# Patient Record
Sex: FEMALE | Employment: PART TIME | URBAN - METROPOLITAN AREA
[De-identification: names, ages, dates, MRNs, and addresses within clinical notes are randomized per-mention and may not be internally consistent; named-entity substitution may affect disease eponyms.]

---

## 2017-03-01 ENCOUNTER — APPOINTMENT (OUTPATIENT)
Dept: PHYSICAL THERAPY | Age: 57
End: 2017-03-01

## 2017-03-07 ENCOUNTER — HOSPITAL ENCOUNTER (OUTPATIENT)
Dept: PHYSICAL THERAPY | Age: 57
Discharge: HOME | End: 2017-03-07
Payer: COMMERCIAL

## 2017-03-07 PROCEDURE — 97162 PT EVAL MOD COMPLEX 30 MIN: CPT

## 2017-03-07 NOTE — PROGRESS NOTES
Shad Stevephill  : 1960 [facility] at [facility] PRASHANT RAO  [address] [facility], [address], [place].  [phone]   [phone]       OUTPATIENT PHYSICAL THERAPY:Initial Assessment 3/8/2017    ICD-10: Treatment Diagnosis: Difficulty in walking, not elsewhere classified (R26.2); Low back pain (M54.5)  Precautions/Allergies:   Pcn [penicillins] and Aspirin   Fall Risk Score: 2 (? 5 = High Risk)  MD Orders: water therapy with [facility]; eval and treat for 8 weeks MEDICAL/REFERRING DIAGNOSIS:  Back Pain   DATE OF ONSET: chronic  REFERRING PHYSICIAN: Luis Carlos [address]: unknown     INITIAL ASSESSMENT:  Ms Frances Romano presents to PT with complaints of pain, decreased strength, and decreased posture. She has complaints of pain from the back of her head throughout her entire spine and all 4 extremities. She has difficulty with mobility and performing her ADL's. She has done aquatics in the past and it has been helpful. She will benefit from skilled physical therapy to help decrease pain and improve mobility. PROBLEM LIST (Impacting functional limitations):  1. Decreased Strength  2. Decreased ADL/Functional Activities   3. Decreased endurance  4. Increased pain INTERVENTIONS PLANNED:  1. aquatic therapy   2. Therapeutic exercise for ROM, strengthening, flexibility  3. Manual therapy  4. Modalities as needed for pain   TREATMENT PLAN:  Effective Dates: 3-7-17 TO 17. Frequency/Duration: 2 times a week for 12 weeks  GOALS: (Goals have been discussed and agreed upon with patient.)  Short-Term Functional Goals: Time Frame: 6 weeks  1. Pt will report compliance with HEP. 2. Pt will be able to demonstrate neutral spine with sitting and standing positions. 3. Pt will perform 5 min walking endurance test.   Discharge Goals: Time Frame: 12 weeks  1. Pt will improve 5 min walking endurance test by 100 ft for improved mobility.   2. Pt will negotiate 1 flight of stairs with step over step to get to her bedroom at home. 3. Pt will demonstrate neutral spine in standing for 5 minutes to perform an ADL at home. Rehabilitation Potential For Stated Goals: Fair  Regarding Patricia Limon's therapy, I certify that the treatment plan above will be carried out by a therapist or under their direction. Thank you for this referral,    Ailyn Ball, PT       Referring Physician Signature: Juan Kidd*              Date                      HISTORY:   History of Present Injury/Illness (Reason for Referral):  Pt was injured in 2007 and continues to have symptoms from accident. Pain 8/10. Pain is constant, pain does go down her legs and arms. Numbness in her right hand. Pt reports her pain \"draws her forward\" and she doesn't like that. She would like to have exercises for improved posture. Past Medical History/Comorbidities:   neck surgery 2000 , 2007  Social History/Living Environment:    Pt lives alone. 2 story house with the bedroom on the 2nd floor. 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 Current Medications:       Current Outpatient Prescriptions:     LORazepam (ATIVAN) 1 mg tablet, Take 1 Tab by mouth nightly as needed for Anxiety. Max Daily Amount: 1 mg., Disp: 15 Tab, Rfl: 0    ZOLPIDEM TARTRATE (AMBIEN PO), Take 10 mg by mouth., Disp: , Rfl:     OXYCODONE HCL/ACETAMINOPHEN (PERCOCET PO), Take 10 mg by mouth., Disp: , Rfl:     citalopram (CELEXA) 20 mg tablet, Take 20 mg by mouth daily. , Disp: , Rfl:     pantoprazole (PROTONIX) 40 mg tablet, Take 40 mg by mouth daily. , Disp: , Rfl:     celecoxib (CELEBREX) 200 mg capsule, Take 200 mg by mouth two (2) times a day.  , Disp: , Rfl:     gabapentin (NEURONTIN) 300 mg capsule, Take 300 mg by mouth three (3) times daily.   , Disp: , Rfl:    Date Last Reviewed:  3-8-17   Number of Personal Factors/Comorbidities that affect the Plan of Care: 1-2: MODERATE COMPLEXITY   EXAMINATION:   Observation/Orthostatic Postural Assessment:          Pt sits with forward trunk and forward head posture. Pt stands with hip in anterior rotation, forward trunk position, forward head, and rounded shoulders. Sitting she leans to the right. Stairs: pt able to go up stairs one step at a time, very slowly. Pt refused to perform 5 min walking endurance test today. Body Structures Involved:  1. Joints  2. Muscles Body Functions Affected:  1. Neuromusculoskeletal Activities and Participation Affected:  1. Mobility  2. Self Care  3. Community, Social and [place]   Number of elements (examined above) that affect the Plan of Care: 3: MODERATE COMPLEXITY   CLINICAL PRESENTATION:   Presentation: Evolving clinical presentation with changing clinical characteristics: MODERATE COMPLEXITY   CLINICAL DECISION MAKING:   Outcome Measure: Tool Used: Modified Oswestry Low Back Pain Questionnaire  Score:  Initial: 35/50  Most Recent: X/50 (Date: -- )   Interpretation of Score: Each section is scored on a 0-5 scale, 5 representing the greatest disability. The scores of each section are added together for a total score of 50. Medical Necessity:   · Patient is expected to demonstrate progress in strength and endurance to improve mobility. Reason for Services/Other Comments:  · Patient continues to require skilled intervention due to decreased mobility and increased pain. Use of outcome tool(s) and clinical judgement create a POC that gives a: Questionable prediction of patient's progress: MODERATE COMPLEXITY            TREATMENT:   (In addition to Assessment/Re-Assessment sessions the following treatments were rendered)  Pre-treatment Symptoms/Complaints:  Pt reports constant pain 8/10  Pain: Initial:     8/10 Post Session:  8/10     Assessment only today, no treatment provided.     Treatment/Session Assessment: · Response to Treatment:  No treatment today. · Compliance with Program/Exercises: Will assess as treatment progresses.   · Recommendations/Intent for next treatment session: aquatic therapy for posture, mobility, strengthening  Total Treatment Duration: 30 minutes  PT Patient Time In/Time Out  Time In: 1500  Time Out: [address]

## 2017-03-08 NOTE — PROGRESS NOTES
Ambulatory/Rehab Services H2 Model Falls Risk Assessment    Risk Factor Pts. ·   Confusion/Disorientation/Impulsivity  []    4 ·   Symptomatic Depression  []   2 ·   Altered Elimination  []   1 ·   Dizziness/Vertigo  []   1 ·   Gender (Male)  []   1 ·   Any administered antiepileptics (anticonvulsants):  []   2 ·   Any administered benzodiazepines:  [x]   1 ·   Visual Impairment (specify):  []   1 ·   Portable Oxygen Use  []   1 ·   Orthostatic ? BP  []   1 ·   History of Recent Falls (within 3 mos.)  []   5     Ability to Rise from Chair (choose one) Pts. ·   Ability to rise in a single movement  []   0 ·   Pushes up, successful in one attempt  [x]   1 ·   Multiple attempts, but successful  []   3 ·   Unable to rise without assistance  []   4   Total: (5 or greater = High Risk) 2     Falls Prevention Plan:   []                Physical Limitations to Exercise (specify):   []                Mobility Assistance Device (type):   []                Exercise/Equipment Adaptation (specify):    ©2010 [facility] [place] Patent #[id number].  Federal Law prohibits the replication, distribution or use without written permission from [facility] [employer]

## 2017-03-15 ENCOUNTER — HOSPITAL ENCOUNTER (OUTPATIENT)
Dept: PHYSICAL THERAPY | Age: 57
Discharge: HOME | End: 2017-03-15
Payer: COMMERCIAL

## 2017-03-15 PROCEDURE — 97113 AQUATIC THERAPY/EXERCISES: CPT

## 2017-03-15 NOTE — PROGRESS NOTES
Macey Sessions  : 1960 Therapy Center at [facility] PRASHANT RAO  [address] [facility], [address] [place] [address],[address], [facility] [address]  [phone]   [phone]       OUTPATIENT PHYSICAL THERAPY:Daily Note 3/15/2017    ICD-10: Treatment Diagnosis: Difficulty in walking, not elsewhere classified (R26.2); Low back pain (M54.5)  Precautions/Allergies:   Pcn [penicillins] and Aspirin   Fall Risk Score: 2 (? 5 = High Risk)  MD Orders: water therapy with [facility]; eval and treat for 8 weeks MEDICAL/REFERRING DIAGNOSIS:  Back Pain   DATE OF ONSET: chronic  REFERRING PHYSICIAN: Luis Carlos [facility]: unknown     INITIAL ASSESSMENT:  Ms Rosalinda Og presents to PT with complaints of pain, decreased strength, and decreased posture. She has complaints of pain from the back of her head throughout her entire spine and all 4 extremities. She has difficulty with mobility and performing her ADL's. She has done aquatics in the past and it has been helpful. She will benefit from skilled physical therapy to help decrease pain and improve mobility. PROBLEM LIST (Impacting functional limitations):  1. Decreased Strength  2. Decreased ADL/Functional Activities   3. Decreased endurance  4. Increased pain INTERVENTIONS PLANNED:  1. aquatic therapy   2. Therapeutic exercise for ROM, strengthening, flexibility  3. Manual therapy  4. Modalities as needed for pain   TREATMENT PLAN:  Effective Dates: 3-7-17 TO 17. Frequency/Duration: 2 times a week for 12 weeks  GOALS: (Goals have been discussed and agreed upon with patient.)  Short-Term Functional Goals: Time Frame: 6 weeks  1. Pt will report compliance with HEP. 2. Pt will be able to demonstrate neutral spine with sitting and standing positions. 3. Pt will perform 5 min walking endurance test.   Discharge Goals: Time Frame: 12 weeks  1. Pt will improve 5 min walking endurance test by 100 ft for improved mobility.   2. Pt will negotiate 1 flight of stairs with step over step to get to her bedroom at home. 3. Pt will demonstrate neutral spine in standing for 5 minutes to perform an ADL at home. Rehabilitation Potential For Stated Goals: Fair  Regarding Patricia Limon's therapy, I certify that the treatment plan above will be carried out by a therapist or under their direction. Thank you for this referral,    Luis Branch, PT       Referring Physician Signature: Renu Hartley*              Date                      HISTORY:   History of Present Injury/Illness (Reason for Referral):  Pt was injured in 2007 and continues to have symptoms from accident. Pain 8/10. Pain is constant, pain does go down her legs and arms. Numbness in her right hand. Pt reports her pain \"draws her forward\" and she doesn't like that. She would like to have exercises for improved posture. Past Medical History/Comorbidities:   neck surgery 2000 , 2007  Social History/Living Environment:    Pt lives alone. 2 story house with the bedroom on the 2nd floor. 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 Current Medications:       Current Outpatient Prescriptions:     LORazepam (ATIVAN) 1 mg tablet, Take 1 Tab by mouth nightly as needed for Anxiety. Max Daily Amount: 1 mg., Disp: 15 Tab, Rfl: 0    ZOLPIDEM TARTRATE (AMBIEN PO), Take 10 mg by mouth., Disp: , Rfl:     OXYCODONE HCL/ACETAMINOPHEN (PERCOCET PO), Take 10 mg by mouth., Disp: , Rfl:     citalopram (CELEXA) 20 mg tablet, Take 20 mg by mouth daily. , Disp: , Rfl:     pantoprazole (PROTONIX) 40 mg tablet, Take 40 mg by mouth daily. , Disp: , Rfl:     celecoxib (CELEBREX) 200 mg capsule, Take 200 mg by mouth two (2) times a day.  , Disp: , Rfl:     gabapentin (NEURONTIN) 300 mg capsule, Take 300 mg by mouth three (3) times daily.   , Disp: , Rfl:    Date Last Reviewed:  3-8-17   Number of Personal Factors/Comorbidities that affect the Plan of Care: 1-2: MODERATE COMPLEXITY   EXAMINATION:   Observation/Orthostatic Postural Assessment:          Pt sits with forward trunk and forward head posture. Pt stands with hip in anterior rotation, forward trunk position, forward head, and rounded shoulders. Sitting she leans to the right. Stairs: pt able to go up stairs one step at a time, very slowly. Pt refused to perform 5 min walking endurance test today. Body Structures Involved:  1. Joints  2. Muscles Body Functions Affected:  1. Neuromusculoskeletal Activities and Participation Affected:  1. Mobility  2. Self Care  3. Community, Social and [place]   Number of elements (examined above) that affect the Plan of Care: 3: MODERATE COMPLEXITY   CLINICAL PRESENTATION:   Presentation: Evolving clinical presentation with changing clinical characteristics: MODERATE COMPLEXITY   CLINICAL DECISION MAKING:   Outcome Measure: Tool Used: Modified Oswestry Low Back Pain Questionnaire  Score:  Initial: 35/50  Most Recent: X/50 (Date: -- )   Interpretation of Score: Each section is scored on a 0-5 scale, 5 representing the greatest disability. The scores of each section are added together for a total score of 50. Medical Necessity:   · Patient is expected to demonstrate progress in strength and endurance to improve mobility. Reason for Services/Other Comments:  · Patient continues to require skilled intervention due to decreased mobility and increased pain. Use of outcome tool(s) and clinical judgement create a POC that gives a: Questionable prediction of patient's progress: MODERATE COMPLEXITY            TREATMENT:   (In addition to Assessment/Re-Assessment sessions the following treatments were rendered)  Pre-treatment Symptoms/Complaints:  Pt reports constant pain 8/10  Pain: Initial:     8/10 Post Session:  8/10     Aquatic Therapy (45 minutes):  Aquatic treatment performed per flow grid for Decreased muscle strength, Decreased endurance, Decreased range of motion, Decreased activity endurance, Decompression, Ease of movement and Low impact and reduced weight bearing activity. Cues provided for posture, gait and ex. Aquatic Exercise Log       Date  3/15 Date   Date   Date   Date     Activity/ Exercise Parameters Parameters Parameters Parameters Parameters   Walking forward 6       Walking backward 6       Walking sideways 6         Marching 6         Goose Step 6         Tip toes 3         Heels 3         Lunges        Side step squats        LE Exercises          Hip Flex/Ext 10         Hip Abd/Add 10         Hip IR/ER          Calf raises 10         Knee Flex 10         Squats          Leg Circles 10/10         Step Ups 10       UE Exercises          butterflies 10         Shoulder rolls 10x         Pull Down          Bicep/Tricep        Rows/Press outs         Chi Positions          Trunk Rotation        Deep H2O/ Noodles 7' with noodle and assist         Stabilization          Arms only          Legs only        Cross   Country 1 min         Scissors 1 min         Jog   Jog 3 min       Lower abdominal   work           Cardio          Jogging        Lap   Swimming          Stretches          Hamstrings          Heelcords          Piriformis          Neck              Treatment/Session Assessment:    · Response to Treatment:  Pt did well with aquatic therapy today. After a few sessions would like to have pt perform the 5 minute walk test on land. Will begin working toward doing it in the water. · Compliance with Program/Exercises: Will assess as treatment progresses.   · Recommendations/Intent for next treatment session: aquatic therapy for posture, mobility, strengthening  Total Treatment Duration: 45 minutes  PT Patient Time In/Time Out  Time In: 0230  Time Out: 0315    Aj Subramanian PT

## 2017-03-17 ENCOUNTER — HOSPITAL ENCOUNTER (OUTPATIENT)
Dept: PHYSICAL THERAPY | Age: 57
Discharge: HOME | End: 2017-03-17
Payer: COMMERCIAL

## 2017-03-22 ENCOUNTER — HOSPITAL ENCOUNTER (OUTPATIENT)
Dept: PHYSICAL THERAPY | Age: 57
Discharge: HOME | End: 2017-03-22
Payer: COMMERCIAL

## 2017-03-22 PROCEDURE — 97113 AQUATIC THERAPY/EXERCISES: CPT

## 2017-03-22 NOTE — PROGRESS NOTES
Ebony Maffucci  : 1960 Therapy Center at [facility] PRASHANT RAO  [address] [facility], [address] [place] [address] 83[address], [address] [facility]  [phone]   [phone]       OUTPATIENT PHYSICAL THERAPY:Daily Note 3/22/2017    ICD-10: Treatment Diagnosis: Difficulty in walking, not elsewhere classified (R26.2); Low back pain (M54.5)  Precautions/Allergies:   Pcn [penicillins] and Aspirin   Fall Risk Score: 2 (? 5 = High Risk)  MD Orders: water therapy with [facility]; eval and treat for 8 weeks MEDICAL/REFERRING DIAGNOSIS:  Back Pain   DATE OF ONSET: chronic  REFERRING PHYSICIAN: Luis Carlos [facility]: unknown     INITIAL ASSESSMENT:  Ms Yoly Payton presents to PT with complaints of pain, decreased strength, and decreased posture. She has complaints of pain from the back of her head throughout her entire spine and all 4 extremities. She has difficulty with mobility and performing her ADL's. She has done aquatics in the past and it has been helpful. She will benefit from skilled physical therapy to help decrease pain and improve mobility. PROBLEM LIST (Impacting functional limitations):  1. Decreased Strength  2. Decreased ADL/Functional Activities   3. Decreased endurance  4. Increased pain INTERVENTIONS PLANNED:  1. aquatic therapy   2. Therapeutic exercise for ROM, strengthening, flexibility  3. Manual therapy  4. Modalities as needed for pain   TREATMENT PLAN:  Effective Dates: 3-7-17 TO 17. Frequency/Duration: 2 times a week for 12 weeks  GOALS: (Goals have been discussed and agreed upon with patient.)  Short-Term Functional Goals: Time Frame: 6 weeks  1. Pt will report compliance with HEP. 2. Pt will be able to demonstrate neutral spine with sitting and standing positions. 3. Pt will perform 5 min walking endurance test.   Discharge Goals: Time Frame: 12 weeks  1. Pt will improve 5 min walking endurance test by 100 ft for improved mobility.   2. Pt will negotiate 1 flight of stairs with step over step to get to her bedroom at home. 3. Pt will demonstrate neutral spine in standing for 5 minutes to perform an ADL at home. Rehabilitation Potential For Stated Goals: Fair  Regarding Patricia Limon's therapy, I certify that the treatment plan above will be carried out by a therapist or under their direction. Thank you for this referral,    Blair Sunshine, PT       Referring Physician Signature: Kailee Songlibia*              Date                      HISTORY:   History of Present Injury/Illness (Reason for Referral):  Pt was injured in 2007 and continues to have symptoms from accident. Pain 8/10. Pain is constant, pain does go down her legs and arms. Numbness in her right hand. Pt reports her pain \"draws her forward\" and she doesn't like that. She would like to have exercises for improved posture. Past Medical History/Comorbidities:   neck surgery 2000 , 2007  Social History/Living Environment:    Pt lives alone. 2 story house with the bedroom on the 2nd floor. 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 Current Medications:       Current Outpatient Prescriptions:     LORazepam (ATIVAN) 1 mg tablet, Take 1 Tab by mouth nightly as needed for Anxiety. Max Daily Amount: 1 mg., Disp: 15 Tab, Rfl: 0    ZOLPIDEM TARTRATE (AMBIEN PO), Take 10 mg by mouth., Disp: , Rfl:     OXYCODONE HCL/ACETAMINOPHEN (PERCOCET PO), Take 10 mg by mouth., Disp: , Rfl:     citalopram (CELEXA) 20 mg tablet, Take 20 mg by mouth daily. , Disp: , Rfl:     pantoprazole (PROTONIX) 40 mg tablet, Take 40 mg by mouth daily. , Disp: , Rfl:     celecoxib (CELEBREX) 200 mg capsule, Take 200 mg by mouth two (2) times a day.  , Disp: , Rfl:     gabapentin (NEURONTIN) 300 mg capsule, Take 300 mg by mouth three (3) times daily.   , Disp: , Rfl:    Date Last Reviewed:  3-22-17   Number of Personal Factors/Comorbidities that affect the Plan of Care: 1-2: MODERATE COMPLEXITY   EXAMINATION:   Observation/Orthostatic Postural Assessment:          Pt sits with forward trunk and forward head posture. Pt stands with hip in anterior rotation, forward trunk position, forward head, and rounded shoulders. Sitting she leans to the right. Stairs: pt able to go up stairs one step at a time, very slowly. Pt refused to perform 5 min walking endurance test today. Body Structures Involved:  1. Joints  2. Muscles Body Functions Affected:  1. Neuromusculoskeletal Activities and Participation Affected:  1. Mobility  2. Self Care  3. Community, Social and [place]   Number of elements (examined above) that affect the Plan of Care: 3: MODERATE COMPLEXITY   CLINICAL PRESENTATION:   Presentation: Evolving clinical presentation with changing clinical characteristics: MODERATE COMPLEXITY   CLINICAL DECISION MAKING:   Outcome Measure: Tool Used: Modified Oswestry Low Back Pain Questionnaire  Score:  Initial: 35/50  Most Recent: X/50 (Date: -- )   Interpretation of Score: Each section is scored on a 0-5 scale, 5 representing the greatest disability. The scores of each section are added together for a total score of 50. Medical Necessity:   · Patient is expected to demonstrate progress in strength and endurance to improve mobility. Reason for Services/Other Comments:  · Patient continues to require skilled intervention due to decreased mobility and increased pain. Use of outcome tool(s) and clinical judgement create a POC that gives a: Questionable prediction of patient's progress: MODERATE COMPLEXITY            TREATMENT:   (In addition to Assessment/Re-Assessment sessions the following treatments were rendered)  Pre-treatment Symptoms/Complaints:  Pt continues to report pain 8/10. She has also had some type of allergic reaction and is complaining of continued itching.   Pain: Initial:     8/10 Post Session:  8/10 Aquatic Therapy (45 minutes): Aquatic treatment performed per flow grid for Decreased muscle strength, Decreased endurance, Decreased range of motion, Decreased activity endurance, Decompression, Ease of movement and Low impact and reduced weight bearing activity. Cues provided for posture, gait and ex. Aquatic Exercise Log       Date  3/15 Date  3/22 Date   Date   Date     Activity/ Exercise Parameters Parameters Parameters Parameters Parameters   Walking forward 6 8      Walking backward 6 6      Walking sideways 6 6        Marching 6 6        Goose Step 6 6        Tip toes 3 3        Heels 3 3        Lunges        Side step squats        LE Exercises          Hip Flex/Ext 10 10        Hip Abd/Add 10 10        Hip IR/ER          Calf raises 10 10        Knee Flex 10 10        Squats          Leg Circles 10/10 10/10        Step Ups 10 10      UE Exercises          butterflies 10 10        Shoulder rolls 10x 10        Pull Down          Bicep/Tricep        Rows/Press outs         Chi Positions          Trunk Rotation        Deep H2O/ Noodles 7' with noodle and assist 7' with noodle and assist        Stabilization          Arms only          Legs only        Cross   Country 1 min 2 min        Scissors 1 min 2 min        Jog   Jog 3 min 2 min      Lower abdominal   work           Cardio          Jogging        Lap   Swimming          Stretches          Hamstrings          Heelcords          Piriformis          Neck              Treatment/Session Assessment:    · Response to Treatment: Pt did well with aquatic therapy today. After a few sessions would like to have pt perform the 5 minute walk test on land. · Compliance with Program/Exercises: Will assess as treatment progresses.   · Recommendations/Intent for next treatment session: aquatic therapy for posture, mobility, strengthening  Total Treatment Duration: 45 minutes  PT Patient Time In/Time Out  Time In: 0230  Time Out: 0315    Alicia Mensah, [place]

## 2017-03-24 ENCOUNTER — HOSPITAL ENCOUNTER (OUTPATIENT)
Dept: PHYSICAL THERAPY | Age: 57
Discharge: HOME | End: 2017-03-24
Payer: COMMERCIAL

## 2017-03-24 PROCEDURE — 97113 AQUATIC THERAPY/EXERCISES: CPT

## 2017-03-24 NOTE — PROGRESS NOTES
Anthony Foreman  : 1960 [facility] at [facility] PRASHANT RAO  1101 [facility], [address] [place] [address],[address], [facility] [address].  [phone]   [phone]       OUTPATIENT PHYSICAL THERAPY:Daily Note 3/24/2017    ICD-10: Treatment Diagnosis: Difficulty in walking, not elsewhere classified (R26.2); Low back pain (M54.5)  Precautions/Allergies:   Pcn [penicillins] and Aspirin   Fall Risk Score: 2 (? 5 = High Risk)  MD Orders: water therapy with [address]; eval and treat for 8 weeks MEDICAL/REFERRING DIAGNOSIS:  Back Pain   DATE OF ONSET: chronic  REFERRING PHYSICIAN: Luis Carlos [facility]: unknown     INITIAL ASSESSMENT:  Ms Ira Strange presents to PT with complaints of pain, decreased strength, and decreased posture. She has complaints of pain from the back of her head throughout her entire spine and all 4 extremities. She has difficulty with mobility and performing her ADL's. She has done aquatics in the past and it has been helpful. She will benefit from skilled physical therapy to help decrease pain and improve mobility. PROBLEM LIST (Impacting functional limitations):  1. Decreased Strength  2. Decreased ADL/Functional Activities   3. Decreased endurance  4. Increased pain INTERVENTIONS PLANNED:  1. aquatic therapy   2. Therapeutic exercise for ROM, strengthening, flexibility  3. Manual therapy  4. Modalities as needed for pain   TREATMENT PLAN:  Effective Dates: 3-7-17 TO 17. Frequency/Duration: 2 times a week for 12 weeks  GOALS: (Goals have been discussed and agreed upon with patient.)  Short-Term Functional Goals: Time Frame: 6 weeks  1. Pt will report compliance with HEP. 2. Pt will be able to demonstrate neutral spine with sitting and standing positions. 3. Pt will perform 5 min walking endurance test.   Discharge Goals: Time Frame: 12 weeks  1. Pt will improve 5 min walking endurance test by 100 ft for improved mobility.   2. Pt will negotiate 1 flight of stairs with step over step to get to her bedroom at home. 3. Pt will demonstrate neutral spine in standing for 5 minutes to perform an ADL at home. Rehabilitation Potential For Stated Goals: Fair  Regarding Patricia Limon's therapy, I certify that the treatment plan above will be carried out by a therapist or under their direction. Thank you for this referral,    Tanvir Mosqueda, PT       Referring Physician Signature: Chely Perez*              Date                      HISTORY:   History of Present Injury/Illness (Reason for Referral):  Pt was injured in 2007 and continues to have symptoms from accident. Pain 8/10. Pain is constant, pain does go down her legs and arms. Numbness in her right hand. Pt reports her pain \"draws her forward\" and she doesn't like that. She would like to have exercises for improved posture. Past Medical History/Comorbidities:   neck surgery 2000 , 2007  Social History/Living Environment:    Pt lives alone. 2 story house with the bedroom on the 2nd floor. 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 Current Medications:       Current Outpatient Prescriptions:     LORazepam (ATIVAN) 1 mg tablet, Take 1 Tab by mouth nightly as needed for Anxiety. Max Daily Amount: 1 mg., Disp: 15 Tab, Rfl: 0    ZOLPIDEM TARTRATE (AMBIEN PO), Take 10 mg by mouth., Disp: , Rfl:     OXYCODONE HCL/ACETAMINOPHEN (PERCOCET PO), Take 10 mg by mouth., Disp: , Rfl:     citalopram (CELEXA) 20 mg tablet, Take 20 mg by mouth daily. , Disp: , Rfl:     pantoprazole (PROTONIX) 40 mg tablet, Take 40 mg by mouth daily. , Disp: , Rfl:     celecoxib (CELEBREX) 200 mg capsule, Take 200 mg by mouth two (2) times a day.  , Disp: , Rfl:     gabapentin (NEURONTIN) 300 mg capsule, Take 300 mg by mouth three (3) times daily.   , Disp: , Rfl:    Date Last Reviewed:  3-8-17   Number of Personal Factors/Comorbidities that affect the Plan of Care: 1-2: MODERATE COMPLEXITY   EXAMINATION:   Observation/Orthostatic Postural Assessment:          Pt sits with forward trunk and forward head posture. Pt stands with hip in anterior rotation, forward trunk position, forward head, and rounded shoulders. Sitting she leans to the right. Stairs: pt able to go up stairs one step at a time, very slowly. Pt refused to perform 5 min walking endurance test today. Body Structures Involved:  1. Joints  2. Muscles Body Functions Affected:  1. Neuromusculoskeletal Activities and Participation Affected:  1. Mobility  2. Self Care  3. Community, Social and [place]   Number of elements (examined above) that affect the Plan of Care: 3: MODERATE COMPLEXITY   CLINICAL PRESENTATION:   Presentation: Evolving clinical presentation with changing clinical characteristics: MODERATE COMPLEXITY   CLINICAL DECISION MAKING:   Outcome Measure: Tool Used: Modified Oswestry Low Back Pain Questionnaire  Score:  Initial: 35/50  Most Recent: X/50 (Date: -- )   Interpretation of Score: Each section is scored on a 0-5 scale, 5 representing the greatest disability. The scores of each section are added together for a total score of 50. Medical Necessity:   · Patient is expected to demonstrate progress in strength and endurance to improve mobility. Reason for Services/Other Comments:  · Patient continues to require skilled intervention due to decreased mobility and increased pain. Use of outcome tool(s) and clinical judgement create a POC that gives a: Questionable prediction of patient's progress: MODERATE COMPLEXITY            TREATMENT:   (In addition to Assessment/Re-Assessment sessions the following treatments were rendered)  Pre-treatment Symptoms/Complaints:  Pt states her pain is constant at 8/10. Pain: Initial:     8/10 Post Session:  8/10     Aquatic Therapy (45 minutes):  Aquatic treatment performed per flow grid for Decreased muscle strength, Decreased endurance, Decreased range of motion, Decreased activity endurance, Decompression, Ease of movement and Low impact and reduced weight bearing activity. Cues provided for posture, gait and ex. Aquatic Exercise Log       Date  3/15 Date  3/24 Date   Date   Date     Activity/ Exercise Parameters Parameters Parameters Parameters Parameters   Walking forward 6 6      Walking backward 6 6      Walking sideways 6 6        Marching 6 6        Goose Step 6 6        Tip toes 3 3        Heels 3 3        Lunges        Side step squats        LE Exercises          Hip Flex/Ext 10 12        Hip Abd/Add 10 12        Hip IR/ER          Calf raises 10 12        Knee Flex 10 12        Squats          Leg Circles 10/10 12/12        Step Ups 10 12      UE Exercises          butterflies 10 12        Shoulder rolls 10x 12        Pull Down          Bicep/Tricep        Rows/Press outs         Chi Positions          Trunk Rotation        Deep H2O/ Noodles 7' with noodle and assist         Stabilization          Arms only          Legs only        Cross   Country 1 min 2 min        Scissors 1 min 1 min        Jog   Jog 3 min       Lower abdominal   work           Cardio          Jogging        Lap   Swimming          Stretches          Hamstrings          Heelcords          Piriformis          Neck              Treatment/Session Assessment:    · Response to Treatment:  Pt did well with exercises in the water. .  · Compliance with Program/Exercises: Will assess as treatment progresses.   · Recommendations/Intent for next treatment session: aquatic therapy for posture, mobility, strengthening  Total Treatment Duration: 45 minutes  PT Patient Time In/Time Out  Time In: 1230  Time Out: 0115    Enedelia Real PT

## 2017-03-29 ENCOUNTER — HOSPITAL ENCOUNTER (OUTPATIENT)
Dept: PHYSICAL THERAPY | Age: 57
End: 2017-03-29
Payer: COMMERCIAL

## 2017-05-03 ENCOUNTER — HOSPITAL ENCOUNTER (OUTPATIENT)
Dept: PHYSICAL THERAPY | Age: 57
Discharge: HOME | End: 2017-05-03
Payer: COMMERCIAL

## 2017-05-03 PROCEDURE — 97113 AQUATIC THERAPY/EXERCISES: CPT

## 2017-05-05 NOTE — PROGRESS NOTES
Rima Perez  : 1960 [facility] PRASHANT RAO  1101 [facility], [address] [place] [address],[address], [facility] [address]  [phone]   [phone]       OUTPATIENT PHYSICAL THERAPY:Daily Note 2017    ICD-10: Treatment Diagnosis: Difficulty in walking, not elsewhere classified (R26.2); Low back pain (M54.5)  Precautions/Allergies:   Pcn [penicillins] and Aspirin   Fall Risk Score: 2 (? 5 = High Risk)  MD Orders: water therapy with [facility]; eval and treat for 8 weeks MEDICAL/REFERRING DIAGNOSIS:  Back Pain   DATE OF ONSET: chronic  REFERRING PHYSICIAN: Luis Carlos [facility] [address]: unknown     INITIAL ASSESSMENT:  Ms Derrick Councilman presents to PT with complaints of pain, decreased strength, and decreased posture. She has complaints of pain from the back of her head throughout her entire spine and all 4 extremities. She has difficulty with mobility and performing her ADL's. She has done aquatics in the past and it has been helpful. She will benefit from skilled physical therapy to help decrease pain and improve mobility. PROBLEM LIST (Impacting functional limitations):  1. Decreased Strength  2. Decreased ADL/Functional Activities   3. Decreased endurance  4. Increased pain INTERVENTIONS PLANNED:  1. aquatic therapy   2. Therapeutic exercise for ROM, strengthening, flexibility  3. Manual therapy  4. Modalities as needed for pain   TREATMENT PLAN:  Effective Dates: 3-7-17 TO 17. Frequency/Duration: 2 times a week for 12 weeks  GOALS: (Goals have been discussed and agreed upon with patient.)  Short-Term Functional Goals: Time Frame: 6 weeks  1. Pt will report compliance with HEP. 2. Pt will be able to demonstrate neutral spine with sitting and standing positions. 3. Pt will perform 5 min walking endurance test.   Discharge Goals: Time Frame: 12 weeks  1. Pt will improve 5 min walking endurance test by 100 ft for improved mobility.   2. Pt will negotiate 1 flight of stairs with step over step to get to her bedroom at home. 3. Pt will demonstrate neutral spine in standing for 5 minutes to perform an ADL at home. Rehabilitation Potential For Stated Goals: Fair  Regarding Patricia Limon's therapy, I certify that the treatment plan above will be carried out by a therapist or under their direction. Thank you for this referral,    Louise Myers, PT       Referring Physician Signature: Jarocho Fraire*              Date                      HISTORY:   History of Present Injury/Illness (Reason for Referral):  Pt was injured in 2007 and continues to have symptoms from accident. Pain 8/10. Pain is constant, pain does go down her legs and arms. Numbness in her right hand. Pt reports her pain \"draws her forward\" and she doesn't like that. She would like to have exercises for improved posture. Past Medical History/Comorbidities:   neck surgery 2000 , 2007  Social History/Living Environment:    Pt lives alone. 2 story house with the bedroom on the 2nd floor. 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 Current Medications:       Current Outpatient Prescriptions:     LORazepam (ATIVAN) 1 mg tablet, Take 1 Tab by mouth nightly as needed for Anxiety. Max Daily Amount: 1 mg., Disp: 15 Tab, Rfl: 0    ZOLPIDEM TARTRATE (AMBIEN PO), Take 10 mg by mouth., Disp: , Rfl:     OXYCODONE HCL/ACETAMINOPHEN (PERCOCET PO), Take 10 mg by mouth., Disp: , Rfl:     citalopram (CELEXA) 20 mg tablet, Take 20 mg by mouth daily. , Disp: , Rfl:     pantoprazole (PROTONIX) 40 mg tablet, Take 40 mg by mouth daily. , Disp: , Rfl:     celecoxib (CELEBREX) 200 mg capsule, Take 200 mg by mouth two (2) times a day.  , Disp: , Rfl:     gabapentin (NEURONTIN) 300 mg capsule, Take 300 mg by mouth three (3) times daily.   , Disp: , Rfl:    Date Last Reviewed:  3-8-17   Number of Personal Factors/Comorbidities that affect the Plan of Care: 1-2: MODERATE COMPLEXITY   EXAMINATION:   Observation/Orthostatic Postural Assessment:          Pt sits with forward trunk and forward head posture. Pt stands with hip in anterior rotation, forward trunk position, forward head, and rounded shoulders. Sitting she leans to the right. Stairs: pt able to go up stairs one step at a time, very slowly. Pt refused to perform 5 min walking endurance test today. Body Structures Involved:  1. Joints  2. Muscles Body Functions Affected:  1. Neuromusculoskeletal Activities and Participation Affected:  1. Mobility  2. Self Care  3. Community, Social and [place]   Number of elements (examined above) that affect the Plan of Care: 3: MODERATE COMPLEXITY   CLINICAL PRESENTATION:   Presentation: Evolving clinical presentation with changing clinical characteristics: MODERATE COMPLEXITY   CLINICAL DECISION MAKING:   Outcome Measure: Tool Used: Modified Oswestry Low Back Pain Questionnaire  Score:  Initial: 35/50  Most Recent: X/50 (Date: -- )   Interpretation of Score: Each section is scored on a 0-5 scale, 5 representing the greatest disability. The scores of each section are added together for a total score of 50. Medical Necessity:   · Patient is expected to demonstrate progress in strength and endurance to improve mobility. Reason for Services/Other Comments:  · Patient continues to require skilled intervention due to decreased mobility and increased pain. Use of outcome tool(s) and clinical judgement create a POC that gives a: Questionable prediction of patient's progress: MODERATE COMPLEXITY            TREATMENT:   (In addition to Assessment/Re-Assessment sessions the following treatments were rendered)  Pre-treatment Symptoms/Complaints:  Pt states her pain is constant at 8/10. Pain: Initial:     8/10 Post Session:  8/10     Aquatic Therapy (45 minutes):  Aquatic treatment performed per flow grid for Decreased muscle strength, Decreased endurance, Decreased range of motion, Decreased activity endurance, Decompression, Ease of movement and Low impact and reduced weight bearing activity. Cues provided for posture, gait and ex. Aquatic Exercise Log       Date  3/15 Date  3/24 Date  5/3 Date   Date     Activity/ Exercise Parameters Parameters Parameters Parameters Parameters   Walking forward 6 6 6     Walking backward 6 6 6     Walking sideways 6 6 6       Marching 6 6 6       Goose Step 6 6 6       Tip toes 3 3 3       Heels 3 3 3       Lunges        Side step squats        LE Exercises          Hip Flex/Ext 10 12 12       Hip Abd/Add 10 12 12       Hip IR/ER          Calf raises 10 12 12       Knee Flex 10 12 12       Squats          Leg Circles 10/10 12/12 12/12       Step Ups 10 12 12     UE Exercises          butterflies 10 12 12       Shoulder rolls 10x 12 12       Pull Down          Bicep/Tricep        Rows/Press outs         Chi Positions          Trunk Rotation        Deep H2O/ Noodles 7' with noodle and assist  7' with noodle and assist       Stabilization          Arms only          Legs only        Cross   Country 1 min 2 min 2 min       Scissors 1 min 1 min 1 min       Jog   Jog 3 min  Jog 3 min     Lower abdominal   work           Cardio          Jogging        Lap   Swimming          Stretches          Hamstrings          Heelcords          Piriformis          Neck              Treatment/Session Assessment:    · Response to Treatment:  Pt did well with exercises in the water. · Compliance with Program/Exercises: Will assess as treatment progresses.   · Recommendations/Intent for next treatment session: aquatic therapy for posture, mobility, strengthening  Total Treatment Duration: 45 minutes       Reece Hardwick PT

## 2017-06-13 ENCOUNTER — HOSPITAL ENCOUNTER (OUTPATIENT)
Dept: PHYSICAL THERAPY | Age: 57
Discharge: HOME | End: 2017-06-13
Payer: COMMERCIAL

## 2017-06-13 PROCEDURE — 97113 AQUATIC THERAPY/EXERCISES: CPT

## 2017-06-20 ENCOUNTER — HOSPITAL ENCOUNTER (OUTPATIENT)
Dept: PHYSICAL THERAPY | Age: 57
Discharge: HOME | End: 2017-06-20
Payer: COMMERCIAL

## 2017-06-27 ENCOUNTER — HOSPITAL ENCOUNTER (OUTPATIENT)
Dept: PHYSICAL THERAPY | Age: 57
Discharge: HOME | End: 2017-06-27
Payer: COMMERCIAL

## 2017-06-27 PROCEDURE — 97113 AQUATIC THERAPY/EXERCISES: CPT

## 2017-06-27 NOTE — PROGRESS NOTES
Marcy Bamberger  : 1960 Therapy Center at [facility] PRASHANT RAO  1101 [facility], [address] [place] [address],[address], [facility] [address]  [phone]   [phone]       OUTPATIENT PHYSICAL THERAPY:Daily Note 2017    ICD-10: Treatment Diagnosis: Difficulty in walking, not elsewhere classified (R26.2); Low back pain (M54.5)  Precautions/Allergies:   Pcn [penicillins] and Aspirin   Fall Risk Score: 2 (? 5 = High Risk)  MD Orders: water therapy with [facility]; eval and treat for 8 weeks MEDICAL/REFERRING DIAGNOSIS:  Back Pain   DATE OF ONSET: chronic  REFERRING PHYSICIAN: Luis Carlos [facility]: unknown     INITIAL ASSESSMENT:  Ms John Dempsey presents to PT with complaints of pain, decreased strength, and decreased posture. She has complaints of pain from the back of her head throughout her entire spine and all 4 extremities. She has difficulty with mobility and performing her ADL's. She has done aquatics in the past and it has been helpful. She will benefit from skilled physical therapy to help decrease pain and improve mobility. PROBLEM LIST (Impacting functional limitations):  1. Decreased Strength  2. Decreased ADL/Functional Activities   3. Decreased endurance  4. Increased pain INTERVENTIONS PLANNED:  1. aquatic therapy   2. Therapeutic exercise for ROM, strengthening, flexibility  3. Manual therapy  4. Modalities as needed for pain   TREATMENT PLAN:  Effective Dates: 3-7-17 TO 17. Frequency/Duration: 2 times a week for 12 weeks  GOALS: (Goals have been discussed and agreed upon with patient.)  Short-Term Functional Goals: Time Frame: 6 weeks  1. Pt will report compliance with HEP. 2. Pt will be able to demonstrate neutral spine with sitting and standing positions. 3. Pt will perform 5 min walking endurance test.   Discharge Goals: Time Frame: 12 weeks  1. Pt will improve 5 min walking endurance test by 100 ft for improved mobility.   2. Pt will negotiate 1 flight of stairs with step over step to get to her bedroom at home. 3. Pt will demonstrate neutral spine in standing for 5 minutes to perform an ADL at home. Rehabilitation Potential For Stated Goals: Fair  Regarding Patricia Limon's therapy, I certify that the treatment plan above will be carried out by a therapist or under their direction. Thank you for this referral,    Ivon Barros PTA       Referring Physician Signature: Haven Yeager*              Date                      HISTORY:   History of Present Injury/Illness (Reason for Referral):  Pt was injured in 2007 and continues to have symptoms from accident. Pain 8/10. Pain is constant, pain does go down her legs and arms. Numbness in her right hand. Pt reports her pain \"draws her forward\" and she doesn't like that. She would like to have exercises for improved posture. Past Medical History/Comorbidities:   neck surgery 2000 , 2007  Social History/Living Environment:    Pt lives alone. 2 story house with the bedroom on the 2nd floor. 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 Current Medications:       Current Outpatient Prescriptions:     LORazepam (ATIVAN) 1 mg tablet, Take 1 Tab by mouth nightly as needed for Anxiety. Max Daily Amount: 1 mg., Disp: 15 Tab, Rfl: 0    ZOLPIDEM TARTRATE (AMBIEN PO), Take 10 mg by mouth., Disp: , Rfl:     OXYCODONE HCL/ACETAMINOPHEN (PERCOCET PO), Take 10 mg by mouth., Disp: , Rfl:     citalopram (CELEXA) 20 mg tablet, Take 20 mg by mouth daily. , Disp: , Rfl:     pantoprazole (PROTONIX) 40 mg tablet, Take 40 mg by mouth daily. , Disp: , Rfl:     celecoxib (CELEBREX) 200 mg capsule, Take 200 mg by mouth two (2) times a day.  , Disp: , Rfl:     gabapentin (NEURONTIN) 300 mg capsule, Take 300 mg by mouth three (3) times daily.   , Disp: , Rfl:    Date Last Reviewed:  3-8-17   Number of Personal Factors/Comorbidities that affect the Plan of Care: 1-2: MODERATE COMPLEXITY   EXAMINATION:   Observation/Orthostatic Postural Assessment:          Pt sits with forward trunk and forward head posture. Pt stands with hip in anterior rotation, forward trunk position, forward head, and rounded shoulders. Sitting she leans to the right. Stairs: pt able to go up stairs one step at a time, very slowly. Pt refused to perform 5 min walking endurance test today. Body Structures Involved:  1. Joints  2. Muscles Body Functions Affected:  1. Neuromusculoskeletal Activities and Participation Affected:  1. Mobility  2. Self Care  3. Community, Social and [place]   Number of elements (examined above) that affect the Plan of Care: 3: MODERATE COMPLEXITY   CLINICAL PRESENTATION:   Presentation: Evolving clinical presentation with changing clinical characteristics: MODERATE COMPLEXITY   CLINICAL DECISION MAKING:   Outcome Measure: Tool Used: Modified Oswestry Low Back Pain Questionnaire  Score:  Initial: 35/50  Most Recent: X/50 (Date: -- )   Interpretation of Score: Each section is scored on a 0-5 scale, 5 representing the greatest disability. The scores of each section are added together for a total score of 50. Medical Necessity:   · Patient is expected to demonstrate progress in strength and endurance to improve mobility. Reason for Services/Other Comments:  · Patient continues to require skilled intervention due to decreased mobility and increased pain. Use of outcome tool(s) and clinical judgement create a POC that gives a: Questionable prediction of patient's progress: MODERATE COMPLEXITY            TREATMENT:   (In addition to Assessment/Re-Assessment sessions the following treatments were rendered)  Pre-treatment Symptoms/Complaints:  Pt states her pain is constant at 8/10. Pain: Initial:     5/10 visually Post Session:  5/10     Aquatic Therapy (45 minutes):  Aquatic treatment performed per flow grid for Decreased muscle strength, Decreased endurance, Decreased range of motion, Decreased activity endurance, Decompression, Ease of movement and Low impact and reduced weight bearing activity. Cues provided for posture, gait and ex. Aquatic Exercise Log       Date  3/15 Date  3/24 Date  5/3 Date  6/27/17 Date     Activity/ Exercise Parameters Parameters Parameters Parameters Parameters   Walking forward 6 6 6 6    Walking backward 6 6 6 6    Walking sideways 6 6 6 6      Marching 6 6 6 6      Goose Step 6 6 6 6      Tip toes 3 3 3 3      Heels 3 3 3 3      Lunges    Long step 6    Side step squats        LE Exercises    3.5'      Hip Flex/Ext 10 12 12 12      Hip Abd/Add 10 12 12 12      Hip IR/ER          Calf raises 10 12 12 12      Knee Flex 10 12 12 12      Squats          Leg Circles 10/10 12/12 12/12 12/12      Step Ups 10 12 12 12    UE Exercises          butterflies 10 12 12       Shoulder rolls 10x 12 12       Pull Down          Bicep/Tricep        Rows/Press outs         Chi Positions          Trunk Rotation        Deep H2O/ Noodles 7' with noodle and assist  7' with noodle and assist Declined      Stabilization          Arms only          Legs only        Cross   Country 1 min 2 min 2 min       Scissors 1 min 1 min 1 min       Jog   Jog 3 min  Jog 3 min     Lower abdominal   work           Cardio          Jogging        Lap   Swimming          Stretches          Hamstrings          Heelcords          Piriformis          Neck          Pt sat in front of jet x 15 minutes after PT for pressure relief on back. Treatment/Session Assessment:    · Response to Treatment:  Pt did well with exercises in the water. · Compliance with Program/Exercises: Will assess as treatment progresses.   · Recommendations/Intent for next treatment session: aquatic therapy for posture, mobility, strengthening  Total Treatment Duration: 45 minutes  PT Patient Time In/Time Out  Time In: 1115  Time Out: 1145    Danielle ARIAS Ryan Purpura, PTA

## 2017-06-29 ENCOUNTER — HOSPITAL ENCOUNTER (OUTPATIENT)
Dept: PHYSICAL THERAPY | Age: 57
Discharge: HOME | End: 2017-06-29
Payer: COMMERCIAL

## 2017-07-11 ENCOUNTER — HOSPITAL ENCOUNTER (OUTPATIENT)
Dept: PHYSICAL THERAPY | Age: 57
Discharge: HOME | End: 2017-07-11
Payer: COMMERCIAL

## 2017-07-11 PROCEDURE — 97113 AQUATIC THERAPY/EXERCISES: CPT

## 2017-07-25 ENCOUNTER — HOSPITAL ENCOUNTER (OUTPATIENT)
Dept: PHYSICAL THERAPY | Age: 57
Discharge: HOME | End: 2017-07-25
Payer: COMMERCIAL

## 2017-07-25 PROCEDURE — 97113 AQUATIC THERAPY/EXERCISES: CPT

## 2017-07-25 NOTE — PROGRESS NOTES
Segundo Hill  : 1960 [facility] PRASHANT RAO  1101 [facility], [address] [place] [address],[address], [facility] [address].  [phone]   [phone]       OUTPATIENT PHYSICAL THERAPY:Daily Note 2017    ICD-10: Treatment Diagnosis: Difficulty in walking, not elsewhere classified (R26.2); Low back pain (M54.5)  Precautions/Allergies:   Pcn [penicillins] and Aspirin   Fall Risk Score: 2 (? 5 = High Risk)  MD Orders: water therapy with [facility] at [facility]; eval and treat for 8 weeks MEDICAL/REFERRING DIAGNOSIS:  Back Pain   DATE OF ONSET: chronic  REFERRING PHYSICIAN: Luis Carlos [facility]: unknown     INITIAL ASSESSMENT:  Ms Shae Alvarez presents to PT with complaints of pain, decreased strength, and decreased posture. She has complaints of pain from the back of her head throughout her entire spine and all 4 extremities. She has difficulty with mobility and performing her ADL's. She has done aquatics in the past and it has been helpful. She will benefit from skilled physical therapy to help decrease pain and improve mobility. PROBLEM LIST (Impacting functional limitations):  1. Decreased Strength  2. Decreased ADL/Functional Activities   3. Decreased endurance  4. Increased pain INTERVENTIONS PLANNED:  1. aquatic therapy   2. Therapeutic exercise for ROM, strengthening, flexibility  3. Manual therapy  4. Modalities as needed for pain   TREATMENT PLAN:  Effective Dates: 3-7-17 TO 17. Frequency/Duration: 2 times a week for 12 weeks  GOALS: (Goals have been discussed and agreed upon with patient.)  Short-Term Functional Goals: Time Frame: 6 weeks  1. Pt will report compliance with HEP. 2. Pt will be able to demonstrate neutral spine with sitting and standing positions. 3. Pt will perform 5 min walking endurance test.   Discharge Goals: Time Frame: 12 weeks  1. Pt will improve 5 min walking endurance test by 100 ft for improved mobility.   2. Pt will negotiate 1 flight of stairs with step over step to get to her bedroom at home. 3. Pt will demonstrate neutral spine in standing for 5 minutes to perform an ADL at home. Rehabilitation Potential For Stated Goals: Fair  Regarding Patricia Limon's therapy, I certify that the treatment plan above will be carried out by a therapist or under their direction. Thank you for this referral,    Tanika Underwood, PTA       Referring Physician Signature: Osman Deshpande*              Date                      HISTORY:   History of Present Injury/Illness (Reason for Referral):  Pt was injured in 2007 and continues to have symptoms from accident. Pain 8/10. Pain is constant, pain does go down her legs and arms. Numbness in her right hand. Pt reports her pain \"draws her forward\" and she doesn't like that. She would like to have exercises for improved posture. Past Medical History/Comorbidities:   neck surgery 2000 , 2007  Social History/Living Environment:    Pt lives alone. 2 story house with the bedroom on the 2nd floor. 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 Current Medications:       Current Outpatient Prescriptions:     LORazepam (ATIVAN) 1 mg tablet, Take 1 Tab by mouth nightly as needed for Anxiety. Max Daily Amount: 1 mg., Disp: 15 Tab, Rfl: 0    ZOLPIDEM TARTRATE (AMBIEN PO), Take 10 mg by mouth., Disp: , Rfl:     OXYCODONE HCL/ACETAMINOPHEN (PERCOCET PO), Take 10 mg by mouth., Disp: , Rfl:     citalopram (CELEXA) 20 mg tablet, Take 20 mg by mouth daily. , Disp: , Rfl:     pantoprazole (PROTONIX) 40 mg tablet, Take 40 mg by mouth daily. , Disp: , Rfl:     celecoxib (CELEBREX) 200 mg capsule, Take 200 mg by mouth two (2) times a day.  , Disp: , Rfl:     gabapentin (NEURONTIN) 300 mg capsule, Take 300 mg by mouth three (3) times daily.   , Disp: , Rfl:    Date Last Reviewed:  3-8-17   Number of Personal Factors/Comorbidities that affect the Plan of Care: 1-2: MODERATE COMPLEXITY   EXAMINATION:   Observation/Orthostatic Postural Assessment:          Pt sits with forward trunk and forward head posture. Pt stands with hip in anterior rotation, forward trunk position, forward head, and rounded shoulders. Sitting she leans to the right. Stairs: pt able to go up stairs one step at a time, very slowly. Pt refused to perform 5 min walking endurance test today. Body Structures Involved:  1. Joints  2. Muscles Body Functions Affected:  1. Neuromusculoskeletal Activities and Participation Affected:  1. Mobility  2. Self Care  3. Community, Social and [place]   Number of elements (examined above) that affect the Plan of Care: 3: MODERATE COMPLEXITY   CLINICAL PRESENTATION:   Presentation: Evolving clinical presentation with changing clinical characteristics: MODERATE COMPLEXITY   CLINICAL DECISION MAKING:   Outcome Measure: Tool Used: Modified Oswestry Low Back Pain Questionnaire  Score:  Initial: 35/50  Most Recent: X/50 (Date: -- )   Interpretation of Score: Each section is scored on a 0-5 scale, 5 representing the greatest disability. The scores of each section are added together for a total score of 50. Medical Necessity:   · Patient is expected to demonstrate progress in strength and endurance to improve mobility. Reason for Services/Other Comments:  · Patient continues to require skilled intervention due to decreased mobility and increased pain. Use of outcome tool(s) and clinical judgement create a POC that gives a: Questionable prediction of patient's progress: MODERATE COMPLEXITY            TREATMENT:   (In addition to Assessment/Re-Assessment sessions the following treatments were rendered)  Pre-treatment Symptoms/Complaints:   Pt states she is feeling better from her rash last week. Pain: Initial:     3/10 visually Post Session:  3/10     Aquatic Therapy (45 minutes):  Aquatic treatment performed per flow grid for Decreased muscle strength, Decreased endurance, Decreased range of motion, Decreased activity endurance, Decompression, Ease of movement and Low impact and reduced weight bearing activity. Cues provided for posture, gait and ex. Aquatic Exercise Log       Date  3/15 Date  3/24 Date  5/3 Date  6/27/17 Date  7/11/17 Date  7/25/16   Activity/ Exercise Parameters Parameters Parameters Parameters Parameters    Walking forward 6 6 6 6 6 6   Walking backward 6 6 6 6 6 6   Walking sideways 6 6 6 6 6 6     Marching 6 6 6 6 6 6     Goose Step 6 6 6 6 6 6     Tip toes 3 3 3 3 3 3     Heels 3 3 3 3 3 3     Lunges    Long step 6 Long step 6 Long step 6   Side step squats         LE Exercises    3.5' 4.5' 4.5'     Hip Flex/Ext 10 12 12 12 15 15     Hip Abd/Add 10 12 12 12 15 15     Hip IR/ER           Calf raises 10 12 12 12 15 15     Knee Flex 10 12 12 12 15 15     Squats           Leg Circles 10/10 12/12 12/12 12/12 15/15 15/15     Step Ups 10 12 12 12 15 15   UE Exercises           butterflies 10 12 12        Shoulder rolls 10x 12 12        Pull Down           Bicep/Tricep         Rows/Press outs          Chi Positions           Trunk Rotation         Deep H2O/ Noodles 7' with noodle and assist  7' with noodle and assist Declined 7' with arm rings 7' with blue rings     Stabilization           Arms only           Legs only         Cross   Country 1 min 2 min 2 min  2 min 2 min     Scissors 1 min 1 min 1 min  2 min 2 min     Jog   Jog 3 min  Jog 3 min  2 min 2 min   Lower abdominal   work       DKTC 2 min     Cardio           Jogging         Lap   Swimming           Stretches           Hamstrings           Heelcords           Piriformis           Neck           Pt sat in front of jet x 10 minutes after PT for pressure relief on back. Treatment/Session Assessment:    · Response to Treatment:  Pt did well with exercises in the water. · Compliance with Program/Exercises:  Will assess as treatment progresses.   · Recommendations/Intent for next treatment session: aquatic therapy for posture, mobility, strengthening  Total Treatment Duration: 45 minutes  PT Patient Time In/Time Out  Time In: 1300  Time Out: 454 [facility] [place]

## 2017-07-27 ENCOUNTER — HOSPITAL ENCOUNTER (OUTPATIENT)
Dept: PHYSICAL THERAPY | Age: 57
Discharge: HOME | End: 2017-07-27
Payer: COMMERCIAL

## 2017-08-10 ENCOUNTER — HOSPITAL ENCOUNTER (OUTPATIENT)
Dept: PHYSICAL THERAPY | Age: 57
Discharge: HOME | End: 2017-08-10

## 2017-08-17 ENCOUNTER — HOSPITAL ENCOUNTER (OUTPATIENT)
Dept: PHYSICAL THERAPY | Age: 57
Discharge: HOME | End: 2017-08-17
Payer: COMMERCIAL

## 2017-08-17 PROCEDURE — 97113 AQUATIC THERAPY/EXERCISES: CPT

## 2017-08-17 NOTE — PROGRESS NOTES
Johanna Arce  : 1960 [facility] at [facility] PRASHANT RAO  1101 [facility], [address] [place] [address] 83,[address], [address] [facility]  [phone]   [phone]       OUTPATIENT PHYSICAL THERAPY:Daily Note 2017    ICD-10: Treatment Diagnosis: Difficulty in walking, not elsewhere classified (R26.2); Low back pain (M54.5)  Precautions/Allergies:   Pcn [penicillins] and Aspirin   Fall Risk Score: 2 (? 5 = High Risk)  MD Orders: water therapy with [facility]; eval and treat for 8 weeks MEDICAL/REFERRING DIAGNOSIS:  back pain   DATE OF ONSET: chronic  REFERRING PHYSICIAN: Luis Carlos [facility]: unknown     INITIAL ASSESSMENT:  Ms Irving Samayoa presents to PT with complaints of pain, decreased strength, and decreased posture. She has complaints of pain from the back of her head throughout her entire spine and all 4 extremities. She has difficulty with mobility and performing her ADL's. She has done aquatics in the past and it has been helpful. She will benefit from skilled physical therapy to help decrease pain and improve mobility. PROBLEM LIST (Impacting functional limitations):  1. Decreased Strength  2. Decreased ADL/Functional Activities   3. Decreased endurance  4. Increased pain INTERVENTIONS PLANNED:  1. aquatic therapy   2. Therapeutic exercise for ROM, strengthening, flexibility  3. Manual therapy  4. Modalities as needed for pain   TREATMENT PLAN:  Effective Dates: 3-7-17 TO 17. Frequency/Duration: 2 times a week for 12 weeks  GOALS: (Goals have been discussed and agreed upon with patient.)  Short-Term Functional Goals: Time Frame: 6 weeks  1. Pt will report compliance with HEP. 2. Pt will be able to demonstrate neutral spine with sitting and standing positions. 3. Pt will perform 5 min walking endurance test.   Discharge Goals: Time Frame: 12 weeks  1. Pt will improve 5 min walking endurance test by 100 ft for improved mobility.   2. Pt will negotiate 1 flight of stairs with step over step to get to her bedroom at home. 3. Pt will demonstrate neutral spine in standing for 5 minutes to perform an ADL at home. Rehabilitation Potential For Stated Goals: Fair  Regarding Patricia Limon's therapy, I certify that the treatment plan above will be carried out by a therapist or under their direction. Thank you for this referral,    Kathryn Omer PTA       Referring Physician Signature: Afsaneh Brought*              Date                      HISTORY:   History of Present Injury/Illness (Reason for Referral):  Pt was injured in 2007 and continues to have symptoms from accident. Pain 8/10. Pain is constant, pain does go down her legs and arms. Numbness in her right hand. Pt reports her pain \"draws her forward\" and she doesn't like that. She would like to have exercises for improved posture. Past Medical History/Comorbidities:   neck surgery 2000 , 2007  Social History/Living Environment:    Pt lives alone. 2 story house with the bedroom on the 2nd floor. 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 Current Medications:       Current Outpatient Prescriptions:     LORazepam (ATIVAN) 1 mg tablet, Take 1 Tab by mouth nightly as needed for Anxiety. Max Daily Amount: 1 mg., Disp: 15 Tab, Rfl: 0    ZOLPIDEM TARTRATE (AMBIEN PO), Take 10 mg by mouth., Disp: , Rfl:     OXYCODONE HCL/ACETAMINOPHEN (PERCOCET PO), Take 10 mg by mouth., Disp: , Rfl:     citalopram (CELEXA) 20 mg tablet, Take 20 mg by mouth daily. , Disp: , Rfl:     pantoprazole (PROTONIX) 40 mg tablet, Take 40 mg by mouth daily. , Disp: , Rfl:     celecoxib (CELEBREX) 200 mg capsule, Take 200 mg by mouth two (2) times a day.  , Disp: , Rfl:     gabapentin (NEURONTIN) 300 mg capsule, Take 300 mg by mouth three (3) times daily.   , Disp: , Rfl:    Date Last Reviewed:  3-8-17   Number of Personal Factors/Comorbidities that affect the Plan of Care: 1-2: MODERATE COMPLEXITY   EXAMINATION:   Observation/Orthostatic Postural Assessment:          Pt sits with forward trunk and forward head posture. Pt stands with hip in anterior rotation, forward trunk position, forward head, and rounded shoulders. Sitting she leans to the right. Stairs: pt able to go up stairs one step at a time, very slowly. Pt refused to perform 5 min walking endurance test today. Body Structures Involved:  1. Joints  2. Muscles Body Functions Affected:  1. Neuromusculoskeletal Activities and Participation Affected:  1. Mobility  2. Self Care  3. Community, Social and [place]   Number of elements (examined above) that affect the Plan of Care: 3: MODERATE COMPLEXITY   CLINICAL PRESENTATION:   Presentation: Evolving clinical presentation with changing clinical characteristics: MODERATE COMPLEXITY   CLINICAL DECISION MAKING:   Outcome Measure: Tool Used: Modified Oswestry Low Back Pain Questionnaire  Score:  Initial: 35/50  Most Recent: X/50 (Date: -- )   Interpretation of Score: Each section is scored on a 0-5 scale, 5 representing the greatest disability. The scores of each section are added together for a total score of 50. Medical Necessity:   · Patient is expected to demonstrate progress in strength and endurance to improve mobility. Reason for Services/Other Comments:  · Patient continues to require skilled intervention due to decreased mobility and increased pain. Use of outcome tool(s) and clinical judgement create a POC that gives a: Questionable prediction of patient's progress: MODERATE COMPLEXITY            TREATMENT:   (In addition to Assessment/Re-Assessment sessions the following treatments were rendered)  Pre-treatment Symptoms/Complaints:   Pt states she is having increased pain in hip and back area. Pain: Initial:     3/10 visually Post Session:  3/10     Aquatic Therapy (45 minutes):  Aquatic treatment performed per flow grid for Decreased muscle strength, Decreased endurance, Decreased range of motion, Decreased activity endurance, Decompression, Ease of movement and Low impact and reduced weight bearing activity. Cues provided for posture, gait and ex. Aquatic Exercise Log       Date  7/25/16 Date  8/17/17   Activity/ Exercise     Walking forward 6 6   Walking backward 6 6   Walking sideways 6 6     Marching 6 6     Goose Step 6 6     Tip toes 3 3     Heels 3 3     Lunges Long step 6 Long step 6   Side step squats     LE Exercises 4.5' 4.5''     Hip Flex/Ext 15 15     Hip Abd/Add 15 15     Hip IR/ER       Calf raises 15 15     Knee Flex 15 15     Squats       Leg Circles 15/15 15/15     Step Ups 15 15   UE Exercises       butterflies       Shoulder rolls       Pull Down       Bicep/Tricep     Rows/Press outs      Chi Positions       Trunk Rotation     Deep H2O/ Noodles 7' with blue rings 7' with blue rings     Stabilization       Arms only       Legs only     Cross   Country 2 min 2 min     Scissors 2 min 2 min     Jog   2 min 2 min   Lower abdominal   work  DKTC 2 min DTKC 2 min     Cardio       Jogging     Lap   Swimming       Stretches       Hamstrings       Heelcords       Piriformis       Neck       Pt sat in front of jet x 5 minutes after PT for pressure relief on back. Treatment/Session Assessment:    · Response to Treatment:  Pt did well with exercises in the water. · Compliance with Program/Exercises: Will assess as treatment progresses.   · Recommendations/Intent for next treatment session: aquatic therapy for posture, mobility, strengthening  Total Treatment Duration: 45 minutes  PT Patient Time In/Time Out  Time In: 1300  Time Out: 454 [facility], [facility]

## 2017-10-04 ENCOUNTER — HOSPITAL ENCOUNTER (OUTPATIENT)
Dept: PHYSICAL THERAPY | Age: 57
Discharge: HOME | End: 2017-10-04
Payer: COMMERCIAL

## 2017-10-04 PROCEDURE — 97164 PT RE-EVAL EST PLAN CARE: CPT

## 2017-10-04 PROCEDURE — 97113 AQUATIC THERAPY/EXERCISES: CPT

## 2017-10-04 NOTE — PROGRESS NOTES
Delmon Moritz  : 1960 [facility] PRASHANT RAO  1101 [facility], [address] [place] [address] 83,[address] 527, [address] [facility]  [phone]   [phone]       OUTPATIENT PHYSICAL THERAPY:Daily Note and Recertification     ICD-10: Treatment Diagnosis: Difficulty in walking, not elsewhere classified (R26.2); Low back pain (M54.5)  Precautions/Allergies:   Pcn [penicillins] and Aspirin   Fall Risk Score: 2 (? 5 = High Risk)  MD Orders: water therapy with [facility]; eval and treat for 8 weeks MEDICAL/REFERRING DIAGNOSIS:  back pain   DATE OF ONSET: chronic  REFERRING PHYSICIAN: Luis Carlos [address] [place] [address]: sometime in Oct uncertain of day. Ms Tobi Zazueta has not been to therapy since Aug 17th. She returns for therapy to finish her last 6 visits that were previously approved. She states she has been taking care of both of her parents who were hospitalized. Her function and objective measures have not changed. She continues to report pain from the back of her head down through her entire spine, hips and legs. The plan is for her to continue in the pool with an aquatic program for her last 6 visits. INITIAL ASSESSMENT:  Ms Tobi Zazueta presents to PT with complaints of pain, decreased strength, and decreased posture. She has complaints of pain from the back of her head throughout her entire spine and all 4 extremities. She has difficulty with mobility and performing her ADL's. She has done aquatics in the past and it has been helpful. She will benefit from skilled physical therapy to help decrease pain and improve mobility. PROBLEM LIST (Impacting functional limitations):  1. Decreased Strength  2. Decreased ADL/Functional Activities   3. Decreased endurance  4. Increased pain INTERVENTIONS PLANNED:  1. aquatic therapy   2. Therapeutic exercise for ROM, strengthening, flexibility  3. Manual therapy  4.  Modalities as needed for pain   TREATMENT PLAN:  Effective Dates: 10-4-17 TO 1-4-18. Frequency/Duration: 2 times a week for 3 weeks  GOALS: (Goals have been discussed and agreed upon with patient.)  Short-Term Functional Goals: Time Frame: 6 weeks  1. Pt will report compliance with HEP. ongoing  2. Pt will be able to demonstrate neutral spine with sitting and standing positions. ongoing  3. Pt will perform 5 min walking endurance test.   ongoing  Discharge Goals: Time Frame: 12 weeks  ongoing  1. Pt will improve 5 min walking endurance test by 100 ft for improved mobility. 2. Pt will negotiate 1 flight of stairs with step over step to get to her bedroom at home. 3. Pt will demonstrate neutral spine in standing for 5 minutes to perform an ADL at home. Rehabilitation Potential For Stated Goals: Fair  Regarding Patricia Limon's therapy, I certify that the treatment plan above will be carried out by a therapist or under their direction. Thank you for this referral,    Aramis Day, PT       Referring Physician Signature: Berny Perez*              Date                      HISTORY:   History of Present Injury/Illness (Reason for Referral):  Pt was injured in 2007 and continues to have symptoms from accident. Pain 8/10. Pain is constant, pain does go down her legs and arms. Numbness in her right hand. Pt reports her pain \"draws her forward\" and she doesn't like that. She would like to have exercises for improved posture. Past Medical History/Comorbidities:   neck surgery 2000 , 2007  Social History/Living Environment:    Pt lives alone. 2 story house with the bedroom on the 2nd floor. 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 Current Medications:       Current Outpatient Prescriptions:     LORazepam (ATIVAN) 1 mg tablet, Take 1 Tab by mouth nightly as needed for Anxiety.  Max Daily Amount: 1 mg., Disp: 15 Tab, Rfl: 0    ZOLPIDEM TARTRATE (AMBIEN PO), Take 10 mg by mouth., Disp: , Rfl:     OXYCODONE HCL/ACETAMINOPHEN (PERCOCET PO), Take 10 mg by mouth., Disp: , Rfl:     citalopram (CELEXA) 20 mg tablet, Take 20 mg by mouth daily. , Disp: , Rfl:     pantoprazole (PROTONIX) 40 mg tablet, Take 40 mg by mouth daily. , Disp: , Rfl:     celecoxib (CELEBREX) 200 mg capsule, Take 200 mg by mouth two (2) times a day.  , Disp: , Rfl:     gabapentin (NEURONTIN) 300 mg capsule, Take 300 mg by mouth three (3) times daily. , Disp: , Rfl:    Date Last Reviewed:  10/4/17   Number of Personal Factors/Comorbidities that affect the Plan of Care: 1-2: MODERATE COMPLEXITY   EXAMINATION:   Observation/Orthostatic Postural Assessment:   Performed on 3/7/17        Pt sits with forward trunk and forward head posture. Pt stands with hip in anterior rotation, forward trunk position, forward head, and rounded shoulders. Sitting she leans to the right. Stairs: pt able to go up stairs one step at a time, very slowly. Pt refused to perform 5 min walking endurance test today. Body Structures Involved:  1. Joints  2. Muscles Body Functions Affected:  1. Neuromusculoskeletal Activities and Participation Affected:  1. Mobility  2. Self Care  3. Community, Social and [place]   Number of elements (examined above) that affect the Plan of Care: 3: MODERATE COMPLEXITY   CLINICAL PRESENTATION:   Presentation: Evolving clinical presentation with changing clinical characteristics: MODERATE COMPLEXITY   CLINICAL DECISION MAKING:   Outcome Measure: Tool Used: Modified Oswestry Low Back Pain Questionnaire  Score:  Initial: 35/50  3/7/17 Most Recent: 32/50 (Date: -10/4/17- )   Interpretation of Score: Each section is scored on a 0-5 scale, 5 representing the greatest disability. The scores of each section are added together for a total score of 50. Medical Necessity:   · Patient is expected to demonstrate progress in strength and endurance to improve mobility.   Reason for Services/Other Comments:  · Patient continues to require skilled intervention due to decreased mobility and increased pain. Use of outcome tool(s) and clinical judgement create a POC that gives a: Questionable prediction of patient's progress: MODERATE COMPLEXITY            TREATMENT:   (In addition to Assessment/Re-Assessment sessions the following treatments were rendered)  Pre-treatment Symptoms/Complaints:   Pt states she is having pain in her hips and legs today as well as her spine. Pain: Initial:     8/10 Post Session:  3/10     Aquatic Therapy (45 minutes): Aquatic treatment performed per flow grid for Decreased muscle strength, Decreased endurance, Decreased range of motion, Decreased activity endurance, Decompression, Ease of movement and Low impact and reduced weight bearing activity. Cues provided for posture, gait and ex. Aquatic Exercise Log       Date  10/4/17   Activity/ Exercise    Walking forward 6   Walking backward 6   Walking sideways 6     Marching 6     Goose Step 6     Tip toes 3     Heels 3     Lunges Long step 6   Side step squats    LE Exercises 4.5''     Hip Flex/Ext 15     Hip Abd/Add 15     Hip IR/ER      Calf raises 15     Knee Flex 15     Squats      Leg Circles 15/15     Step Ups 15   UE Exercises      butterflies      Shoulder rolls      Pull Down      Bicep/Tricep    Rows/Press outs     Chi Positions      Trunk Rotation    Deep H2O/ Noodles      Stabilization      Arms only      Legs only    Leandro Controls      Jog      Lower abdominal   work       Cardio      Jogging    Lap   Swimming      Stretches      Hamstrings      Heelcords      Piriformis      Neck      Pt sat in front of jet x 5 minutes after PT for pressure relief on back. Treatment/Session Assessment:    · Response to Treatment:  Pt did well with aquatic exercises. · Compliance with Program/Exercises: Will assess as treatment progresses.   · Recommendations/Intent for next treatment session: aquatic therapy for posture, mobility, strengthening.   Total Treatment Duration: 45 minutes       Kaitlyn Ta, PT

## 2017-10-12 ENCOUNTER — HOSPITAL ENCOUNTER (OUTPATIENT)
Dept: PHYSICAL THERAPY | Age: 57
Discharge: HOME | End: 2017-10-12
Payer: COMMERCIAL

## 2017-10-12 PROCEDURE — 97113 AQUATIC THERAPY/EXERCISES: CPT

## 2017-10-12 NOTE — PROGRESS NOTES
Kiesha Dobson  : 1960 [facility] PRASHANT RAO  [address] [facility], [address] [place] [address] [address], [facility] [facility]  [phone]   [phone]       OUTPATIENT PHYSICAL THERAPY:Daily Note and Aquatic 10/12/2017    ICD-10: Treatment Diagnosis: Difficulty in walking, not elsewhere classified (R26.2); Low back pain (M54.5)  Precautions/Allergies:   Pcn [penicillins] and Aspirin   Fall Risk Score: 2 (? 5 = High Risk)  MD Orders: water therapy with [address] [place] [address] at [facility]; eval and treat for 8 weeks MEDICAL/REFERRING DIAGNOSIS:  back pain   DATE OF ONSET: chronic  REFERRING PHYSICIAN: Luis Carlos [address] [place] [address]: sometime in Oct uncertain of day. Ms Arnulfo King has not been to therapy since Aug 17th. She returns for therapy to finish her last 6 visits that were previously approved. She states she has been taking care of both of her parents who were hospitalized. Her function and objective measures have not changed. She continues to report pain from the back of her head down through her entire spine, hips and legs. The plan is for her to continue in the pool with an aquatic program for her last 6 visits. INITIAL ASSESSMENT:  Ms Arnulfo King presents to PT with complaints of pain, decreased strength, and decreased posture. She has complaints of pain from the back of her head throughout her entire spine and all 4 extremities. She has difficulty with mobility and performing her ADL's. She has done aquatics in the past and it has been helpful. She will benefit from skilled physical therapy to help decrease pain and improve mobility. PROBLEM LIST (Impacting functional limitations):  1. Decreased Strength  2. Decreased ADL/Functional Activities   3. Decreased endurance  4. Increased pain INTERVENTIONS PLANNED:  1. aquatic therapy   2. Therapeutic exercise for ROM, strengthening, flexibility  3. Manual therapy  4.  Modalities as needed for pain   TREATMENT PLAN:  Effective Dates: 10-4-17 TO 1-4-18. Frequency/Duration: 2 times a week for 3 weeks  GOALS: (Goals have been discussed and agreed upon with patient.)  Short-Term Functional Goals: Time Frame: 6 weeks  1. Pt will report compliance with HEP. ongoing  2. Pt will be able to demonstrate neutral spine with sitting and standing positions. ongoing  3. Pt will perform 5 min walking endurance test.   ongoing  Discharge Goals: Time Frame: 12 weeks  ongoing  1. Pt will improve 5 min walking endurance test by 100 ft for improved mobility. 2. Pt will negotiate 1 flight of stairs with step over step to get to her bedroom at home. 3. Pt will demonstrate neutral spine in standing for 5 minutes to perform an ADL at home. Rehabilitation Potential For Stated Goals: Fair  Regarding Patricia Limon's therapy, I certify that the treatment plan above will be carried out by a therapist or under their direction. Thank you for this referral,    Louie Scruggs PTA       Referring Physician Signature: Gustavo Marcial*              Date                      HISTORY:   History of Present Injury/Illness (Reason for Referral):  Pt was injured in 2007 and continues to have symptoms from accident. Pain 8/10. Pain is constant, pain does go down her legs and arms. Numbness in her right hand. Pt reports her pain \"draws her forward\" and she doesn't like that. She would like to have exercises for improved posture. Past Medical History/Comorbidities:   neck surgery 2000 , 2007  Social History/Living Environment:    Pt lives alone. 2 story house with the bedroom on the 2nd floor. 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 Current Medications:       Current Outpatient Prescriptions:     LORazepam (ATIVAN) 1 mg tablet, Take 1 Tab by mouth nightly as needed for Anxiety.  Max Daily Amount: 1 mg., Disp: 15 Tab, Rfl: 0    ZOLPIDEM TARTRATE (AMBIEN PO), Take 10 mg by mouth., Disp: , Rfl:     OXYCODONE HCL/ACETAMINOPHEN (PERCOCET PO), Take 10 mg by mouth., Disp: , Rfl:     citalopram (CELEXA) 20 mg tablet, Take 20 mg by mouth daily. , Disp: , Rfl:     pantoprazole (PROTONIX) 40 mg tablet, Take 40 mg by mouth daily. , Disp: , Rfl:     celecoxib (CELEBREX) 200 mg capsule, Take 200 mg by mouth two (2) times a day.  , Disp: , Rfl:     gabapentin (NEURONTIN) 300 mg capsule, Take 300 mg by mouth three (3) times daily. , Disp: , Rfl:    Date Last Reviewed:  10/4/17   Number of Personal Factors/Comorbidities that affect the Plan of Care: 1-2: MODERATE COMPLEXITY   EXAMINATION:   Observation/Orthostatic Postural Assessment:   Performed on 3/7/17        Pt sits with forward trunk and forward head posture. Pt stands with hip in anterior rotation, forward trunk position, forward head, and rounded shoulders. Sitting she leans to the right. Stairs: pt able to go up stairs one step at a time, very slowly. Pt refused to perform 5 min walking endurance test today. Body Structures Involved:  1. Joints  2. Muscles Body Functions Affected:  1. Neuromusculoskeletal Activities and Participation Affected:  1. Mobility  2. Self Care  3. Community, Social and [place]   Number of elements (examined above) that affect the Plan of Care: 3: MODERATE COMPLEXITY   CLINICAL PRESENTATION:   Presentation: Evolving clinical presentation with changing clinical characteristics: MODERATE COMPLEXITY   CLINICAL DECISION MAKING:   Outcome Measure: Tool Used: Modified Oswestry Low Back Pain Questionnaire  Score:  Initial: 35/50  3/7/17 Most Recent: 32/50 (Date: -10/4/17- )   Interpretation of Score: Each section is scored on a 0-5 scale, 5 representing the greatest disability. The scores of each section are added together for a total score of 50. Medical Necessity:   · Patient is expected to demonstrate progress in strength and endurance to improve mobility.   Reason for Services/Other Comments:  · Patient continues to require skilled intervention due to decreased mobility and increased pain. Use of outcome tool(s) and clinical judgement create a POC that gives a: Questionable prediction of patient's progress: MODERATE COMPLEXITY            TREATMENT:   (In addition to Assessment/Re-Assessment sessions the following treatments were rendered)  Pre-treatment Symptoms/Complaints:   Pt with minimal complaints today. Pain: Initial:     not rated Post Session:  3/10     Aquatic Therapy (45 minutes): Aquatic treatment performed per flow grid for Decreased muscle strength, Decreased endurance, Decreased range of motion, Decreased activity endurance, Decompression, Ease of movement and Low impact and reduced weight bearing activity. Cues provided for posture, gait and ex. Aquatic Exercise Log       Date  10/4/17 Date  10/10/17 Date  10/12/17   Activity/ Exercise      Walking forward 6 6 6   Walking backward 6 6 6   Walking sideways 6 6 6     Marching 6 6 6     Goose Step 6 6 6     Tip toes 3 3 3     Heels 3 3 3     Lunges Long step 6 Long step 6 Long step 6   Side step squats      LE Exercises 4.5'' 3.5' with ring on foot 3. 5' with ring on foot     Hip Flex/Ext 15 15 15     Hip Abd/Add 15 15 15     Hip IR/ER        Calf raises 15       Knee Flex 15 15 15     Squats        Leg Circles 15/15 15/15 15/15     Step Ups 15 15 15   UE Exercises        butterflies        Shoulder rolls        Pull Down        Bicep/Tricep      Rows/Press outs       Chi Positions        Trunk Rotation      Deep H2O/ Noodles  7' with noodle 7' with noodle      Stabilization        Arms only        Legs only      Cross   Country  2 min 2 min     Scissors  2 min 2 min     Jog    2 min 2 min   Lower abdominal   work   2 min 2 min     Cardio        Jogging      Lap   Swimming        Stretches        Hamstrings        Heelcords        Piriformis        Neck        Pt sat in front of jet x 5 minutes after PT for pressure relief on back. Treatment/Session Assessment:    · Response to Treatment:  Pt did well with aquatic exercises. · Compliance with Program/Exercises: Will assess as treatment progresses. · Recommendations/Intent for next treatment session: aquatic therapy for posture, mobility, strengthening.   Total Treatment Duration: 45 minutes  PT Patient Time In/Time Out  Time In: 1045  Time Out: [address] [place], [facility]

## 2017-10-17 ENCOUNTER — HOSPITAL ENCOUNTER (OUTPATIENT)
Dept: PHYSICAL THERAPY | Age: 57
Discharge: HOME | End: 2017-10-17
Payer: COMMERCIAL

## 2017-10-17 PROCEDURE — 97113 AQUATIC THERAPY/EXERCISES: CPT

## 2017-10-17 NOTE — PROGRESS NOTES
Mitzy Moore  : 1960 [facility] at [facility] PRASHANT RAO  [address] [facility], [address] [place] [address],[address], [facility].  [phone]   [phone]       OUTPATIENT PHYSICAL THERAPY:Daily Note and Aquatic 10/17/2017    ICD-10: Treatment Diagnosis: Difficulty in walking, not elsewhere classified (R26.2); Low back pain (M54.5)  Precautions/Allergies:   Pcn [penicillins] and Aspirin   Fall Risk Score: 2 (? 5 = High Risk)  MD Orders: water therapy with [facility]; eval and treat for 8 weeks MEDICAL/REFERRING DIAGNOSIS:  back pain   DATE OF ONSET: chronic  REFERRING PHYSICIAN: Luis Carlos [address] [place] [address]: sometime in Oct uncertain of day. Ms Elmer Gil has not been to therapy since Aug 17th. She returns for therapy to finish her last 6 visits that were previously approved. She states she has been taking care of both of her parents who were hospitalized. Her function and objective measures have not changed. She continues to report pain from the back of her head down through her entire spine, hips and legs. The plan is for her to continue in the pool with an aquatic program for her last 6 visits. INITIAL ASSESSMENT:  Ms Elmer Gil presents to PT with complaints of pain, decreased strength, and decreased posture. She has complaints of pain from the back of her head throughout her entire spine and all 4 extremities. She has difficulty with mobility and performing her ADL's. She has done aquatics in the past and it has been helpful. She will benefit from skilled physical therapy to help decrease pain and improve mobility. PROBLEM LIST (Impacting functional limitations):  1. Decreased Strength  2. Decreased ADL/Functional Activities   3. Decreased endurance  4. Increased pain INTERVENTIONS PLANNED:  1. aquatic therapy   2. Therapeutic exercise for ROM, strengthening, flexibility  3. Manual therapy  4.  Modalities as needed for pain   TREATMENT PLAN:  Effective Dates: 10-4-17 TO 1-4-18. Frequency/Duration: 2 times a week for 3 weeks  GOALS: (Goals have been discussed and agreed upon with patient.)  Short-Term Functional Goals: Time Frame: 6 weeks  1. Pt will report compliance with HEP. ongoing  2. Pt will be able to demonstrate neutral spine with sitting and standing positions. ongoing  3. Pt will perform 5 min walking endurance test.   ongoing  Discharge Goals: Time Frame: 12 weeks  ongoing  1. Pt will improve 5 min walking endurance test by 100 ft for improved mobility. 2. Pt will negotiate 1 flight of stairs with step over step to get to her bedroom at home. 3. Pt will demonstrate neutral spine in standing for 5 minutes to perform an ADL at home. Rehabilitation Potential For Stated Goals: Fair  Regarding Patricia Limon's therapy, I certify that the treatment plan above will be carried out by a therapist or under their direction. Thank you for this referral,    Kavon Burrell PTA       Referring Physician Signature: Werner Citizen*              Date                      HISTORY:   History of Present Injury/Illness (Reason for Referral):  Pt was injured in 2007 and continues to have symptoms from accident. Pain 8/10. Pain is constant, pain does go down her legs and arms. Numbness in her right hand. Pt reports her pain \"draws her forward\" and she doesn't like that. She would like to have exercises for improved posture. Past Medical History/Comorbidities:   neck surgery 2000 , 2007  Social History/Living Environment:    Pt lives alone. 2 story house with the bedroom on the 2nd floor. 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 Current Medications:       Current Outpatient Prescriptions:     LORazepam (ATIVAN) 1 mg tablet, Take 1 Tab by mouth nightly as needed for Anxiety.  Max Daily Amount: 1 mg., Disp: 15 Tab, Rfl: 0    ZOLPIDEM TARTRATE (AMBIEN PO), Take 10 mg by mouth., Disp: , Rfl:     OXYCODONE HCL/ACETAMINOPHEN (PERCOCET PO), Take 10 mg by mouth., Disp: , Rfl:     citalopram (CELEXA) 20 mg tablet, Take 20 mg by mouth daily. , Disp: , Rfl:     pantoprazole (PROTONIX) 40 mg tablet, Take 40 mg by mouth daily. , Disp: , Rfl:     celecoxib (CELEBREX) 200 mg capsule, Take 200 mg by mouth two (2) times a day.  , Disp: , Rfl:     gabapentin (NEURONTIN) 300 mg capsule, Take 300 mg by mouth three (3) times daily. , Disp: , Rfl:    Date Last Reviewed:  10/4/17   Number of Personal Factors/Comorbidities that affect the Plan of Care: 1-2: MODERATE COMPLEXITY   EXAMINATION:   Observation/Orthostatic Postural Assessment:   Performed on 3/7/17        Pt sits with forward trunk and forward head posture. Pt stands with hip in anterior rotation, forward trunk position, forward head, and rounded shoulders. Sitting she leans to the right. Stairs: pt able to go up stairs one step at a time, very slowly. Pt refused to perform 5 min walking endurance test today. Body Structures Involved:  1. Joints  2. Muscles Body Functions Affected:  1. Neuromusculoskeletal Activities and Participation Affected:  1. Mobility  2. Self Care  3. Community, Social and [place]   Number of elements (examined above) that affect the Plan of Care: 3: MODERATE COMPLEXITY   CLINICAL PRESENTATION:   Presentation: Evolving clinical presentation with changing clinical characteristics: MODERATE COMPLEXITY   CLINICAL DECISION MAKING:   Outcome Measure: Tool Used: Modified Oswestry Low Back Pain Questionnaire  Score:  Initial: 35/50  3/7/17 Most Recent: 32/50 (Date: -10/4/17- )   Interpretation of Score: Each section is scored on a 0-5 scale, 5 representing the greatest disability. The scores of each section are added together for a total score of 50. Medical Necessity:   · Patient is expected to demonstrate progress in strength and endurance to improve mobility.   Reason for Services/Other Comments:  · Patient continues to require skilled intervention due to decreased mobility and increased pain. Use of outcome tool(s) and clinical judgement create a POC that gives a: Questionable prediction of patient's progress: MODERATE COMPLEXITY            TREATMENT:   (In addition to Assessment/Re-Assessment sessions the following treatments were rendered)  Pre-treatment Symptoms/Complaints:   Pt doing well today. Pain: Initial:     not rated Post Session:  3/10     Aquatic Therapy (45 minutes): Aquatic treatment performed per flow grid for Decreased muscle strength, Decreased endurance, Decreased range of motion, Decreased activity endurance, Decompression, Ease of movement and Low impact and reduced weight bearing activity. Cues provided for posture, gait and ex. Aquatic Exercise Log       Date  10/4/17 Date  10/10/17 Date  10/12/17 Date  10/17/17   Activity/ Exercise       Walking forward 6 6 6 6   Walking backward 6 6 6 6   Walking sideways 6 6 6 6     Marching 6 6 6 6     Goose Step 6 6 6 6     Tip toes 3 3 3 3     Heels 3 3 3 3     Lunges Long step 6 Long step 6 Long step 6 Long step 6   Side step squats       LE Exercises 4.5'' 3.5' with ring on foot 3. 5' with ring on foot 3. 5' without restistance     Hip Flex/Ext 15 15 15 15     Hip Abd/Add 15 15 15 15     Hip IR/ER         Calf raises 15        Knee Flex 15 15 15 15     Squats         Leg Circles 15/15 15/15 15/15 15/15     Step Ups 15 15 15 15   UE Exercises         butterflies         Shoulder rolls         Pull Down         Bicep/Tricep       Rows/Press outs        Chi Positions         Trunk Rotation       Deep H2O/ Noodles  7' with noodle 7' with noodle  7' with noodle     Stabilization         Arms only         Legs only       Cross   Country  2 min 2 min 2 min     Scissors  2 min 2 min 2 min     Jog    2 min 2 min 2 min   Lower abdominal   work   2 min 2 min DKTC 2 min     Cardio         Jogging       Lap   Swimming         Stretches Hamstrings         Heelcords         Piriformis         Neck         Pt sat in front of jet x 5 minutes after PT for pressure relief on back. Treatment/Session Assessment:    · Response to Treatment:  Pt did well with aquatic exercises. · Compliance with Program/Exercises: Will assess as treatment progresses. · Recommendations/Intent for next treatment session: aquatic therapy for posture, mobility, strengthening.   Total Treatment Duration: 45 minutes  PT Patient Time In/Time Out  Time In: 1100  Time Out: Ana 232, PTA

## 2017-10-24 ENCOUNTER — HOSPITAL ENCOUNTER (OUTPATIENT)
Dept: PHYSICAL THERAPY | Age: 57
Discharge: HOME | End: 2017-10-24
Payer: COMMERCIAL

## 2017-10-24 PROCEDURE — 97113 AQUATIC THERAPY/EXERCISES: CPT

## 2017-10-24 NOTE — PROGRESS NOTES
Mathew Langley  : 1960 [facility] at [facility] PRASHANT RAO  [address] [facility], [address] [place] [address],[address], [facility] [address].  [phone]   [phone]       OUTPATIENT PHYSICAL THERAPY:Daily Note and Aquatic 10/24/2017    ICD-10: Treatment Diagnosis: Difficulty in walking, not elsewhere classified (R26.2); Low back pain (M54.5)  Precautions/Allergies:   Pcn [penicillins] and Aspirin   Fall Risk Score: 2 (? 5 = High Risk)  MD Orders: water therapy with u[facility]sit at [facility]; eval and treat for 8 weeks MEDICAL/REFERRING DIAGNOSIS:  back pain   DATE OF ONSET: chronic  REFERRING PHYSICIAN: Luis Carlos [address] [place] [address]: sometime in Oct uncertain of day. Ms Lu Martinez has not been to therapy since Aug 17th. She returns for therapy to finish her last 6 visits that were previously approved. She states she has been taking care of both of her parents who were hospitalized. Her function and objective measures have not changed. She continues to report pain from the back of her head down through her entire spine, hips and legs. The plan is for her to continue in the pool with an aquatic program for her last 6 visits. INITIAL ASSESSMENT:  Ms Lu Martinez presents to PT with complaints of pain, decreased strength, and decreased posture. She has complaints of pain from the back of her head throughout her entire spine and all 4 extremities. She has difficulty with mobility and performing her ADL's. She has done aquatics in the past and it has been helpful. She will benefit from skilled physical therapy to help decrease pain and improve mobility. PROBLEM LIST (Impacting functional limitations):  1. Decreased Strength  2. Decreased ADL/Functional Activities   3. Decreased endurance  4. Increased pain INTERVENTIONS PLANNED:  1. aquatic therapy   2. Therapeutic exercise for ROM, strengthening, flexibility  3. Manual therapy  4.  Modalities as needed for pain   TREATMENT PLAN:  Effective Dates: 10-4-17 TO 1-4-18. Frequency/Duration: 2 times a week for 3 weeks  GOALS: (Goals have been discussed and agreed upon with patient.)  Short-Term Functional Goals: Time Frame: 6 weeks  1. Pt will report compliance with HEP. ongoing  2. Pt will be able to demonstrate neutral spine with sitting and standing positions. ongoing  3. Pt will perform 5 min walking endurance test.   ongoing  Discharge Goals: Time Frame: 12 weeks  ongoing  1. Pt will improve 5 min walking endurance test by 100 ft for improved mobility. 2. Pt will negotiate 1 flight of stairs with step over step to get to her bedroom at home. 3. Pt will demonstrate neutral spine in standing for 5 minutes to perform an ADL at home. Rehabilitation Potential For Stated Goals: Fair  Regarding Patricia Limon's therapy, I certify that the treatment plan above will be carried out by a therapist or under their direction. Thank you for this referral,    Kavon Burrell PTA       Referring Physician Signature: Werner Citizen*              Date                      HISTORY:   History of Present Injury/Illness (Reason for Referral):  Pt was injured in 2007 and continues to have symptoms from accident. Pain 8/10. Pain is constant, pain does go down her legs and arms. Numbness in her right hand. Pt reports her pain \"draws her forward\" and she doesn't like that. She would like to have exercises for improved posture. Past Medical History/Comorbidities:   neck surgery 2000 , 2007  Social History/Living Environment:    Pt lives alone. 2 story house with the bedroom on the 2nd floor. 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 Current Medications:       Current Outpatient Prescriptions:     LORazepam (ATIVAN) 1 mg tablet, Take 1 Tab by mouth nightly as needed for Anxiety.  Max Daily Amount: 1 mg., Disp: 15 Tab, Rfl: 0    ZOLPIDEM TARTRATE (AMBIEN PO), Take 10 mg by mouth., Disp: , Rfl:     OXYCODONE HCL/ACETAMINOPHEN (PERCOCET PO), Take 10 mg by mouth., Disp: , Rfl:     citalopram (CELEXA) 20 mg tablet, Take 20 mg by mouth daily. , Disp: , Rfl:     pantoprazole (PROTONIX) 40 mg tablet, Take 40 mg by mouth daily. , Disp: , Rfl:     celecoxib (CELEBREX) 200 mg capsule, Take 200 mg by mouth two (2) times a day.  , Disp: , Rfl:     gabapentin (NEURONTIN) 300 mg capsule, Take 300 mg by mouth three (3) times daily. , Disp: , Rfl:    Date Last Reviewed:  10/4/17   Number of Personal Factors/Comorbidities that affect the Plan of Care: 1-2: MODERATE COMPLEXITY   EXAMINATION:   Observation/Orthostatic Postural Assessment:   Performed on 3/7/17        Pt sits with forward trunk and forward head posture. Pt stands with hip in anterior rotation, forward trunk position, forward head, and rounded shoulders. Sitting she leans to the right. Stairs: pt able to go up stairs one step at a time, very slowly. Pt refused to perform 5 min walking endurance test today. Body Structures Involved:  1. Joints  2. Muscles Body Functions Affected:  1. Neuromusculoskeletal Activities and Participation Affected:  1. Mobility  2. Self Care  3. Community, Social and [place]   Number of elements (examined above) that affect the Plan of Care: 3: MODERATE COMPLEXITY   CLINICAL PRESENTATION:   Presentation: Evolving clinical presentation with changing clinical characteristics: MODERATE COMPLEXITY   CLINICAL DECISION MAKING:   Outcome Measure: Tool Used: Modified Oswestry Low Back Pain Questionnaire  Score:  Initial: 35/50  3/7/17 Most Recent: 32/50 (Date: -10/4/17- )   Interpretation of Score: Each section is scored on a 0-5 scale, 5 representing the greatest disability. The scores of each section are added together for a total score of 50. Medical Necessity:   · Patient is expected to demonstrate progress in strength and endurance to improve mobility.   Reason for Services/Other Comments:  · Patient continues to require skilled intervention due to decreased mobility and increased pain. Use of outcome tool(s) and clinical judgement create a POC that gives a: Questionable prediction of patient's progress: MODERATE COMPLEXITY            TREATMENT:   (In addition to Assessment/Re-Assessment sessions the following treatments were rendered)  Pre-treatment Symptoms/Complaints:   Pt doing well today. Pain: Initial:     not rated Post Session:  3/10     Aquatic Therapy (45 minutes): Aquatic treatment performed per flow grid for Decreased muscle strength, Decreased endurance, Decreased range of motion, Decreased activity endurance, Decompression, Ease of movement and Low impact and reduced weight bearing activity. Cues provided for posture, gait and ex. Aquatic Exercise Log       Date  10/4/17 Date  10/10/17 Date  10/12/17 Date  10/17/17 Date  10/24/17   Activity/ Exercise        Walking forward 6 6 6 6 6   Walking backward 6 6 6 6 6   Walking sideways 6 6 6 6 6     Marching 6 6 6 6 6     Goose Step 6 6 6 6 6     Tip toes 3 3 3 3 3     Heels 3 3 3 3 3     Lunges Long step 6 Long step 6 Long step 6 Long step 6 Long step 6   Side step squats        LE Exercises 4.5'' 3.5' with ring on foot 3. 5' with ring on foot 3. 5' without restistance 3. 5' without resistance     Hip Flex/Ext 15 15 15 15 15     Hip Abd/Add 15 15 15 15 15     Hip IR/ER          Calf raises 15         Knee Flex 15 15 15 15 15     Squats          Leg Circles 15/15 15/15 15/15 15/15 15/15     Step Ups 15 15 15 15 15   UE Exercises          butterflies          Shoulder rolls          Pull Down          Bicep/Tricep        Rows/Press outs         Chi Positions          Trunk Rotation        Deep H2O/ Noodles  7' with noodle 7' with noodle  7' with noodle 7' with blue rings     Stabilization          Arms only          Legs only        Cross   Country  2 min 2 min 2 min 2 min     Scissors  2 min 2 min 2 min 2 min     Jog    2 min 2 min 2 min 2 min   Lower abdominal   work   2 min 2 min DKTC 2 min DKTC 2 min     Cardio          Jogging        Lap   Swimming          Stretches          Hamstrings          Heelcords          Piriformis          Neck          Treatment/Session Assessment:    · Response to Treatment:  Pt did well with aquatic exercises. · Compliance with Program/Exercises: Will assess as treatment progresses. · Recommendations/Intent for next treatment session: aquatic therapy for posture, mobility, strengthening.   Total Treatment Duration: 45 minutes  PT Patient Time In/Time Out  Time In: 1115  Time Out: MALGORZATA Stewart

## 2017-10-26 ENCOUNTER — APPOINTMENT (OUTPATIENT)
Dept: PHYSICAL THERAPY | Age: 57
End: 2017-10-26
Payer: COMMERCIAL

## 2017-10-31 ENCOUNTER — HOSPITAL ENCOUNTER (OUTPATIENT)
Dept: PHYSICAL THERAPY | Age: 57
Discharge: HOME | End: 2017-10-31
Payer: COMMERCIAL

## 2017-11-07 ENCOUNTER — HOSPITAL ENCOUNTER (OUTPATIENT)
Dept: PHYSICAL THERAPY | Age: 57
Discharge: HOME | End: 2017-11-07
Payer: COMMERCIAL

## 2017-11-09 ENCOUNTER — HOSPITAL ENCOUNTER (OUTPATIENT)
Dept: PHYSICAL THERAPY | Age: 57
Discharge: HOME | End: 2017-11-09
Payer: COMMERCIAL

## 2017-11-09 PROCEDURE — 97113 AQUATIC THERAPY/EXERCISES: CPT

## 2017-11-09 NOTE — PROGRESS NOTES
Felisa Carlton  : 1960 Therapy Center at [facility] MAIRA  [address], [address] [place] [address],[address], [place] [address].  [phone]   [phone]       OUTPATIENT PHYSICAL THERAPY:Daily Note and Aquatic 2017    ICD-10: Treatment Diagnosis: Difficulty in walking, not elsewhere classified (R26.2); Low back pain (M54.5)  Precautions/Allergies:   Pcn [penicillins] and Aspirin   Fall Risk Score: 2 (? 5 = High Risk)  MD Orders: water therapy with [facility]; eval and treat for 8 weeks MEDICAL/REFERRING DIAGNOSIS:  back pain   DATE OF ONSET: chronic  REFERRING PHYSICIAN: Luis Carlos [address] [place] [address]: sometime in Oct uncertain of day. INITIAL ASSESSMENT:  Ms Chris Coelho presents to PT with complaints of pain, decreased strength, and decreased posture. She has complaints of pain from the back of her head throughout her entire spine and all 4 extremities. She has difficulty with mobility and performing her ADL's. She has done aquatics in the past and it has been helpful. She will benefit from skilled physical therapy to help decrease pain and improve mobility. PROBLEM LIST (Impacting functional limitations):  1. Decreased Strength  2. Decreased ADL/Functional Activities   3. Decreased endurance  4. Increased pain INTERVENTIONS PLANNED:  1. aquatic therapy   2. Therapeutic exercise for ROM, strengthening, flexibility  3. Manual therapy  4. Modalities as needed for pain   TREATMENT PLAN:  Effective Dates: 10-4-17 TO 18. Frequency/Duration: 2 times a week for 3 weeks  GOALS: (Goals have been discussed and agreed upon with patient.)  Short-Term Functional Goals: Time Frame: 6 weeks  1. Pt will report compliance with HEP. ongoing  2. Pt will be able to demonstrate neutral spine with sitting and standing positions. ongoing  3. Pt will perform 5 min walking endurance test.   ongoing  Discharge Goals: Time Frame: 12 weeks  ongoing  1.  Pt will improve 5 min walking endurance test by 100 ft for improved mobility. 2. Pt will negotiate 1 flight of stairs with step over step to get to her bedroom at home. 3. Pt will demonstrate neutral spine in standing for 5 minutes to perform an ADL at home. Rehabilitation Potential For Stated Goals: Fair  Regarding Patricia Limon's therapy, I certify that the treatment plan above will be carried out by a therapist or under their direction. Thank you for this referral,    Eldon Alfaro PTA       Referring Physician Signature: Matt Rater*              Date                      HISTORY:   History of Present Injury/Illness (Reason for Referral):  Pt was injured in 2007 and continues to have symptoms from accident. Pain 8/10. Pain is constant, pain does go down her legs and arms. Numbness in her right hand. Pt reports her pain \"draws her forward\" and she doesn't like that. She would like to have exercises for improved posture. Past Medical History/Comorbidities:   neck surgery 2000 , 2007  Social History/Living Environment:    Pt lives alone. 2 story house with the bedroom on the 2nd floor. 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 Current Medications:       Current Outpatient Prescriptions:     LORazepam (ATIVAN) 1 mg tablet, Take 1 Tab by mouth nightly as needed for Anxiety. Max Daily Amount: 1 mg., Disp: 15 Tab, Rfl: 0    ZOLPIDEM TARTRATE (AMBIEN PO), Take 10 mg by mouth., Disp: , Rfl:     OXYCODONE HCL/ACETAMINOPHEN (PERCOCET PO), Take 10 mg by mouth., Disp: , Rfl:     citalopram (CELEXA) 20 mg tablet, Take 20 mg by mouth daily. , Disp: , Rfl:     pantoprazole (PROTONIX) 40 mg tablet, Take 40 mg by mouth daily. , Disp: , Rfl:     celecoxib (CELEBREX) 200 mg capsule, Take 200 mg by mouth two (2) times a day.  , Disp: , Rfl:     gabapentin (NEURONTIN) 300 mg capsule, Take 300 mg by mouth three (3) times daily.   , Disp: , Rfl:    Date Last Reviewed:  10/4/17   Number of Personal Factors/Comorbidities that affect the Plan of Care: 1-2: MODERATE COMPLEXITY   EXAMINATION:   Observation/Orthostatic Postural Assessment:   Performed on 3/7/17        Pt sits with forward trunk and forward head posture. Pt stands with hip in anterior rotation, forward trunk position, forward head, and rounded shoulders. Sitting she leans to the right. Stairs: pt able to go up stairs one step at a time, very slowly. Pt refused to perform 5 min walking endurance test today. Body Structures Involved:  1. Joints  2. Muscles Body Functions Affected:  1. Neuromusculoskeletal Activities and Participation Affected:  1. Mobility  2. Self Care  3. Community, Social and [place]   Number of elements (examined above) that affect the Plan of Care: 3: MODERATE COMPLEXITY   CLINICAL PRESENTATION:   Presentation: Evolving clinical presentation with changing clinical characteristics: MODERATE COMPLEXITY   CLINICAL DECISION MAKING:   Outcome Measure: Tool Used: Modified Oswestry Low Back Pain Questionnaire  Score:  Initial: 35/50  3/7/17 Most Recent: 32/50 (Date: -10/4/17- )   Interpretation of Score: Each section is scored on a 0-5 scale, 5 representing the greatest disability. The scores of each section are added together for a total score of 50. Medical Necessity:   · Patient is expected to demonstrate progress in strength and endurance to improve mobility. Reason for Services/Other Comments:  · Patient continues to require skilled intervention due to decreased mobility and increased pain. Use of outcome tool(s) and clinical judgement create a POC that gives a: Questionable prediction of patient's progress: MODERATE COMPLEXITY            TREATMENT:   (In addition to Assessment/Re-Assessment sessions the following treatments were rendered)  Pre-treatment Symptoms/Complaints:   Pt doing well.   Talked about the stressors in her life with aging parents. Pain: Initial:     not rated Post Session:  4/10     Aquatic Therapy (45 minutes): Aquatic treatment performed per flow grid for Decreased muscle strength, Decreased endurance, Decreased range of motion, Decreased activity endurance, Decompression, Ease of movement and Low impact and reduced weight bearing activity. Cues provided for posture, gait and ex. Aquatic Exercise Log       Date  10/4/17 Date  10/10/17 Date  10/12/17 Date  10/17/17 Date  10/24/17 Date  11/9/17   Activity/ Exercise         Walking forward 6 6 6 6 6 6   Walking backward 6 6 6 6 6 6   Walking sideways 6 6 6 6 6 6     Marching 6 6 6 6 6 6     Goose Step 6 6 6 6 6 6     Tip toes 3 3 3 3 3 3     Heels 3 3 3 3 3 3     Lunges Long step 6 Long step 6 Long step 6 Long step 6 Long step 6 Long step 6   Side step squats         LE Exercises 4.5'' 3.5' with ring on foot 3. 5' with ring on foot 3. 5' without restistance 3. 5' without resistance 3.5'      Hip Flex/Ext 15 15 15 15 15 15     Hip Abd/Add 15 15 15 15 15 15     Hip IR/ER           Calf raises 15          Knee Flex 15 15 15 15 15 15     Squats           Leg Circles 15/15 15/15 15/15 15/15 15/15 15/15     Step Ups 15 15 15 15 15 15   UE Exercises           butterflies           Shoulder rolls           Pull Down           Bicep/Tricep         Rows/Press outs          Chi Positions           Trunk Rotation         Deep H2O/ Noodles  7' with noodle 7' with noodle  7' with noodle 7' with blue rings 7' with blue rings     Stabilization           Arms only           Legs only         Cross   Country  2 min 2 min 2 min 2 min 3 min     Scissors  2 min 2 min 2 min 2 min 3 min     Jog    2 min 2 min 2 min 2 min 3 min   Lower abdominal   work   2 min 2 min DKTC 2 min DKTC 2 min DTKC 3 min     Cardio           Jogging         Lap   Swimming           Stretches           Hamstrings           Heelcords           Piriformis           Neck             Pt sat in front of jet x 10 minutes at end of session. Treatment/Session Assessment:    · Response to Treatment:   Pt slowly progressing. Will plan to add weights next visit. · Compliance with Program/Exercises: Will assess as treatment progresses. · Recommendations/Intent for next treatment session: aquatic therapy for posture, mobility, strengthening.   Total Treatment Duration: 45 minutes  PT Patient Time In/Time Out  Time In: 1245  Time Out: [address]

## 2018-02-27 NOTE — PROGRESS NOTES
Angelita Lubin  : 1960 Therapy [facility] at [facility] PRASHANT RAO  [address] [facility], [address] [place] [address],[address], [address] [facility]  [phone]   [phone]       OUTPATIENT PHYSICAL THERAPY:Discontinuation Summary    ICD-10: Treatment Diagnosis: Difficulty in walking, not elsewhere classified (R26.2); Low back pain (M54.5)  Precautions/Allergies:   Pcn [penicillins] and Aspirin   Fall Risk Score: 2 (? 5 = High Risk)  MD Orders: water therapy with [address] at [facility]; eval and treat for 8 weeks MEDICAL/REFERRING DIAGNOSIS:  back pain   DATE OF ONSET: chronic  REFERRING PHYSICIAN: Luis Carlos [address] [place] [address]: sometime in Oct uncertain of day. INITIAL ASSESSMENT:  Ms Marcelino Medrano attended 15 visits of PT and did not return after 17. She will be discharged at this time. PROBLEM LIST (Impacting functional limitations):  1. Decreased Strength  2. Decreased ADL/Functional Activities   3. Decreased endurance  4. Increased pain INTERVENTIONS PLANNED:  1. aquatic therapy   2. Therapeutic exercise for ROM, strengthening, flexibility  3. Manual therapy  4. Modalities as needed for pain   TREATMENT PLAN:  Effective Dates: 10-4-17 TO 18. Frequency/Duration: 2 times a week for 3 weeks  GOALS: (Goals have been discussed and agreed upon with patient.)  Short-Term Functional Goals: Time Frame: 6 weeks  1. Pt will report compliance with HEP. ongoing  2. Pt will be able to demonstrate neutral spine with sitting and standing positions. ongoing  3. Pt will perform 5 min walking endurance test.   ongoing  Discharge Goals: Time Frame: 12 weeks  ongoing  1. Pt will improve 5 min walking endurance test by 100 ft for improved mobility. 2. Pt will negotiate 1 flight of stairs with step over step to get to her bedroom at home. 3. Pt will demonstrate neutral spine in standing for 5 minutes to perform an ADL at home.                 TREATMENT:          Aramis Day, PT

## 2018-09-05 ENCOUNTER — HOSPITAL ENCOUNTER (OUTPATIENT)
Dept: SURGERY | Age: 58
Discharge: HOME | End: 2018-09-05

## 2018-09-05 NOTE — PERIOP NOTES
Pt did not arrive for PAT assessment. Spoke with patient, pt states she notified office that she plans to maybe wait later in the year to have surgery done.

## 2018-12-15 ENCOUNTER — HOSPITAL ENCOUNTER (OUTPATIENT)
Age: 58
Setting detail: OBSERVATION
Discharge: HOME | End: 2018-12-17
Attending: EMERGENCY MEDICINE | Admitting: INTERNAL MEDICINE
Payer: COMMERCIAL

## 2018-12-15 ENCOUNTER — APPOINTMENT (OUTPATIENT)
Dept: MRI IMAGING | Age: 58
End: 2018-12-15
Attending: INTERNAL MEDICINE
Payer: COMMERCIAL

## 2018-12-15 ENCOUNTER — APPOINTMENT (OUTPATIENT)
Dept: CT IMAGING | Age: 58
End: 2018-12-15
Attending: EMERGENCY MEDICINE
Payer: COMMERCIAL

## 2018-12-15 DIAGNOSIS — J32.3 SPHENOID SINUSITIS, UNSPECIFIED CHRONICITY: Primary | ICD-10-CM

## 2018-12-15 DIAGNOSIS — R20.0 FACIAL NUMBNESS: ICD-10-CM

## 2018-12-15 DIAGNOSIS — H02.401 PTOSIS OF RIGHT EYELID: ICD-10-CM

## 2018-12-15 PROBLEM — R29.810 FACIAL DROOP: Status: ACTIVE | Noted: 2018-12-15

## 2018-12-15 PROBLEM — J32.9 SINUSITIS: Status: ACTIVE | Noted: 2018-12-15

## 2018-12-15 PROBLEM — H02.409 PTOSIS: Status: ACTIVE | Noted: 2018-12-15

## 2018-12-15 PROBLEM — G89.29 CHRONIC PAIN: Chronic | Status: ACTIVE | Noted: 2018-12-15

## 2018-12-15 LAB
ALBUMIN SERPL-MCNC: 3.9 G/DL (ref 3.5–5)
ALBUMIN/GLOB SERPL: 1 {RATIO}
ALP SERPL-CCNC: 129 U/L (ref 50–130)
ALT SERPL-CCNC: 42 U/L (ref 12–65)
ANION GAP SERPL CALC-SCNC: 9 MMOL/L
AST SERPL-CCNC: 26 U/L (ref 15–37)
BASOPHILS # BLD: 0.1 K/UL (ref 0–0.2)
BASOPHILS NFR BLD: 1 % (ref 0–2)
BILIRUB DIRECT SERPL-MCNC: 0.1 MG/DL
BILIRUB SERPL-MCNC: 0.3 MG/DL (ref 0.2–1.1)
BUN SERPL-MCNC: 10 MG/DL (ref 6–23)
CALCIUM SERPL-MCNC: 8.9 MG/DL (ref 8.3–10.4)
CHLORIDE SERPL-SCNC: 104 MMOL/L (ref 98–107)
CO2 SERPL-SCNC: 27 MMOL/L (ref 21–32)
CREAT SERPL-MCNC: 0.79 MG/DL (ref 0.6–1)
DIFFERENTIAL METHOD BLD: ABNORMAL
EOSINOPHIL # BLD: 0.2 K/UL (ref 0–0.8)
EOSINOPHIL NFR BLD: 2 % (ref 0.5–7.8)
ERYTHROCYTE [DISTWIDTH] IN BLOOD BY AUTOMATED COUNT: 14.6 % (ref 11.9–14.6)
ERYTHROCYTE [SEDIMENTATION RATE] IN BLOOD: 37 MM/HR (ref 0–30)
GLOBULIN SER CALC-MCNC: 4 G/DL (ref 2.3–3.5)
GLUCOSE SERPL-MCNC: 96 MG/DL (ref 65–100)
HCT VFR BLD AUTO: 37.4 % (ref 35.8–46.3)
HGB BLD-MCNC: 11.6 G/DL (ref 11.7–15.4)
IMM GRANULOCYTES # BLD: 0 K/UL (ref 0–0.5)
IMM GRANULOCYTES NFR BLD AUTO: 0 % (ref 0–5)
LYMPHOCYTES # BLD: 3.5 K/UL (ref 0.5–4.6)
LYMPHOCYTES NFR BLD: 41 % (ref 13–44)
MCH RBC QN AUTO: 24.7 PG (ref 26.1–32.9)
MCHC RBC AUTO-ENTMCNC: 31 G/DL (ref 31.4–35)
MCV RBC AUTO: 79.6 FL (ref 79.6–97.8)
MONOCYTES # BLD: 0.6 K/UL (ref 0.1–1.3)
MONOCYTES NFR BLD: 7 % (ref 4–12)
NEUTS SEG # BLD: 4.1 K/UL (ref 1.7–8.2)
NEUTS SEG NFR BLD: 49 % (ref 43–78)
NRBC # BLD: 0 K/UL (ref 0–0.2)
PLATELET # BLD AUTO: 513 K/UL (ref 150–450)
PMV BLD AUTO: 8.8 FL (ref 9.4–12.3)
POTASSIUM SERPL-SCNC: 4.1 MMOL/L (ref 3.5–5.1)
PROT SERPL-MCNC: 7.9 G/DL
RBC # BLD AUTO: 4.7 M/UL (ref 4.05–5.2)
SODIUM SERPL-SCNC: 140 MMOL/L (ref 136–145)
WBC # BLD AUTO: 8.4 K/UL (ref 4.3–11.1)

## 2018-12-15 PROCEDURE — 74011250636 HC RX REV CODE- 250/636: Performed by: EMERGENCY MEDICINE

## 2018-12-15 PROCEDURE — 96372 THER/PROPH/DIAG INJ SC/IM: CPT

## 2018-12-15 PROCEDURE — 85652 RBC SED RATE AUTOMATED: CPT

## 2018-12-15 PROCEDURE — 93005 ELECTROCARDIOGRAM TRACING: CPT | Performed by: INTERNAL MEDICINE

## 2018-12-15 PROCEDURE — 70450 CT HEAD/BRAIN W/O DYE: CPT

## 2018-12-15 PROCEDURE — 85025 COMPLETE CBC W/AUTO DIFF WBC: CPT

## 2018-12-15 PROCEDURE — 80076 HEPATIC FUNCTION PANEL: CPT

## 2018-12-15 PROCEDURE — 74011250636 HC RX REV CODE- 250/636: Performed by: INTERNAL MEDICINE

## 2018-12-15 PROCEDURE — 74011000250 HC RX REV CODE- 250: Performed by: EMERGENCY MEDICINE

## 2018-12-15 PROCEDURE — 74011250637 HC RX REV CODE- 250/637: Performed by: INTERNAL MEDICINE

## 2018-12-15 PROCEDURE — 99218 HC RM OBSERVATION: CPT

## 2018-12-15 PROCEDURE — 96365 THER/PROPH/DIAG IV INF INIT: CPT | Performed by: EMERGENCY MEDICINE

## 2018-12-15 PROCEDURE — 70553 MRI BRAIN STEM W/O & W/DYE: CPT

## 2018-12-15 PROCEDURE — 96376 TX/PRO/DX INJ SAME DRUG ADON: CPT

## 2018-12-15 PROCEDURE — A9575 INJ GADOTERATE MEGLUMI 0.1ML: HCPCS | Performed by: INTERNAL MEDICINE

## 2018-12-15 PROCEDURE — 99283 EMERGENCY DEPT VISIT LOW MDM: CPT | Performed by: EMERGENCY MEDICINE

## 2018-12-15 PROCEDURE — 80048 BASIC METABOLIC PNL TOTAL CA: CPT

## 2018-12-15 PROCEDURE — 96375 TX/PRO/DX INJ NEW DRUG ADDON: CPT | Performed by: EMERGENCY MEDICINE

## 2018-12-15 RX ORDER — CLINDAMYCIN PHOSPHATE 600 MG/50ML
600 INJECTION INTRAVENOUS
Status: COMPLETED | OUTPATIENT
Start: 2018-12-15 | End: 2018-12-15

## 2018-12-15 RX ORDER — NALOXONE HYDROCHLORIDE 0.4 MG/ML
0.4 INJECTION, SOLUTION INTRAMUSCULAR; INTRAVENOUS; SUBCUTANEOUS AS NEEDED
Status: DISCONTINUED | OUTPATIENT
Start: 2018-12-15 | End: 2018-12-17 | Stop reason: HOSPADM

## 2018-12-15 RX ORDER — CELECOXIB 200 MG/1
200 CAPSULE ORAL 2 TIMES DAILY
Status: DISCONTINUED | OUTPATIENT
Start: 2018-12-15 | End: 2018-12-17 | Stop reason: HOSPADM

## 2018-12-15 RX ORDER — MORPHINE SULFATE 2 MG/ML
2 INJECTION, SOLUTION INTRAMUSCULAR; INTRAVENOUS
Status: DISCONTINUED | OUTPATIENT
Start: 2018-12-15 | End: 2018-12-17 | Stop reason: HOSPADM

## 2018-12-15 RX ORDER — CLINDAMYCIN PHOSPHATE 600 MG/50ML
600 INJECTION INTRAVENOUS
Status: DISCONTINUED | OUTPATIENT
Start: 2018-12-15 | End: 2018-12-15 | Stop reason: SDUPTHER

## 2018-12-15 RX ORDER — SODIUM CHLORIDE 0.9 % (FLUSH) 0.9 %
5-10 SYRINGE (ML) INJECTION AS NEEDED
Status: DISCONTINUED | OUTPATIENT
Start: 2018-12-15 | End: 2018-12-17 | Stop reason: HOSPADM

## 2018-12-15 RX ORDER — CITALOPRAM 20 MG/1
20 TABLET, FILM COATED ORAL DAILY
Status: DISCONTINUED | OUTPATIENT
Start: 2018-12-16 | End: 2018-12-17 | Stop reason: HOSPADM

## 2018-12-15 RX ORDER — SODIUM CHLORIDE 0.9 % (FLUSH) 0.9 %
5-10 SYRINGE (ML) INJECTION EVERY 8 HOURS
Status: DISCONTINUED | OUTPATIENT
Start: 2018-12-15 | End: 2018-12-17 | Stop reason: HOSPADM

## 2018-12-15 RX ORDER — ONDANSETRON 2 MG/ML
4 INJECTION INTRAMUSCULAR; INTRAVENOUS
Status: COMPLETED | OUTPATIENT
Start: 2018-12-15 | End: 2018-12-15

## 2018-12-15 RX ORDER — TETRACAINE HYDROCHLORIDE 5 MG/ML
1 SOLUTION OPHTHALMIC
Status: COMPLETED | OUTPATIENT
Start: 2018-12-15 | End: 2018-12-15

## 2018-12-15 RX ORDER — SODIUM CHLORIDE 0.9 % (FLUSH) 0.9 %
10 SYRINGE (ML) INJECTION
Status: COMPLETED | OUTPATIENT
Start: 2018-12-15 | End: 2018-12-15

## 2018-12-15 RX ORDER — ONDANSETRON 2 MG/ML
4 INJECTION INTRAMUSCULAR; INTRAVENOUS
Status: DISCONTINUED | OUTPATIENT
Start: 2018-12-15 | End: 2018-12-17 | Stop reason: HOSPADM

## 2018-12-15 RX ORDER — LORAZEPAM 1 MG/1
1 TABLET ORAL ONCE
Status: COMPLETED | OUTPATIENT
Start: 2018-12-15 | End: 2018-12-15

## 2018-12-15 RX ORDER — MORPHINE SULFATE 4 MG/ML
4 INJECTION INTRAVENOUS
Status: COMPLETED | OUTPATIENT
Start: 2018-12-15 | End: 2018-12-15

## 2018-12-15 RX ORDER — DOXYCYCLINE 100 MG/1
100 CAPSULE ORAL EVERY 12 HOURS
Status: DISCONTINUED | OUTPATIENT
Start: 2018-12-15 | End: 2018-12-17 | Stop reason: HOSPADM

## 2018-12-15 RX ORDER — GADOTERATE MEGLUMINE 376.9 MG/ML
19 INJECTION INTRAVENOUS
Status: COMPLETED | OUTPATIENT
Start: 2018-12-15 | End: 2018-12-15

## 2018-12-15 RX ORDER — HEPARIN SODIUM 5000 [USP'U]/ML
5000 INJECTION, SOLUTION INTRAVENOUS; SUBCUTANEOUS EVERY 8 HOURS
Status: DISCONTINUED | OUTPATIENT
Start: 2018-12-15 | End: 2018-12-17 | Stop reason: HOSPADM

## 2018-12-15 RX ORDER — ACETAMINOPHEN 325 MG/1
650 TABLET ORAL
Status: DISCONTINUED | OUTPATIENT
Start: 2018-12-15 | End: 2018-12-17 | Stop reason: HOSPADM

## 2018-12-15 RX ORDER — SODIUM CHLORIDE 0.9 % (FLUSH) 0.9 %
5-10 SYRINGE (ML) INJECTION EVERY 8 HOURS
Status: DISCONTINUED | OUTPATIENT
Start: 2018-12-15 | End: 2018-12-16

## 2018-12-15 RX ORDER — GABAPENTIN 300 MG/1
300 CAPSULE ORAL 3 TIMES DAILY
Status: DISCONTINUED | OUTPATIENT
Start: 2018-12-15 | End: 2018-12-17 | Stop reason: HOSPADM

## 2018-12-15 RX ORDER — PANTOPRAZOLE SODIUM 40 MG/1
40 TABLET, DELAYED RELEASE ORAL
Status: DISCONTINUED | OUTPATIENT
Start: 2018-12-16 | End: 2018-12-17 | Stop reason: HOSPADM

## 2018-12-15 RX ORDER — ZOLPIDEM TARTRATE 5 MG/1
5 TABLET ORAL
Status: DISCONTINUED | OUTPATIENT
Start: 2018-12-15 | End: 2018-12-17 | Stop reason: HOSPADM

## 2018-12-15 RX ORDER — HYDROCODONE BITARTRATE AND ACETAMINOPHEN 7.5; 325 MG/1; MG/1
1 TABLET ORAL
Status: DISCONTINUED | OUTPATIENT
Start: 2018-12-15 | End: 2018-12-17 | Stop reason: HOSPADM

## 2018-12-15 RX ADMIN — ZOLPIDEM TARTRATE 5 MG: 5 TABLET ORAL at 22:58

## 2018-12-15 RX ADMIN — Medication 10 ML: at 18:02

## 2018-12-15 RX ADMIN — MORPHINE SULFATE 4 MG: 4 INJECTION INTRAVENOUS at 15:36

## 2018-12-15 RX ADMIN — Medication 5 ML: at 22:58

## 2018-12-15 RX ADMIN — LORAZEPAM 1 MG: 1 TABLET ORAL at 16:53

## 2018-12-15 RX ADMIN — ONDANSETRON 4 MG: 2 INJECTION INTRAMUSCULAR; INTRAVENOUS at 15:36

## 2018-12-15 RX ADMIN — GABAPENTIN 300 MG: 300 CAPSULE ORAL at 22:58

## 2018-12-15 RX ADMIN — GADOTERATE MEGLUMINE 19 ML: 376.9 INJECTION INTRAVENOUS at 18:02

## 2018-12-15 RX ADMIN — CELECOXIB 200 MG: 200 CAPSULE ORAL at 20:48

## 2018-12-15 RX ADMIN — SODIUM CHLORIDE 1000 ML: 900 INJECTION, SOLUTION INTRAVENOUS at 13:44

## 2018-12-15 RX ADMIN — CLINDAMYCIN IN 5 PERCENT DEXTROSE 600 MG: 12 INJECTION, SOLUTION INTRAVENOUS at 15:36

## 2018-12-15 RX ADMIN — TETRACAINE HYDROCHLORIDE 1 DROP: 5 SOLUTION OPHTHALMIC at 14:09

## 2018-12-15 RX ADMIN — HYDROCODONE BITARTRATE AND ACETAMINOPHEN 1 TABLET: 7.5; 325 TABLET ORAL at 21:18

## 2018-12-15 RX ADMIN — MORPHINE SULFATE 2 MG: 2 INJECTION, SOLUTION INTRAMUSCULAR; INTRAVENOUS at 19:52

## 2018-12-15 RX ADMIN — HEPARIN SODIUM 5000 UNITS: 5000 INJECTION INTRAVENOUS; SUBCUTANEOUS at 17:09

## 2018-12-15 RX ADMIN — DOXYCYCLINE HYCLATE 100 MG: 100 CAPSULE ORAL at 20:48

## 2018-12-15 NOTE — ED PROVIDER NOTES
56-year-old [facility] [employer] female presents with sharp stabbing pain behind her right eye for the past 2 weeks. She also reports some hoarseness. Primary care has treated her with 2 rounds of antibiotics for presumed sinusitis. Patient states she has not had any cough congestion or fevers. Over the past few days she has noticed drooping to her right eyelid. No numbness or weakness in her extremities. No vision changes. The history is provided by the patient. Eye Pain    Associated symptoms include photophobia and pain. Pertinent negatives include no nausea, no vomiting and no fever. Past Medical History:   Diagnosis Date    Arthritis     Gastrointestinal disorder     diverticulitis-gerd    Other ill-defined conditions(799.89)     Chronic back,neck,hip pain.     Other unknown and unspecified cause of morbidity or mortality     bells palsy    Psychiatric disorder     anxiety       Past Surgical History:   Procedure Laterality Date    HX CHOLECYSTECTOMY      HX HYSTERECTOMY      HX ORTHOPAEDIC      neck x2    NEUROLOGICAL PROCEDURE UNLISTED      cervical fusion         Family History:   Problem Relation Age of Onset    Breast Cancer Paternal Aunt 80       Social History     Socioeconomic History    Marital status:      Spouse name: Not on file    Number of children: Not on file    Years of education: Not on file    Highest education level: Not on file   Social Needs    Financial resource strain: Not on file    Food insecurity - worry: Not on file    Food insecurity - inability: Not on file   [employer] needs - medical: Not on file   [employer] needs - non-medical: Not on file   Occupational History    Not on file   Tobacco Use    Smoking status: Never Smoker   Substance and Sexual Activity    Alcohol use: No    Drug use: No    Sexual activity: Not Currently   Other Topics Concern    Not on file   Social History Narrative    Not on file         ALLERGIES: Pcn [penicillins] and Aspirin    Review of Systems   Constitutional: Negative for fever. HENT: Negative for congestion. Eyes: Positive for photophobia and pain. Respiratory: Negative for cough and shortness of breath. Cardiovascular: Negative for chest pain. Gastrointestinal: Negative for abdominal pain, nausea and vomiting. Genitourinary: Negative for dysuria. Musculoskeletal: Negative for back pain and neck pain. Skin: Negative for rash. Neurological: Positive for headaches. Vitals:    12/15/18 1325   BP: 133/84   Pulse: 89   Resp: 16   Temp: 98.4 °F (36.9 °C)   SpO2: 98%   Weight: 93 kg (205 lb)   Height: 5' 6\" (1.676 m)            Physical Exam   Constitutional: She is oriented to person, place, and time. She appears well-developed and well-nourished. No distress. HENT:   Head: Normocephalic and atraumatic. Mouth/Throat: Oropharynx is clear and moist.   Eyes: Conjunctivae and EOM are normal. Pupils are equal, round, and reactive to light. Right eye exhibits no discharge. No scleral icterus. Slight ptosis right side. Extraocular movements are intact however patient has pain to her right eye with movement. Intraocular pressure right eye 22   Neck: Normal range of motion. Neck supple. Cardiovascular: Normal rate and regular rhythm. Pulmonary/Chest: Effort normal and breath sounds normal.   Abdominal: Soft. There is no tenderness. Musculoskeletal: Normal range of motion. She exhibits no edema. Neurological: She is alert and oriented to person, place, and time. She exhibits normal muscle tone. Coordination normal.   Subjective diminished sensation to the entire right side of face   Skin: Skin is warm and dry. Psychiatric: She has a normal mood and affect. Her behavior is normal.   Nursing note and vitals reviewed. MDM  Number of Diagnoses or Management Options  Diagnosis management comments: labwork unremarkable.  CT head shows no acute intracranial abnormality but does show right sphenoid sinusitis. Patient does have decreased sensation to the right side of her face as well as ptosis. She does not have any abnormal extraocular movements. Findings concerning for possible cavernous sinus thrombosis. We'll start antibiotics and discussed with hospitalist for admission for further workup.        Amount and/or Complexity of Data Reviewed  Clinical lab tests: ordered and reviewed  Tests in the radiology section of CPT®: ordered and reviewed  Tests in the medicine section of CPT®: ordered and reviewed  Discuss the patient with other providers: yes  Independent visualization of images, tracings, or specimens: yes    Risk of Complications, Morbidity, and/or Mortality  Presenting problems: moderate  Diagnostic procedures: moderate  Management options: moderate           Procedures

## 2018-12-15 NOTE — PROGRESS NOTES
TRANSFER - IN REPORT:    Verbal report received from [facility] on Andrew Ruano  being received from ER for routine progression of care      Report consisted of patients Situation, Background, Assessment and   Recommendations(SBAR). Information from the following report(s) SBAR was reviewed with the receiving nurse. Opportunity for questions and clarification was provided. Assessment completed upon patients arrival to unit and care assumed.

## 2018-12-15 NOTE — H&P
Hospitalist H&P Note     Admit Date:  12/15/2018  1:26 PM   Name:  Amy Vanessa   Age:  63 y.o.  :  1960   MRN:  [id number]   PCP:  Estefani Awad NP  Treatment Team: Attending Provider: Dhruv Gan MD    HPI:     CC:  Right eye pain       Ms. Sera Sanchez is a 64 yo female with PMH of chronic neck pain followed by pain managment who is evaluated with complaints of right eye pain for several weeks with head pressure. She has been treated with several rounds of antibiotics per her PCP without improvement. She has light / noise sensitivity, no prior migraines. Has head pressure and hoarseness. She was sent to the ED today after being seen by PCP who noted right eye drooping. She has prior bells palsy affecting her right face that resolved. CT head shows right sphenoid sinusitis. 10 systems reviewed and negative except as noted in HPI. - has neck pain, paresthesia, sweats and fever, ear pain, chest pain, dyspnea and anorexia       Past Medical History:   Diagnosis Date    Arthritis     Gastrointestinal disorder     diverticulitis-gerd    Other ill-defined conditions(799.89)     Chronic back,neck,hip pain.  Other unknown and unspecified cause of morbidity or mortality     bells palsy    Psychiatric disorder     anxiety      Past Surgical History:   Procedure Laterality Date    HX CHOLECYSTECTOMY      HX HYSTERECTOMY      HX ORTHOPAEDIC      neck x2    NEUROLOGICAL PROCEDURE UNLISTED      cervical fusion      Allergies   Allergen Reactions    Pcn [Penicillins] Anaphylaxis    Aspirin Other (comments)      Social History     Tobacco Use    Smoking status: Never Smoker   Substance Use Topics    Alcohol use: No      Family History   Problem Relation Age of Onset    Breast Cancer Paternal Aunt 80        There is no immunization history on file for this patient. PTA Medications:  Prior to Admission Medications   Prescriptions Last Dose Informant Patient Reported? Taking?    LORazepam (ATIVAN) 1 mg tablet   No No   Sig: Take 1 Tab by mouth nightly as needed for Anxiety. Max Daily Amount: 1 mg. OXYCODONE HCL/ACETAMINOPHEN (PERCOCET PO)   Yes No   Sig: Take 10 mg by mouth. ZOLPIDEM TARTRATE (AMBIEN PO)   Yes No   Sig: Take 10 mg by mouth. celecoxib (CELEBREX) 200 mg capsule   Yes No   Sig: Take 200 mg by mouth two (2) times a day. citalopram (CELEXA) 20 mg tablet   Yes No   Sig: Take 20 mg by mouth daily. gabapentin (NEURONTIN) 300 mg capsule   Yes No   Sig: Take 300 mg by mouth three (3) times daily. pantoprazole (PROTONIX) 40 mg tablet   Yes No   Sig: Take 40 mg by mouth daily. Facility-Administered Medications: None       Objective:     Patient Vitals for the past 24 hrs:   Temp Pulse Resp BP SpO2   12/15/18 1608 98.5 °F (36.9 °C) 80 16 125/80 98 %   12/15/18 1325 98.4 °F (36.9 °C) 89 16 133/84 98 %     Oxygen Therapy  O2 Sat (%): 98 % (12/15/18 1608)  O2 Device: Room air (12/15/18 1608)  No intake or output data in the 24 hours ending 12/15/18 1630    Physical Exam:  General:    Alert. No distress  Eyes:   Normal sclera. Extraocular movements intact. PERRLA  ENT:  Normocephalic, atraumatic. Moist mucous membranes, right posterior soft palate droop, TM / ear canals are clear   CV:   RRR. No m/r/g. . No edema  Lungs:  CTAB. No wheezing, rhonchi, or rales. Abdomen: Soft, nontender, nondistended. Present BS  Extremities: Warm and dry. .  Neurologic: Right ptosis and mild right lower facial droop  Skin:     No rashes or jaundice. Normal coloration, large posterior upper back lipoma  Psych:  Normal mood and affect. I reviewed the labs, imaging, EKGs, telemetry, and other studies done this admission.       Data Review:   Recent Results (from the past 24 hour(s))   METABOLIC PANEL, BASIC    Collection Time: 12/15/18  2:46 PM   Result Value Ref Range    Sodium 140 136 - 145 mmol/L    Potassium 4.1 3.5 - 5.1 mmol/L    Chloride 104 98 - 107 mmol/L    CO2 27 21 - 32 mmol/L Anion gap 9 mmol/L    Glucose 96 65 - 100 mg/dL    BUN 10 6 - 23 MG/DL    Creatinine 0.79 0.6 - 1.0 MG/DL    GFR est AA >60 >60 ml/min/1.73m2    GFR est non-AA >60 ml/min/1.73m2    Calcium 8.9 8.3 - 10.4 MG/DL   CBC WITH AUTOMATED DIFF    Collection Time: 12/15/18  2:46 PM   Result Value Ref Range    WBC 8.4 4.3 - 11.1 K/uL    RBC 4.70 4.05 - 5.2 M/uL    HGB 11.6 (L) 11.7 - 15.4 g/dL    HCT 37.4 35.8 - 46.3 %    MCV 79.6 79.6 - 97.8 FL    MCH 24.7 (L) 26.1 - 32.9 PG    MCHC 31.0 (L) 31.4 - 35.0 g/dL    RDW 14.6 11.9 - 14.6 %    PLATELET 228 (H) 737 - 450 K/uL    MPV 8.8 (L) 9.4 - 12.3 FL    ABSOLUTE NRBC 0.00 0.0 - 0.2 K/uL    DF AUTOMATED      NEUTROPHILS 49 43 - 78 %    LYMPHOCYTES 41 13 - 44 %    MONOCYTES 7 4.0 - 12.0 %    EOSINOPHILS 2 0.5 - 7.8 %    BASOPHILS 1 0.0 - 2.0 %    IMMATURE GRANULOCYTES 0 0.0 - 5.0 %    ABS. NEUTROPHILS 4.1 1.7 - 8.2 K/UL    ABS. LYMPHOCYTES 3.5 0.5 - 4.6 K/UL    ABS. MONOCYTES 0.6 0.1 - 1.3 K/UL    ABS. EOSINOPHILS 0.2 0.0 - 0.8 K/UL    ABS. BASOPHILS 0.1 0.0 - 0.2 K/UL    ABS. IMM. GRANS. 0.0 0.0 - 0.5 K/UL   HEPATIC FUNCTION PANEL    Collection Time: 12/15/18  2:46 PM   Result Value Ref Range    Protein, total 7.9 g/dL    Albumin 3.9 3.5 - 5.0 g/dL    Globulin 4.0 (H) 2.3 - 3.5 g/dL    A-G Ratio 1.0      Bilirubin, total 0.3 0.2 - 1.1 MG/DL    Bilirubin, direct 0.1 <0.4 MG/DL    Alk. phosphatase 129 50 - 130 U/L    AST (SGOT) 26 15 - 37 U/L    ALT (SGPT) 42 12 - 65 U/L       All Micro Results     None          Other Studies:  Ct Head Wo Cont    Result Date: 12/15/2018  Noncontrast head CT Clinical Indication: 3 weeks of persisting generalized moderate to severe headache. Technique: Noncontrast axial images were obtained through the brain.  All CT scans at this location are performed using dose modulation techniques as appropriate including the following: Automated exposure control, adjustment of the MA and/or kV according to patient's size, or use of iterative reconstruction technique. Comparison: 8/27/2015 and CT 8/7/2015 MRI Findings: There is no acute intracranial hemorrhage, hydrocephalus, intra-axial mass, or mass-effect. There is no CT evidence of acute large artery territorial infarction or abnormal extra-axial fluid collection. There is a right sphenoid sinus air-fluid level which may indicate acute infection. The mastoid air cells and paranasal sinuses are otherwise clear where imaged. No displaced skull fractures are present. Impression: No acute intracranial abnormality. Right sphenoid sinusitis. Assessment and Plan:     Hospital Problems as of 12/15/2018 Never Reviewed          Codes Class Noted - Resolved POA    * (Principal) Facial droop ICD-10-CM: R29.810  ICD-9-CM: 781.94  12/15/2018 - Present Yes        Ptosis ICD-10-CM: H02.409  ICD-9-CM: 374.30  12/15/2018 - Present Yes        Chronic pain (Chronic) ICD-10-CM: G89.29  ICD-9-CM: 338.29  12/15/2018 - Present Yes        Sinusitis ICD-10-CM: J32.9  ICD-9-CM: 473.9  12/15/2018 - Present Yes              · Right facial droop/ ptosis: admit to observation remote tele for MRI brain with contrast and neuro consult  · Sinusitis: start oral doxycycline   · chronic pain: prn norco, continue celebrex/neurontin  · Depression: celexa   · Chest pain: intermittent, check EKG    Discharge planning:   To home once stable  DVT ppx: heparin  Code status:  Full  Estimated LOS:  Greater than 2 midnights  Risk:  high  Care plan: armida [place] [phone]  Signed:  Albin Bashir MD

## 2018-12-15 NOTE — ED NOTES
TRANSFER - OUT REPORT:    Verbal report given to Cata Ramirez RN (name) on [address] [facility] [facility]  being transferred to [facility] (unit) for routine progression of care       Report consisted of patients Situation, Background, Assessment and   Recommendations(SBAR). Information from the following report(s) SBAR, ED Summary, Procedure Summary, Intake/Output, MAR and Recent Results was reviewed with the receiving nurse. Lines:   Peripheral IV 12/15/18 Right Antecubital (Active)   Site Assessment Clean, dry, & intact 12/15/2018  3:07 PM   Phlebitis Assessment 0 12/15/2018  3:07 PM   Infiltration Assessment 0 12/15/2018  3:07 PM   Dressing Status Clean, dry, & intact 12/15/2018  3:07 PM   Dressing Type Transparent 12/15/2018  3:07 PM        Opportunity for questions and clarification was provided.       Patient transported with:   [employer]

## 2018-12-15 NOTE — PROGRESS NOTES
Pt again given applesauce slight delay but feels like swallow is better and no coughing noted. Swallowing liquids without difficulty. Pt really wants to eat supper and is tolerating her supper without  coughing. States sometimes she can have trouble swallowing when she has reflux.

## 2018-12-15 NOTE — PROGRESS NOTES
Pt was asked to swallow water to test swallow prior to taking Ativan tablet. Swallowed without difficulty noted. However, when she swallowed the tablet and water was noted to cough. Given a spoonful of applesauce and was able to swallow  butdelay noted with difficulty swallowing. Another sip of water given and swallowed with less difficulty but then throat clearing noted.

## 2018-12-16 ENCOUNTER — APPOINTMENT (OUTPATIENT)
Dept: MRI IMAGING | Age: 58
End: 2018-12-16
Attending: INTERNAL MEDICINE
Payer: COMMERCIAL

## 2018-12-16 LAB
ATRIAL RATE: 84 BPM
CALCULATED P AXIS, ECG09: 49 DEGREES
CALCULATED R AXIS, ECG10: -7 DEGREES
CALCULATED T AXIS, ECG11: 3 DEGREES
CHOLEST SERPL-MCNC: 159 MG/DL
DIAGNOSIS, 93000: NORMAL
HDLC SERPL-MCNC: 46 MG/DL (ref 40–60)
HDLC SERPL: 3.5 {RATIO}
LDLC SERPL CALC-MCNC: 78 MG/DL
LIPID PROFILE,FLP: ABNORMAL
P-R INTERVAL, ECG05: 156 MS
Q-T INTERVAL, ECG07: 382 MS
QRS DURATION, ECG06: 78 MS
QTC CALCULATION (BEZET), ECG08: 451 MS
TRIGL SERPL-MCNC: 175 MG/DL (ref 35–150)
VENTRICULAR RATE, ECG03: 84 BPM
VLDLC SERPL CALC-MCNC: 35 MG/DL (ref 6–23)

## 2018-12-16 PROCEDURE — 74011250637 HC RX REV CODE- 250/637: Performed by: INTERNAL MEDICINE

## 2018-12-16 PROCEDURE — 99218 HC RM OBSERVATION: CPT

## 2018-12-16 PROCEDURE — 74011636637 HC RX REV CODE- 636/637: Performed by: INTERNAL MEDICINE

## 2018-12-16 PROCEDURE — 96376 TX/PRO/DX INJ SAME DRUG ADON: CPT

## 2018-12-16 PROCEDURE — 74011250636 HC RX REV CODE- 250/636: Performed by: INTERNAL MEDICINE

## 2018-12-16 PROCEDURE — 70544 MR ANGIOGRAPHY HEAD W/O DYE: CPT

## 2018-12-16 PROCEDURE — A9575 INJ GADOTERATE MEGLUMI 0.1ML: HCPCS | Performed by: INTERNAL MEDICINE

## 2018-12-16 PROCEDURE — 74011000258 HC RX REV CODE- 258: Performed by: INTERNAL MEDICINE

## 2018-12-16 PROCEDURE — 80061 LIPID PANEL: CPT

## 2018-12-16 PROCEDURE — 70548 MR ANGIOGRAPHY NECK W/DYE: CPT

## 2018-12-16 PROCEDURE — 36415 COLL VENOUS BLD VENIPUNCTURE: CPT

## 2018-12-16 PROCEDURE — 96372 THER/PROPH/DIAG INJ SC/IM: CPT

## 2018-12-16 RX ORDER — GADOTERATE MEGLUMINE 376.9 MG/ML
20 INJECTION INTRAVENOUS
Status: COMPLETED | OUTPATIENT
Start: 2018-12-16 | End: 2018-12-16

## 2018-12-16 RX ORDER — PREDNISONE 20 MG/1
40 TABLET ORAL
Status: DISCONTINUED | OUTPATIENT
Start: 2018-12-16 | End: 2018-12-17 | Stop reason: HOSPADM

## 2018-12-16 RX ORDER — FLUCONAZOLE 100 MG/1
150 TABLET ORAL
Status: COMPLETED | OUTPATIENT
Start: 2018-12-16 | End: 2018-12-16

## 2018-12-16 RX ORDER — GUAIFENESIN 600 MG/1
600 TABLET, EXTENDED RELEASE ORAL EVERY 12 HOURS
Status: DISCONTINUED | OUTPATIENT
Start: 2018-12-16 | End: 2018-12-17 | Stop reason: HOSPADM

## 2018-12-16 RX ORDER — CYCLOBENZAPRINE HCL 10 MG
5 TABLET ORAL
Status: DISCONTINUED | OUTPATIENT
Start: 2018-12-16 | End: 2018-12-17 | Stop reason: HOSPADM

## 2018-12-16 RX ADMIN — GABAPENTIN 300 MG: 300 CAPSULE ORAL at 09:03

## 2018-12-16 RX ADMIN — DOXYCYCLINE HYCLATE 100 MG: 100 CAPSULE ORAL at 09:03

## 2018-12-16 RX ADMIN — CITALOPRAM HYDROBROMIDE 20 MG: 20 TABLET ORAL at 09:03

## 2018-12-16 RX ADMIN — HEPARIN SODIUM 5000 UNITS: 5000 INJECTION INTRAVENOUS; SUBCUTANEOUS at 17:20

## 2018-12-16 RX ADMIN — DOXYCYCLINE HYCLATE 100 MG: 100 CAPSULE ORAL at 20:13

## 2018-12-16 RX ADMIN — HYDROCODONE BITARTRATE AND ACETAMINOPHEN 1 TABLET: 7.5; 325 TABLET ORAL at 09:14

## 2018-12-16 RX ADMIN — CYCLOBENZAPRINE HYDROCHLORIDE 5 MG: 10 TABLET, FILM COATED ORAL at 20:13

## 2018-12-16 RX ADMIN — HEPARIN SODIUM 5000 UNITS: 5000 INJECTION INTRAVENOUS; SUBCUTANEOUS at 00:58

## 2018-12-16 RX ADMIN — FLUCONAZOLE 150 MG: 100 TABLET ORAL at 10:58

## 2018-12-16 RX ADMIN — GUAIFENESIN 600 MG: 600 TABLET, EXTENDED RELEASE ORAL at 15:47

## 2018-12-16 RX ADMIN — PANTOPRAZOLE SODIUM 40 MG: 40 TABLET, DELAYED RELEASE ORAL at 09:03

## 2018-12-16 RX ADMIN — GABAPENTIN 300 MG: 300 CAPSULE ORAL at 15:47

## 2018-12-16 RX ADMIN — GUAIFENESIN 600 MG: 600 TABLET, EXTENDED RELEASE ORAL at 22:14

## 2018-12-16 RX ADMIN — Medication 10 ML: at 22:17

## 2018-12-16 RX ADMIN — GADOTERATE MEGLUMINE 20 ML: 376.9 INJECTION INTRAVENOUS at 12:34

## 2018-12-16 RX ADMIN — SODIUM CHLORIDE 100 ML: 900 INJECTION, SOLUTION INTRAVENOUS at 12:34

## 2018-12-16 RX ADMIN — MORPHINE SULFATE 2 MG: 2 INJECTION, SOLUTION INTRAMUSCULAR; INTRAVENOUS at 13:42

## 2018-12-16 RX ADMIN — HEPARIN SODIUM 5000 UNITS: 5000 INJECTION INTRAVENOUS; SUBCUTANEOUS at 09:02

## 2018-12-16 RX ADMIN — ZOLPIDEM TARTRATE 5 MG: 5 TABLET ORAL at 22:13

## 2018-12-16 RX ADMIN — Medication 5 ML: at 13:43

## 2018-12-16 RX ADMIN — HYDROCODONE BITARTRATE AND ACETAMINOPHEN 1 TABLET: 7.5; 325 TABLET ORAL at 17:23

## 2018-12-16 RX ADMIN — MORPHINE SULFATE 2 MG: 2 INJECTION, SOLUTION INTRAMUSCULAR; INTRAVENOUS at 00:54

## 2018-12-16 RX ADMIN — GABAPENTIN 300 MG: 300 CAPSULE ORAL at 22:13

## 2018-12-16 RX ADMIN — CYCLOBENZAPRINE HYDROCHLORIDE 5 MG: 10 TABLET, FILM COATED ORAL at 10:58

## 2018-12-16 RX ADMIN — CELECOXIB 200 MG: 200 CAPSULE ORAL at 17:20

## 2018-12-16 RX ADMIN — PREDNISONE 40 MG: 20 TABLET ORAL at 15:47

## 2018-12-16 RX ADMIN — CELECOXIB 200 MG: 200 CAPSULE ORAL at 09:03

## 2018-12-16 RX ADMIN — MORPHINE SULFATE 2 MG: 2 INJECTION, SOLUTION INTRAMUSCULAR; INTRAVENOUS at 06:17

## 2018-12-16 RX ADMIN — HYDROCODONE BITARTRATE AND ACETAMINOPHEN 1 TABLET: 7.5; 325 TABLET ORAL at 22:14

## 2018-12-16 NOTE — PROGRESS NOTES
Pt admitted to [facility] 3rd floor. Admission database completed. Admission booklet given and reviewed with patient. Pt oriented to room and bed controls. Pt instructed to use call light for any needs, pt voiced understanding.

## 2018-12-16 NOTE — PROGRESS NOTES
SPEECH PATHOLOGY NOTE:    Screen received via Best Practice Alert from Nursing Assessment. Screen completed and Chart reviewed. Nursing Functional assessment revealed patient has history of bells palsy. Slight right facial drooping noted, speech clear, swallows without difficulty. No speech/swallow intervention currently indicated per chart review. If additional concerns arise, patient may benefit from a ST consult, please order if MD deems patient appropriate for ST services.   Thank you,  Carolyn Bobo MA, CCC-SLP

## 2018-12-16 NOTE — PROGRESS NOTES
Continues to void without difficulty with asst to and from bathroom. Morphine 2 mg given IVP slowly for c/o returned facial and eye pain.

## 2018-12-16 NOTE — PROGRESS NOTES
Problem: Falls - Risk of  Goal: *Absence of Falls  Document Tory Fall Risk and appropriate interventions in the flowsheet.   Outcome: Progressing Towards Goal  Fall Risk Interventions:  Mobility Interventions: Patient to call before getting OOB         Medication Interventions: Patient to call before getting OOB, Teach patient to arise slowly    Elimination Interventions: Call light in reach, Patient to call for help with toileting needs, Toileting schedule/hourly rounds

## 2018-12-16 NOTE — PROGRESS NOTES
A.M assessment complete; pt in bed. Alert & oriented. Resp even & unlabored on room air; lungs clear; HR regular. Abdomen soft with hypoactive bowel sounds. Right eye droopier than the left; states her left eye has been twitching. Pt asking about Flexeril (which she normally takes for back & neck pain) & if she can be started on Diflucan (states whenever she takes antibiotics that she takes Diflucan to prevent yeast infections). Bed low & locked; call light in reach; will continue to monitor.

## 2018-12-16 NOTE — PROGRESS NOTES
Resting quietly, awake, resp even, unlab, skin warm, dry. AP 82, regular, lungs sounds clear. Pt with hx of neck surgery, chronic neck and back pain affecting right side extremities to be weaker than left extremities, and hx of bells palsy. Slight right facial drooping noted, speech clear, swallows without difficulty. Reports having had sinus infection for 3 weeks, unrelieved with outpatient antibiotic treatment with facial and eye pain described as stabbing and pressure. Discussed benefit of keeping head of bed elevated regarding discomfort. Reports having voided earlier without difficulty, and ate supper meal without difficulty. Agrees  to call for asst to be out of bed, rationale given. Assessment noted.

## 2018-12-16 NOTE — PROGRESS NOTES
Hospitalist Progress Note     Admit Date:  12/15/2018  1:26 PM   Name:  Leon Deras   Age:  63 y.o.  :  1960   MRN:  [id number]   PCP:  Jerry Collier NP  Treatment Team: Attending Provider: Jj Estevez MD; Utilization Review: Jorje Duverney, RN    Subjective:       Ms. Yue Lopez is a 64 yo female with PMH of chronic neck pain followed by pain managment who is evaluated with complaints of right eye pain for several weeks with head pressure. She has been treated with several rounds of antibiotics per her PCP without improvement. She has light / noise sensitivity, no prior migraines. Has head pressure and hoarseness. She was sent to the ED today after being seen by PCP who noted right eye drooping. She has prior bells palsy affecting her right face that resolved. CT head shows right sphenoid sinusitis. she was admitted for TIA / CVA workup due to right eye drooping, MRI brain and MRA head/neck are negative excluding sinusitis. She has been placed on oral doxycycline. Seen by neurology who recommends ENT consult if CNS imaging negative.    Plans for home at discharge       18 still has right eye pain/ blurred vision, headache, no focal weakness, ate ok, no BM change, has light and noise sensitivity     Objective:     Patient Vitals for the past 24 hrs:   Temp Pulse Resp BP SpO2   18 0800 97.4 °F (36.3 °C) 80 16 108/77 94 %   18 0421 97.6 °F (36.4 °C) 85 15 119/77 94 %   12/15/18 2329 97.9 °F (36.6 °C) 80 16 118/76 94 %   12/15/18 1959 97.6 °F (36.4 °C) 84 14 126/85 93 %   12/15/18 1636 96.7 °F (35.9 °C) 91 16 (!) 143/96 97 %   12/15/18 1608 98.5 °F (36.9 °C) 80 16 125/80 98 %     Oxygen Therapy  O2 Sat (%): 94 % (18 0800)  O2 Device: Room air (12/15/18 1608)    Intake/Output Summary (Last 24 hours) at 2018 1520  Last data filed at 2018 0617  Gross per 24 hour   Intake --   Output 590 ml   Net -590 ml       *Note that automatically entered I/Os may not be accurate; dependent on patient compliance with collection and accurate  by assistants. General:    Well nourished. Alert. No distress   CV:   RRR. No murmur, rub, or gallop. No edema  Lungs:   CTAB. No wheezing, rhonchi, or rales. Abdomen:   Soft, nontender, nondistended. obese  Extremities: Warm and dry. Skin:     No rashes or jaundice. Neuro:  No gross focal deficits    Data Review:  I have reviewed all labs, meds, telemetry events, and studies from the last 24 hours:    Recent Results (from the past 24 hour(s))   EKG, 12 LEAD, INITIAL    Collection Time: 12/15/18  8:14 PM   Result Value Ref Range    Ventricular Rate 84 BPM    Atrial Rate 84 BPM    P-R Interval 156 ms    QRS Duration 78 ms    Q-T Interval 382 ms    QTC Calculation (Bezet) 451 ms    Calculated P Axis 49 degrees    Calculated R Axis -7 degrees    Calculated T Axis 3 degrees    Diagnosis       Normal sinus rhythm  Nonspecific ST abnormality  When compared with ECG of 17-SEP-2015 06:56,  T wave amplitude has decreased in Anterior leads  Confirmed by Justo Scott MD (), SOLANGE SPANGLER ([id number]) on 12/16/2018 10:30:37 AM     LIPID PANEL    Collection Time: 12/16/18  6:48 AM   Result Value Ref Range    LIPID PROFILE          Cholesterol, total 159 MG/DL    Triglyceride 175 (H) 35 - 150 MG/DL    HDL Cholesterol 46 40 - 60 MG/DL    LDL, calculated 78 <100 MG/DL    VLDL, calculated 35 (H) 6.0 - 23.0 MG/DL    CHOL/HDL Ratio 3.5 <200          All Micro Results     None          No results found for this visit on 12/15/18.     Current Meds:  Current Facility-Administered Medications   Medication Dose Route Frequency    cyclobenzaprine (FLEXERIL) tablet 5 mg  5 mg Oral TID PRN    guaiFENesin ER (MUCINEX) tablet 600 mg  600 mg Oral Q12H    sodium chloride (NS) flush 5-10 mL  5-10 mL IntraVENous Q8H    sodium chloride (NS) flush 5-10 mL  5-10 mL IntraVENous PRN    celecoxib (CELEBREX) capsule 200 mg  200 mg Oral BID    citalopram (CELEXA) tablet 20 mg  20 mg Oral DAILY    gabapentin (NEURONTIN) capsule 300 mg  300 mg Oral TID    pantoprazole (PROTONIX) tablet 40 mg  40 mg Oral ACB    zolpidem (AMBIEN) tablet 5 mg  5 mg Oral QHS PRN    sodium chloride (NS) flush 5-10 mL  5-10 mL IntraVENous PRN    acetaminophen (TYLENOL) tablet 650 mg  650 mg Oral Q6H PRN    HYDROcodone-acetaminophen (NORCO) 7.5-325 mg per tablet 1 Tab  1 Tab Oral Q4H PRN    morphine injection 2 mg  2 mg IntraVENous Q4H PRN    naloxone (NARCAN) injection 0.4 mg  0.4 mg IntraVENous PRN    ondansetron (ZOFRAN) injection 4 mg  4 mg IntraVENous Q4H PRN    heparin (porcine) injection 5,000 Units  5,000 Units SubCUTAneous Q8H    doxycycline (VIBRAMYCIN) capsule 100 mg  100 mg Oral Q12H       Other Studies (last 24 hours):  Mra Brain Wo Cont    Result Date: 12/16/2018  Title: MRA [place] Indication:  Headache. Arterial dissection. Right thigh pain. Right eye ptosis. Headache. Right sphenoid sinusitis Comparison:  Head CT 12/15/2018 and earlier. Technique:  Contiguous axial 3D time-of-flight images of the brain were used to generate maximum intensity projection images of the [place] and vertebrobasilar system. Axial source images as well as maximum intensity reconstructed 3-D Images were reviewed at the request of the referring physician. All stenosis percentages derived by comparing the narrowest segment with the distal Internal Carotid Artery luminal diameter, as described in the [place] [employer] Symptomatic Carotid Endarterectomy Trial (NASCET) criteria. Findings: Extensive mucosal thickening in the right side of the sphenoid sinus. Axial source images of the brain demonstrate no midline shift. Normal high signal intensity consistent with flow and patency in the bilateral internal carotid arteries, middle cerebral arteries, anterior cerebral arteries, posterior cerebral arteries, and vertebral arteries.   Patent basilar artery and anterior communicating artery. Patent left posterior communicating artery. Minimal intracranial arterial mural irregularity. .      Impression:  Normal intracranial arteries. Right sphenoid sinusitis. Mra Neck W Cont    Result Date: 12/16/2018  Title:  Cervical MRA. Indication:  Arterial dissection. Right eye pain. Right eye ptosis. Headache. Technique:  Precontrast and postcontrast MRA images of the carotid and vertebral arteries were obtained and reconstructed three-dimensionally as per the referring physicians request.  Intravenous Gadolinium based contrast was used to best identify the arterial structures. All stenosis percentages derived by comparing the narrowest segment with the distal Internal Carotid Artery luminal diameter, as described in the [place] [employer] Symptomatic Carotid Endarterectomy Trial (NASCET) criteria. Comparison: None. Findings:  Normal diameter and patent bilateral common carotid, cervical internal carotid, and external carotid arteries. Normal diameter bilateral cervical vertebral arteries. Impression:  No cervical vertebral, common carotid, internal carotid, or external carotid artery stenosis or dissection. Mri Brain W Wo Cont    Result Date: 12/16/2018  MRI of the brain with and without contrast INDICATION:  Right frontal head pain and periorbital pain for several weeks moderate to severe with no improvement after medical treatment, photophobia, voice changes, right IJ grouping. History of Bell's palsy and sinusitis. COMPARISON: CT head yesterday and 8/27/2015 TECHNIQUE: Standard MRI sequences were obtained through the brain in multiple planes. Images were obtained before and after intravenous infusion of  19 mL Dotarem IV contrast. FINDINGS: There are no areas of significant signal abnormality in the brain. There is no evidence of acute hemorrhage or infarction. The ventricles are normal in size. There is no midline shift or mass effect. There are no extra-axial fluid collections. There are no focal suspicious osseous lesions. The globes, orbits, extraocular muscles appear symmetric and unremarkable. There is fluid and mucosal thickening throughout the right sphenoid sinus. The sinuses and mastoids otherwise are clear. Post-contrast images show no abnormal enhancement. IMPRESSION: 1. No evidence of acute intracranial abnormality. 2. Right sphenoid sinusitis. Assessment and Plan:     Hospital Problems as of 12/16/2018 Never Reviewed          Codes Class Noted - Resolved POA    * (Principal) Facial droop ICD-10-CM: R29.810  ICD-9-CM: 781.94  12/15/2018 - Present Yes        Ptosis ICD-10-CM: H02.409  ICD-9-CM: 374.30  12/15/2018 - Present Yes        Chronic pain (Chronic) ICD-10-CM: G89.29  ICD-9-CM: 338.29  12/15/2018 - Present Yes        Sinusitis ICD-10-CM: J32.9  ICD-9-CM: 473.9  12/15/2018 - Present Yes              Plan:    · Right facial droop/ ptosis: will regroup with neuro consult tomorrow since imaging is negative   · Sinusitis: continue oral doxycycline, add prednisone, treat headache complaints, will discuss with ENT tomorrow   · chronic pain: prn norco, continue celebrex/neurontin  · Depression: celexa     Discharge planning:   To home once stable  DVT ppx: heparin  Code status:  Full  Estimated LOS:  Greater than 2 midnights  Risk:  high  Care plan: armida [place] [phone]  Signed:                    Signed:  Zamzam Moore MD

## 2018-12-16 NOTE — PROGRESS NOTES
Awoke with c/o pain; Morphine 2 mg given IVP slowly for discomfort. Agrees to continue to call for asst to be out of bed.

## 2018-12-17 VITALS
DIASTOLIC BLOOD PRESSURE: 81 MMHG | BODY MASS INDEX: 32.95 KG/M2 | TEMPERATURE: 97.9 F | RESPIRATION RATE: 18 BRPM | OXYGEN SATURATION: 93 % | SYSTOLIC BLOOD PRESSURE: 143 MMHG | WEIGHT: 205 LBS | HEIGHT: 66 IN | HEART RATE: 94 BPM

## 2018-12-17 PROCEDURE — 99218 HC RM OBSERVATION: CPT

## 2018-12-17 PROCEDURE — 74011250637 HC RX REV CODE- 250/637: Performed by: HOSPITALIST

## 2018-12-17 PROCEDURE — 74011250637 HC RX REV CODE- 250/637: Performed by: INTERNAL MEDICINE

## 2018-12-17 PROCEDURE — 74011636637 HC RX REV CODE- 636/637: Performed by: INTERNAL MEDICINE

## 2018-12-17 PROCEDURE — 96376 TX/PRO/DX INJ SAME DRUG ADON: CPT

## 2018-12-17 PROCEDURE — 74011250636 HC RX REV CODE- 250/636: Performed by: INTERNAL MEDICINE

## 2018-12-17 PROCEDURE — 96372 THER/PROPH/DIAG INJ SC/IM: CPT

## 2018-12-17 RX ORDER — GUAIFENESIN 600 MG/1
600 TABLET, EXTENDED RELEASE ORAL EVERY 12 HOURS
Qty: 30 TAB | Refills: 0 | Status: SHIPPED | OUTPATIENT
Start: 2018-12-17 | End: 2019-01-15

## 2018-12-17 RX ORDER — PREDNISONE 20 MG/1
40 TABLET ORAL
Qty: 6 TAB | Refills: 0 | Status: SHIPPED | OUTPATIENT
Start: 2018-12-18 | End: 2019-01-15

## 2018-12-17 RX ORDER — DOXYCYCLINE 100 MG/1
100 CAPSULE ORAL EVERY 12 HOURS
Qty: 20 CAP | Refills: 0 | Status: SHIPPED | OUTPATIENT
Start: 2018-12-17 | End: 2019-01-15

## 2018-12-17 RX ORDER — FLUTICASONE PROPIONATE 50 MCG
2 SPRAY, SUSPENSION (ML) NASAL DAILY
Qty: 1 BOTTLE | Refills: 0 | Status: SHIPPED | OUTPATIENT
Start: 2018-12-17 | End: 2019-01-15

## 2018-12-17 RX ORDER — FLUTICASONE PROPIONATE 50 MCG
2 SPRAY, SUSPENSION (ML) NASAL DAILY
Status: DISCONTINUED | OUTPATIENT
Start: 2018-12-17 | End: 2018-12-17 | Stop reason: HOSPADM

## 2018-12-17 RX ADMIN — CITALOPRAM HYDROBROMIDE 20 MG: 20 TABLET ORAL at 07:39

## 2018-12-17 RX ADMIN — MORPHINE SULFATE 2 MG: 2 INJECTION, SOLUTION INTRAMUSCULAR; INTRAVENOUS at 14:26

## 2018-12-17 RX ADMIN — CELECOXIB 200 MG: 200 CAPSULE ORAL at 07:39

## 2018-12-17 RX ADMIN — DOXYCYCLINE HYCLATE 100 MG: 100 CAPSULE ORAL at 07:39

## 2018-12-17 RX ADMIN — PREDNISONE 40 MG: 20 TABLET ORAL at 07:39

## 2018-12-17 RX ADMIN — HEPARIN SODIUM 5000 UNITS: 5000 INJECTION INTRAVENOUS; SUBCUTANEOUS at 07:38

## 2018-12-17 RX ADMIN — CYCLOBENZAPRINE HYDROCHLORIDE 5 MG: 10 TABLET, FILM COATED ORAL at 10:43

## 2018-12-17 RX ADMIN — GUAIFENESIN 600 MG: 600 TABLET, EXTENDED RELEASE ORAL at 10:35

## 2018-12-17 RX ADMIN — PANTOPRAZOLE SODIUM 40 MG: 40 TABLET, DELAYED RELEASE ORAL at 07:38

## 2018-12-17 RX ADMIN — Medication 10 ML: at 05:10

## 2018-12-17 RX ADMIN — GABAPENTIN 300 MG: 300 CAPSULE ORAL at 15:52

## 2018-12-17 RX ADMIN — HYDROCODONE BITARTRATE AND ACETAMINOPHEN 1 TABLET: 7.5; 325 TABLET ORAL at 03:37

## 2018-12-17 RX ADMIN — HYDROCODONE BITARTRATE AND ACETAMINOPHEN 1 TABLET: 7.5; 325 TABLET ORAL at 07:45

## 2018-12-17 RX ADMIN — HEPARIN SODIUM 5000 UNITS: 5000 INJECTION INTRAVENOUS; SUBCUTANEOUS at 00:08

## 2018-12-17 RX ADMIN — Medication 10 ML: at 14:26

## 2018-12-17 RX ADMIN — FLUTICASONE PROPIONATE 2 SPRAY: 50 SPRAY, METERED NASAL at 07:47

## 2018-12-17 RX ADMIN — ACETAMINOPHEN 650 MG: 325 TABLET, FILM COATED ORAL at 01:51

## 2018-12-17 RX ADMIN — MORPHINE SULFATE 2 MG: 2 INJECTION, SOLUTION INTRAMUSCULAR; INTRAVENOUS at 00:19

## 2018-12-17 RX ADMIN — GABAPENTIN 300 MG: 300 CAPSULE ORAL at 07:39

## 2018-12-17 NOTE — DISCHARGE SUMMARY
Hospitalist Discharge Summary     Admit Date:  12/15/2018  1:26 PM   Name:  Ashlee Coleman   Age:  63 y.o.  :  1960   MRN:  [id number]   PCP:  Rajwinder Kay NP  Treatment Team: Attending Provider: Jerri Villegas MD; Consulting Provider: Sj Glass MD; Utilization Review: Madison Reid RN    Problem List for this Hospitalization:  Hospital Problems as of 2018 Never Reviewed          Codes Class Noted - Resolved POA    * (Principal) Facial droop ICD-10-CM: R29.810  ICD-9-CM: 781.94  12/15/2018 - Present Yes        Ptosis ICD-10-CM: H02.409  ICD-9-CM: 374.30  12/15/2018 - Present Yes        Chronic pain (Chronic) ICD-10-CM: G89.29  ICD-9-CM: 338.29  12/15/2018 - Present Yes        Sinusitis ICD-10-CM: J32.9  ICD-9-CM: 473.9  12/15/2018 - Present Yes                    Hospital Course:      Ms. Jac Gee is a 64 yo female with PMH of chronic neck pain followed by pain managment who is evaluated with complaints of right eye pain for several weeks with head pressure. She has been treated with several rounds of antibiotics per her PCP without improvement. She has light / noise sensitivity, no prior migraines. Has head pressure and hoarseness. She was sent to the ED after being seen by PCP who noted right eye drooping. She has prior bells palsy affecting her right face that resolved. CT head shows right sphenoid sinusitis. she was admitted for TIA / CVA workup due to right eye drooping, MRI brain and MRA head/neck are negative excluding sinusitis. She has been placed on oral doxycycline and steroids. She was Seen by neurology on 2 occassions who recommends ENT consult. She will see ENT outpatient. She is stable with Plans for home at discharge         Follow up instructions and discharge meds at bottom of this note. Plan was discussed with patient  All questions answered. Patient was stable at time of discharge.     Diagnostic Imaging/Tests:   Mra Brain Wo Cont    Result Date: 12/16/2018  Title: MRA [place] Indication:  Headache. Arterial dissection. Right thigh pain. Right eye ptosis. Headache. Right sphenoid sinusitis Comparison:  Head CT 12/15/2018 and earlier. Technique:  Contiguous axial 3D time-of-flight images of the brain were used to generate maximum intensity projection images of the [place] and vertebrobasilar system. Axial source images as well as maximum intensity reconstructed 3-D Images were reviewed at the request of the referring physician. All stenosis percentages derived by comparing the narrowest segment with the distal Internal Carotid Artery luminal diameter, as described in the [address] [facility] American Symptomatic Carotid Endarterectomy Trial (NASCET) criteria. Findings: Extensive mucosal thickening in the right side of the sphenoid sinus. Axial source images of the brain demonstrate no midline shift. Normal high signal intensity consistent with flow and patency in the bilateral internal carotid arteries, middle cerebral arteries, anterior cerebral arteries, posterior cerebral arteries, and vertebral arteries. Patent basilar artery and anterior communicating artery. Patent left posterior communicating artery. Minimal intracranial arterial mural irregularity. .      Impression:  Normal intracranial arteries. Right sphenoid sinusitis. Mra Neck W Cont    Result Date: 12/16/2018  Title:  Cervical MRA. Indication:  Arterial dissection. Right eye pain. Right eye ptosis. Headache. Technique:  Precontrast and postcontrast MRA images of the carotid and vertebral arteries were obtained and reconstructed three-dimensionally as per the referring physicians request.  Intravenous Gadolinium based contrast was used to best identify the arterial structures.   All stenosis percentages derived by comparing the narrowest segment with the distal Internal Carotid Artery luminal diameter, as described in the [address] [facility] American Symptomatic Carotid Endarterectomy Trial (NASCET) criteria. Comparison: None. Findings:  Normal diameter and patent bilateral common carotid, cervical internal carotid, and external carotid arteries. Normal diameter bilateral cervical vertebral arteries. Impression:  No cervical vertebral, common carotid, internal carotid, or external carotid artery stenosis or dissection. Mri Brain W Wo Cont    Result Date: 12/16/2018  MRI of the brain with and without contrast INDICATION:  Right frontal head pain and periorbital pain for several weeks moderate to severe with no improvement after medical treatment, photophobia, voice changes, right IJ grouping. History of Bell's palsy and sinusitis. COMPARISON: CT head yesterday and 8/27/2015 TECHNIQUE: Standard MRI sequences were obtained through the brain in multiple planes. Images were obtained before and after intravenous infusion of  19 mL Dotarem IV contrast. FINDINGS: There are no areas of significant signal abnormality in the brain. There is no evidence of acute hemorrhage or infarction. The ventricles are normal in size. There is no midline shift or mass effect. There are no extra-axial fluid collections. There are no focal suspicious osseous lesions. The globes, orbits, extraocular muscles appear symmetric and unremarkable. There is fluid and mucosal thickening throughout the right sphenoid sinus. The sinuses and mastoids otherwise are clear. Post-contrast images show no abnormal enhancement. IMPRESSION: 1. No evidence of acute intracranial abnormality. 2. Right sphenoid sinusitis. Ct Head Wo Cont    Result Date: 12/15/2018  Noncontrast head CT Clinical Indication: 3 weeks of persisting generalized moderate to severe headache. Technique: Noncontrast axial images were obtained through the brain.  All CT scans at this location are performed using dose modulation techniques as appropriate including the following: Automated exposure control, adjustment of the MA and/or kV according to patient's size, or use of iterative reconstruction technique. Comparison: 8/27/2015 and CT 8/7/2015 MRI Findings: There is no acute intracranial hemorrhage, hydrocephalus, intra-axial mass, or mass-effect. There is no CT evidence of acute large artery territorial infarction or abnormal extra-axial fluid collection. There is a right sphenoid sinus air-fluid level which may indicate acute infection. The mastoid air cells and paranasal sinuses are otherwise clear where imaged. No displaced skull fractures are present. Impression: No acute intracranial abnormality. Right sphenoid sinusitis. Echocardiogram results:  No results found for this visit on 12/15/18.       All Micro Results     None          Labs: Results:       BMP, Mg, Phos Recent Labs     12/15/18  1446      K 4.1      CO2 27   AGAP 9   BUN 10   CREA 0.79   CA 8.9   GLU 96      CBC Recent Labs     12/15/18  1446   WBC 8.4   RBC 4.70   HGB 11.6*   HCT 37.4   *   GRANS 49   LYMPH 41   EOS 2   MONOS 7   BASOS 1   IG 0   ANEU 4.1   ABL 3.5   ANN 0.2   ABM 0.6   ABB 0.1   AIG 0.0      LFT Recent Labs     12/15/18  1446   SGOT 26   ALT 42      TP 7.9   ALB 3.9   GLOB 4.0*   AGRAT 1.0      Cardiac Testing Lab Results   Component Value Date/Time    Troponin-I, Qt. <0.04 11/14/2013 05:00 PM      Coagulation Tests No results found for: PTP, INR, APTT   A1c No results found for: HBA1C, HGBE8, HTZ8LGZX   Lipid Panel Lab Results   Component Value Date/Time    Cholesterol, total 159 12/16/2018 06:48 AM    HDL Cholesterol 46 12/16/2018 06:48 AM    LDL, calculated 78 12/16/2018 06:48 AM    VLDL, calculated 35 (H) 12/16/2018 06:48 AM    Triglyceride 175 (H) 12/16/2018 06:48 AM    CHOL/HDL Ratio 3.5 12/16/2018 06:48 AM      Thyroid Panel No results found for: TSH, T4, FT4, TT3, T3U, TSHEXT     Most Recent UA Lab Results   Component Value Date/Time    WBC 5-10 09/13/2015 03:36 AM    RBC 0-3 09/13/2015 03:36 AM    Epithelial cells 0-3 09/13/2015 03:36 AM    Bacteria 0 09/13/2015 03:36 AM    Casts 0 09/13/2015 03:36 AM        Allergies   Allergen Reactions    Pcn [Penicillins] Anaphylaxis    Aspirin Other (comments)       There is no immunization history on file for this patient. All Labs from Last 24 Hrs:  No results found for this or any previous visit (from the past 24 hour(s)).     Current Med List in Hospital:   Current Facility-Administered Medications   Medication Dose Route Frequency    fluticasone (FLONASE) 50 mcg/actuation nasal spray 2 Spray  2 Spray Both Nostrils DAILY    cyclobenzaprine (FLEXERIL) tablet 5 mg  5 mg Oral TID PRN    guaiFENesin ER (MUCINEX) tablet 600 mg  600 mg Oral Q12H    predniSONE (DELTASONE) tablet 40 mg  40 mg Oral DAILY WITH BREAKFAST    sodium chloride (NS) flush 5-10 mL  5-10 mL IntraVENous Q8H    sodium chloride (NS) flush 5-10 mL  5-10 mL IntraVENous PRN    celecoxib (CELEBREX) capsule 200 mg  200 mg Oral BID    citalopram (CELEXA) tablet 20 mg  20 mg Oral DAILY    gabapentin (NEURONTIN) capsule 300 mg  300 mg Oral TID    pantoprazole (PROTONIX) tablet 40 mg  40 mg Oral ACB    zolpidem (AMBIEN) tablet 5 mg  5 mg Oral QHS PRN    sodium chloride (NS) flush 5-10 mL  5-10 mL IntraVENous PRN    acetaminophen (TYLENOL) tablet 650 mg  650 mg Oral Q6H PRN    HYDROcodone-acetaminophen (NORCO) 7.5-325 mg per tablet 1 Tab  1 Tab Oral Q4H PRN    morphine injection 2 mg  2 mg IntraVENous Q4H PRN    naloxone (NARCAN) injection 0.4 mg  0.4 mg IntraVENous PRN    ondansetron (ZOFRAN) injection 4 mg  4 mg IntraVENous Q4H PRN    heparin (porcine) injection 5,000 Units  5,000 Units SubCUTAneous Q8H    doxycycline (VIBRAMYCIN) capsule 100 mg  100 mg Oral Q12H       Discharge Exam:  Patient Vitals for the past 24 hrs:   Temp Pulse Resp BP SpO2   12/17/18 1140 97.9 °F (36.6 °C) 94 18 143/81 93 %   12/17/18 0810 98.9 °F (37.2 °C) 96 18 123/79 97 %   12/17/18 0343 97.8 °F (36.6 °C) 97 16 140/76 96 %   12/17/18 0024 97.6 °F (36.4 °C) 94 16 145/83 96 %   12/16/18 1954 97.4 °F (36.3 °C) 84 15 145/87 97 %   12/16/18 1700 97.5 °F (36.4 °C) 85 -- 113/80 96 %     Oxygen Therapy  O2 Sat (%): 93 % (12/17/18 1140)  O2 Device: Room air (12/15/18 1608)  No intake or output data in the 24 hours ending 12/17/18 1601    *Note that automatically entered I/Os may not be accurate; dependent on patient compliance with collection and accurate  by assistants. General:    Well nourished. Alert. CV:   Regular rate and rhythm. No murmur, rub, or gallop. No edema  Lungs:  Clear to auscultation bilaterally. No wheezing, rhonchi, or rales. Abdomen: Soft, nontender, nondistended. Bowel sounds normal.   Extremities: Warm and dry. Skin:     No rashes or jaundice. Psych:  Normal mood and affect. Discharge Info:   Current Discharge Medication List      START taking these medications    Details   doxycycline (VIBRAMYCIN) 100 mg capsule Take 1 Cap by mouth every twelve (12) hours. Qty: 20 Cap, Refills: 0      fluticasone (FLONASE) 50 mcg/actuation nasal spray 2 Sprays by Both Nostrils route daily. Qty: 1 Bottle, Refills: 0      guaiFENesin ER (MUCINEX) 600 mg ER tablet Take 1 Tab by mouth every twelve (12) hours. Qty: 30 Tab, Refills: 0      predniSONE (DELTASONE) 20 mg tablet Take 40 mg by mouth daily (with breakfast). Qty: 6 Tab, Refills: 0         CONTINUE these medications which have NOT CHANGED    Details   ZOLPIDEM TARTRATE (AMBIEN PO) Take 10 mg by mouth nightly. OXYCODONE HCL/ACETAMINOPHEN (PERCOCET PO) Take 10 mg by mouth every eight (8) hours as needed for Pain. citalopram (CELEXA) 20 mg tablet Take 20 mg by mouth daily. pantoprazole (PROTONIX) 40 mg tablet Take 40 mg by mouth daily. celecoxib (CELEBREX) 200 mg capsule Take 200 mg by mouth two (2) times a day.        gabapentin (NEURONTIN) 300 mg capsule Take 300 mg by mouth three (3) times daily.                  Disposition: home Activity: Activity as tolerated  Diet: DIET REGULAR    Follow-up Appointments   Procedures    FOLLOW UP VISIT Appointment in: One Week 1 week PCP and ENT will call her for visit     1 week PCP and ENT will call her for visit     Standing Status:   Standing     Number of Occurrences:   1     Order Specific Question:   Appointment in     Answer: One Week         Follow-up Information     Follow up With Specialties Details Why Contact China Correa NP Nurse Practitioner, [address]  [phone]            Time spent in patient discharge planning and coordination 45 minutes.     Signed:  Magen Sotelo MD

## 2018-12-17 NOTE — PROGRESS NOTES
Pt c/o sinus pressure and sharp pain behind her right eye. Pt requesting to be put on IV fluids and \"mucinex and prednisone IV\" to relief the pressure. Dr. Ehsan Harmon notified of pt's request. Samuel Head for Flonase daily received. Will continue to monitor.

## 2018-12-17 NOTE — PROGRESS NOTES
[address] [facility] December 17, 2018       RE: Saba Haji      To Whom It May Concern,    This is to certify that Saba Haji may return to work on 10/19/2018. Please feel free to contact my office if you have any questions or concerns. Thank you for your assistance in this matter.       Sincerely,  Jatinder Ibarra RN  [phone]

## 2018-12-17 NOTE — PROGRESS NOTES
Pt c/o back, neck, and eye pain rated 8/10  PRN Morphine 2mg IVP administered per MD order.  Will continue to monitor.

## 2018-12-17 NOTE — PROGRESS NOTES
Shift  assessment completed via doc flow sheet. Pt A & O x 4. Lungs CTA, HR WNL, NSR. Abdomen soft and non tender. IVS dressing loose but dry, no s/sx of infection/infiltration noted, flushed without difficulty. Old tape removed, new dressing applied. Pt able to make needs known. No concerns at this time. Bed low and locked. Call light within reach. Will continue to monitor.

## 2018-12-17 NOTE — PROGRESS NOTES
A.M assessment complete; pt in bed. Alert & oriented. Resp even & unlabored on room air; lungs clear. HR regular. Abdomen soft with active bowel sounds. Right eye droop noted. Pt c/o right eye, back & neck pain 8/10. Will bring pain meds & continue to monitor.

## 2018-12-17 NOTE — DISCHARGE INSTRUCTIONS
Disposition: home    Activity: Activity as tolerated  Diet: DIET REGULAR  Go see PCP in 1 week and ENT will call you with an appt. DISCHARGE SUMMARY from Nurse    PATIENT INSTRUCTIONS:    After general anesthesia or intravenous sedation, for 24 hours or while taking prescription Narcotics:  · Limit your activities  · Do not drive and operate hazardous machinery  · Do not make important personal or business decisions  · Do  not drink alcoholic beverages  · If you have not urinated within 8 hours after discharge, please contact your surgeon on call. Report the following to your surgeon:  · Excessive pain, swelling, redness or odor of or around the surgical area  · Temperature over 100.5  · Nausea and vomiting lasting longer than 4 hours or if unable to take medications  · Any signs of decreased circulation or nerve impairment to extremity: change in color, persistent  numbness, tingling, coldness or increase pain  · Any questions    What to do at Home:  Recommended activity: Activity as tolerated, see above    If you experience any of the following symptoms any symptoms of TIA/stroke call 911, any fever, other worrisome symptoms, please follow up with ENT or PCP. *  Please give a list of your current medications to your Primary Care Provider. *  Please update this list whenever your medications are discontinued, doses are      changed, or new medications (including over-the-counter products) are added. *  Please carry medication information at all times in case of emergency situations. These are general instructions for a healthy lifestyle:    No smoking/ No tobacco products/ Avoid exposure to second hand smoke  Surgeon General's Warning:  Quitting smoking now greatly reduces serious risk to your health.     Obesity, smoking, and sedentary lifestyle greatly increases your risk for illness    A healthy diet, regular physical exercise & weight monitoring are important for maintaining a healthy lifestyle    You may be retaining fluid if you have a history of heart failure or if you experience any of the following symptoms:  Weight gain of 3 pounds or more overnight or 5 pounds in a week, increased swelling in our hands or feet or shortness of breath while lying flat in bed. Please call your doctor as soon as you notice any of these symptoms; do not wait until your next office visit. Recognize signs and symptoms of STROKE:    F-face looks uneven    A-arms unable to move or move unevenly    S-speech slurred or non-existent    T-time-call 911 as soon as signs and symptoms begin-DO NOT go       Back to bed or wait to see if you get better-TIME IS BRAIN. Warning Signs of HEART ATTACK     Call 911 if you have these symptoms:   Chest discomfort. Most heart attacks involve discomfort in the center of the chest that lasts more than a few minutes, or that goes away and comes back. It can feel like uncomfortable pressure, squeezing, fullness, or pain.  Discomfort in other areas of the upper body. Symptoms can include pain or discomfort in one or both arms, the back, neck, jaw, or stomach.  Shortness of breath with or without chest discomfort.  Other signs may include breaking out in a cold sweat, nausea, or lightheadedness. Don't wait more than five minutes to call 911 - MINUTES MATTER! Fast action can save your life. Calling 911 is almost always the fastest way to get lifesaving treatment. Emergency Medical Services staff can begin treatment when they arrive -- up to an hour sooner than if someone gets to the hospital by car. The discharge information has been reviewed with the patient. The patient verbalized understanding. Discharge medications reviewed with the patient and appropriate educational materials and side effects teaching were provided.   ___________________________________________________________________________________________________________________________________ Transient Ischemic Attack: Care Instructions  Your Care Instructions    A transient ischemic attack (TIA) is when blood flow to a part of your brain is blocked for a short time. A TIA is like a stroke but usually lasts only a few minutes. A TIA does not cause lasting brain damage. Any vision problems, slurred speech, or other symptoms usually go away in 10 to 20 minutes. But they may last for up to 24 hours. TIAs are often warning signs of a stroke. Some people who have a TIA may have a stroke in the future. A stroke can cause symptoms like those of a TIA. But a stroke causes lasting damage to your brain. You can take steps to help prevent a stroke. One thing you can do is get early treatment. If you have other new symptoms, or if your symptoms do not get better, go back to the emergency room or call your doctor right away. Getting treatment right away may prevent long-term brain damage caused by a stroke. The doctor has checked you carefully, but problems can develop later. If you notice any problems or new symptoms, get medical treatment right away. Follow-up care is a key part of your treatment and safety. Be sure to make and go to all appointments, and call your doctor if you are having problems. It's also a good idea to know your test results and keep a list of the medicines you take. How can you care for yourself at home? Medicines    · Be safe with medicines. Take your medicines exactly as prescribed. Call your doctor if you think you are having a problem with your medicine.     · If you take a blood thinner, such as aspirin, be sure you get instructions about how to take your medicine safely.  Blood thinners can cause serious bleeding problems.     · Call your doctor if you are not able to take your medicines for any reason.     · Do not take any over-the-counter medicines or herbal products without talking to your doctor first.     · If you take birth control pills or hormone therapy, talk to your doctor. Ask if these treatments are right for you.    Lifestyle changes    · Do not smoke. If you need help quitting, talk to your doctor about stop-smoking programs and medicines.     · Be active. If your doctor recommends it, get more exercise. Walking is a good choice. Bit by bit, increase the amount you walk every day. Try for at least 30 minutes on most days of the week. You also may want to swim, bike, or do other activities.     · Eat heart-healthy foods. These include fruits, vegetables, high-fiber foods, fish, and foods that are low in sodium, saturated fat, and trans fat.     · Stay at a healthy weight. Lose weight if you need to.     · Limit alcohol to 2 drinks a day for men and 1 drink a day for women.    Staying healthy    · Manage other health problems such as diabetes, high blood pressure, and high cholesterol.     · Get the flu vaccine every year. When should you call for help? Call 911 anytime you think you may need emergency care. For example, call if:    · You have new or worse symptoms of a stroke. These may include:  ? Sudden numbness, tingling, weakness, or loss of movement in your face, arm, or leg, especially on only one side of your body. ? Sudden vision changes. ? Sudden trouble speaking. ? Sudden confusion or trouble understanding simple statements. ? Sudden problems with walking or balance. ? A sudden, severe headache that is different from past headaches. Call 911 even if these symptoms go away in a few minutes.     · You feel like you are having another TIA.    Watch closely for changes in your health, and be sure to contact your doctor if you have any problems. Where can you learn more? Go to http://francisco-ary.info/. Enter [phone] in the search box to learn more about \"Transient Ischemic Attack: Care Instructions. \"  Current as of: November 21, 2017  Content Version: 11.8  © 5800-6382 Healthwise, Incorporated.  Care instructions adapted under license by [employer] (which disclaims liability or warranty for this information). If you have questions about a medical condition or this instruction, always ask your healthcare professional. [address] any warranty or liability for your use of this information. Sinusitis: Care Instructions  Your Care Instructions    Sinusitis is an infection of the lining of the sinus cavities in your head. Sinusitis often follows a cold. It causes pain and pressure in your head and face. In most cases, sinusitis gets better on its own in 1 to 2 weeks. But some mild symptoms may last for several weeks. Sometimes antibiotics are needed. Follow-up care is a key part of your treatment and safety. Be sure to make and go to all appointments, and call your doctor if you are having problems. It's also a good idea to know your test results and keep a list of the medicines you take. How can you care for yourself at home? · Take an over-the-counter pain medicine, such as acetaminophen (Tylenol), ibuprofen (Advil, Motrin), or naproxen (Aleve). Read and follow all instructions on the label. · If the doctor prescribed antibiotics, take them as directed. Do not stop taking them just because you feel better. You need to take the full course of antibiotics. · Be careful when taking over-the-counter cold or flu medicines and Tylenol at the same time. Many of these medicines have acetaminophen, which is Tylenol. Read the labels to make sure that you are not taking more than the recommended dose. Too much acetaminophen (Tylenol) can be harmful. · Breathe warm, moist air from a steamy shower, a hot bath, or a sink filled with hot water. Avoid cold, dry air. Using a humidifier in your home may help. Follow the directions for cleaning the machine. · Use saline (saltwater) nasal washes to help keep your nasal passages open and wash out mucus and bacteria.  You can buy saline nose drops at a grocery store or drugstore. Or you can make your own at home by adding 1 teaspoon of salt and 1 teaspoon of baking soda to 2 cups of distilled water. If you make your own, fill a bulb syringe with the solution, insert the tip into your nostril, and squeeze gently. [place] your nose. · Put a hot, wet towel or a warm gel pack on your face 3 or 4 times a day for 5 to 10 minutes each time. · Try a decongestant nasal spray like oxymetazoline (Afrin). Do not use it for more than 3 days in a row. Using it for more than 3 days can make your congestion worse. When should you call for help? Call your doctor now or seek immediate medical care if:    · You have new or worse swelling or redness in your face or around your eyes.     · You have a new or higher fever.    Watch closely for changes in your health, and be sure to contact your doctor if:    · You have new or worse facial pain.     · The mucus from your nose becomes thicker (like pus) or has new blood in it.     · You are not getting better as expected. Where can you learn more? Go to http://francisco-ary.info/. Enter B708 in the search box to learn more about \"Sinusitis: Care Instructions. \"  Current as of: March 28, 2018  Content Version: 11.8  © 8693-1801 Healthwise, Incorporated. Care instructions adapted under license by [employer] (which disclaims liability or warranty for this information). If you have questions about a medical condition or this instruction, always ask your healthcare professional. [place] [address] any warranty or liability for your use of this information.

## 2018-12-17 NOTE — PROGRESS NOTES
Pt c/o back, neck, and eye pain rated 8/10 and insomnia. PRN Narco 7.5-325mg PO administered per MD order. Will continue to monitor.

## 2018-12-17 NOTE — PROGRESS NOTES
Pt c/o back, neck, and eye pain rated 8/10. PRN Flexeril 5mg PO administered per MD order. Will continue to monitor.

## 2018-12-17 NOTE — CONSULTS
Impression:    Sphenoid sinusitis with headache secondary to that    Recommendation:    Continue oral antibiotics. ENT evaluation as an outpatient. The patient is a 63year old female with a history of severe eye pain in the right eye and the right side of the face beginning 3-4 weeks ago. She has been treated repeatedly with antibiotics without any benefit. She was seen at her PCP on Saturday Dec 15 and was sent to the ED for evaluation. At this time the patient does report congestion and feels like she is in the ocean. She was started on another course of AB steroids and Mucinex yesterday. She complains of photophobia and sensitivity to loud sounds. She occasionally has diplopia. She reports blurred vision. She is concerned about facial fasiculations. She has difficulty swallowing. Imaging has demonstrated finding of sphenoid sinusitis. An MRA of the head has been done and has been read as negative. Past Medical History:   Diagnosis Date    Arthritis     Gastrointestinal disorder     diverticulitis-gerd    Other ill-defined conditions(799.89)     Chronic back,neck,hip pain.     Other unknown and unspecified cause of morbidity or mortality     bells palsy    Psychiatric disorder     anxiety       Past Surgical History:   Procedure Laterality Date    HX CHOLECYSTECTOMY      HX HYSTERECTOMY      HX ORTHOPAEDIC      neck x2    NEUROLOGICAL PROCEDURE UNLISTED      cervical fusion     Social History     Tobacco Use    Smoking status: Never Smoker   Substance Use Topics    Alcohol use: No    Drug use: No     Family History   Problem Relation Age of Onset    Breast Cancer Paternal Aunt 80         Current Facility-Administered Medications:     fluticasone (FLONASE) 50 mcg/actuation nasal spray 2 Spray, 2 Spray, Both Nostrils, DAILY, Kristene Rubinstein, MD, 2 [place] at 12/17/18 0703    cyclobenzaprine (FLEXERIL) tablet 5 mg, 5 mg, Oral, TID PRN, Sandro Helm MD, 5 mg at 12/17/18 1043    guaiFENesin ER (MUCINEX) tablet 600 mg, 600 mg, Oral, Q12H, Arminda Helm MD, 600 mg at 12/17/18 1035    predniSONE (DELTASONE) tablet 40 mg, 40 mg, Oral, DAILY WITH BREAKFAST, Arminda Helm MD, 40 mg at 12/17/18 0739    sodium chloride (NS) flush 5-10 mL, 5-10 mL, IntraVENous, Q8H, Denilson Rodriguez MD, 10 mL at 12/17/18 1426    sodium chloride (NS) flush 5-10 mL, 5-10 mL, IntraVENous, PRN, Denilson Rodriguez MD    celecoxib (CELEBREX) capsule 200 mg, 200 mg, Oral, BID, Jason Tyler MD, 200 mg at 12/17/18 0739    citalopram (CELEXA) tablet 20 mg, 20 mg, Oral, DAILY, Jason Tyler MD, 20 mg at 12/17/18 0739    gabapentin (NEURONTIN) capsule 300 mg, 300 mg, Oral, TID, Arminda Helm MD, 300 mg at 12/17/18 1552    pantoprazole (PROTONIX) tablet 40 mg, 40 mg, Oral, ACB, Jason Tyler MD, 40 mg at 12/17/18 0738    zolpidem (AMBIEN) tablet 5 mg, 5 mg, Oral, QHS PRN, Jason Tyler MD, 5 mg at 12/16/18 2213    sodium chloride (NS) flush 5-10 mL, 5-10 mL, IntraVENous, PRN, Clif Helm MD    acetaminophen (TYLENOL) tablet 650 mg, 650 mg, Oral, Q6H PRN, Arminda Helm MD, 650 mg at 12/17/18 0151    HYDROcodone-acetaminophen (NORCO) 7.5-325 mg per tablet 1 Tab, 1 Tab, Oral, Q4H PRN, Jason Tyler MD, 1 Tab at 12/17/18 0745    morphine injection 2 mg, 2 mg, IntraVENous, Q4H PRN, Arminda Helm MD, 2 mg at 12/17/18 1426    naloxone (NARCAN) injection 0.4 mg, 0.4 mg, IntraVENous, PRN, Clif Helm MD    ondansetron (ZOFRAN) injection 4 mg, 4 mg, IntraVENous, Q4H PRN, Clif Helm MD    heparin (porcine) injection 5,000 Units, 5,000 Units, SubCUTAneous, Q8H, Clif Helm MD, 5,000 Units at 12/17/18 0738    doxycycline (VIBRAMYCIN) capsule 100 mg, 100 mg, Oral, Q12H, Clif Helm MD, 100 mg at 12/17/18 0739    Allergies   Allergen Reactions    Pcn [Penicillins] Anaphylaxis    Aspirin Other (comments)     12 point review of systems otherwise negative except as stated    Visit Vitals  /81 (BP 1 Location: Left arm, BP Patient Position: At rest;Supine)   Pulse 94   Temp 97.9 °F (36.6 °C)   Resp 18   Ht 5' 6\" (1.676 m)   Wt 205 lb (93 kg)   SpO2 93%   BMI 33.09 kg/m²     General: well nourished, appears stated age    Eyes: no proptosis or exophthalmos; conjunctivae clear, sclerae non-icteric    Chest: clear to auscultation    Cardiac: normal S1 S2; no murmurs gallop or rubs    Neurological:    MSE: alert, oriented times 3; fluent speech; no paraphasic errors; follows commands without difficulty    CN 2: visual fields full; no afferent pupillary defect; VA not checked; fundoscopic exam ... CN 3,4,6: Pupils symmetrical in size, reactive to light directly and consensually; no ptosis; full versions and ductions  CN 5: facial sensation intact to light touch and pin prick. Corneal reflex . .. CN 7: Symmetrical facial tone and movements  CN 8 responds to spoken voice  CN 9,10; palate symmetrical gag intact  CN 11: head turn and shoulder shrug intact  CN 12: tongue midline without atrophy or fasiculations    Motor:  Power 5/5 UE and LE proximal to distal  Fine motor movements symmetrical  Tone normal  Atrophy: absent  Gait: symmetrical arm swing, rises on heels and toes    Cerebellar:  Finger to nose; heel to shin intact  Tandem intact    Sensory  Romberg negative  Intact to primary modalities in all 4 extremities    Reflexes    Symmetrical and normally active at 2+ in UE and LE  Plantar response flexor bilaterally    MRI of the brain with and without contrast     INDICATION:  Right frontal head pain and periorbital pain for several weeks  moderate to severe with no improvement after medical treatment, photophobia,  voice changes, right IJ grouping.  History of Bell's palsy and sinusitis.     COMPARISON: CT head yesterday and 8/27/2015     TECHNIQUE: Standard MRI sequences were obtained through the brain in multiple  planes. Images were obtained before and after intravenous infusion of  19 mL  Dotarem IV contrast.     FINDINGS: There are no areas of significant signal abnormality in the brain.       There is no evidence of acute hemorrhage or infarction. The ventricles are  normal in size. There is no midline shift or mass effect. There are no  extra-axial fluid collections.       There are no focal suspicious osseous lesions. The globes, orbits, extraocular  muscles appear symmetric and unremarkable.     There is fluid and mucosal thickening throughout the right sphenoid sinus. The  sinuses and mastoids otherwise are clear.     Post-contrast images show no abnormal enhancement.        IMPRESSION  IMPRESSION:  1. No evidence of acute intracranial abnormality. 2. Right sphenoid sinusitis. Title: MRA [place]     Indication:  Headache. Arterial dissection. Right thigh pain. Right eye  ptosis. Headache. Right sphenoid sinusitis     Comparison:  Head CT 12/15/2018 and earlier.     Technique:  Contiguous axial 3D time-of-flight images of the brain were used to  generate maximum intensity projection images of the [place] and  vertebrobasilar system.    Axial source images as well as maximum intensity  reconstructed 3-D Images were reviewed at the request of the referring  physician.     All stenosis percentages derived by comparing the narrowest segment with the  distal Internal Carotid Artery luminal diameter, as described in the [place]  American Symptomatic Carotid Endarterectomy Trial (NASCET) criteria.      Findings: Extensive mucosal thickening in the right side of the sphenoid sinus.     Axial source images of the brain demonstrate no midline shift.       Normal high signal intensity consistent with flow and patency in the bilateral  internal carotid arteries, middle cerebral arteries, anterior cerebral arteries,  posterior cerebral arteries, and vertebral arteries. Patent basilar artery and  anterior communicating artery. Patent left posterior communicating artery.      Minimal intracranial arterial mural irregularity. .       IMPRESSION  Impression:  Normal intracranial arteries.     Right sphenoid sinusitis.

## 2018-12-17 NOTE — PROGRESS NOTES
Pt c/o back, neck, pain rated 8/10. PRN Narco 7.5-325mg PO administered per MD order. Will continue to monitor.

## 2019-01-15 ENCOUNTER — HOSPITAL ENCOUNTER (EMERGENCY)
Age: 59
Discharge: HOME | End: 2019-01-15
Attending: EMERGENCY MEDICINE
Payer: COMMERCIAL

## 2019-01-15 VITALS
OXYGEN SATURATION: 98 % | HEART RATE: 107 BPM | WEIGHT: 200 LBS | HEIGHT: 66 IN | DIASTOLIC BLOOD PRESSURE: 86 MMHG | SYSTOLIC BLOOD PRESSURE: 144 MMHG | RESPIRATION RATE: 16 BRPM | BODY MASS INDEX: 32.14 KG/M2 | TEMPERATURE: 98 F

## 2019-01-15 DIAGNOSIS — R51.9 NONINTRACTABLE EPISODIC HEADACHE, UNSPECIFIED HEADACHE TYPE: Primary | ICD-10-CM

## 2019-01-15 PROCEDURE — 96375 TX/PRO/DX INJ NEW DRUG ADDON: CPT | Performed by: EMERGENCY MEDICINE

## 2019-01-15 PROCEDURE — 75810000133 HC LUMBAR PUNCTURE: Performed by: EMERGENCY MEDICINE

## 2019-01-15 PROCEDURE — 74011000250 HC RX REV CODE- 250: Performed by: EMERGENCY MEDICINE

## 2019-01-15 PROCEDURE — 74011000258 HC RX REV CODE- 258: Performed by: EMERGENCY MEDICINE

## 2019-01-15 PROCEDURE — 96367 TX/PROPH/DG ADDL SEQ IV INF: CPT | Performed by: EMERGENCY MEDICINE

## 2019-01-15 PROCEDURE — 99285 EMERGENCY DEPT VISIT HI MDM: CPT | Performed by: EMERGENCY MEDICINE

## 2019-01-15 PROCEDURE — 74011250636 HC RX REV CODE- 250/636: Performed by: EMERGENCY MEDICINE

## 2019-01-15 PROCEDURE — 77030014143 HC TY PUNC LUMBR BD -A

## 2019-01-15 PROCEDURE — 96365 THER/PROPH/DIAG IV INF INIT: CPT | Performed by: EMERGENCY MEDICINE

## 2019-01-15 RX ORDER — BUPIVACAINE HYDROCHLORIDE 2.5 MG/ML
10 INJECTION, SOLUTION EPIDURAL; INFILTRATION; INTRACAUDAL ONCE
Status: DISCONTINUED | OUTPATIENT
Start: 2019-01-15 | End: 2019-01-15 | Stop reason: SDUPTHER

## 2019-01-15 RX ORDER — METOCLOPRAMIDE HYDROCHLORIDE 5 MG/ML
10 INJECTION INTRAMUSCULAR; INTRAVENOUS
Status: COMPLETED | OUTPATIENT
Start: 2019-01-15 | End: 2019-01-15

## 2019-01-15 RX ORDER — VALPROATE SODIUM 100 MG/ML
500 INJECTION INTRAVENOUS
Status: DISCONTINUED | OUTPATIENT
Start: 2019-01-15 | End: 2019-01-15 | Stop reason: SDUPTHER

## 2019-01-15 RX ORDER — HYDROMORPHONE HYDROCHLORIDE 2 MG/ML
1 INJECTION, SOLUTION INTRAMUSCULAR; INTRAVENOUS; SUBCUTANEOUS
Status: COMPLETED | OUTPATIENT
Start: 2019-01-15 | End: 2019-01-15

## 2019-01-15 RX ORDER — DEXAMETHASONE SODIUM PHOSPHATE 100 MG/10ML
10 INJECTION INTRAMUSCULAR; INTRAVENOUS
Status: COMPLETED | OUTPATIENT
Start: 2019-01-15 | End: 2019-01-15

## 2019-01-15 RX ORDER — BUPIVACAINE HYDROCHLORIDE 5 MG/ML
10 INJECTION, SOLUTION EPIDURAL; INTRACAUDAL ONCE
Status: COMPLETED | OUTPATIENT
Start: 2019-01-15 | End: 2019-01-15

## 2019-01-15 RX ORDER — ONDANSETRON 4 MG/1
4 TABLET, ORALLY DISINTEGRATING ORAL
Qty: 11 TAB | Refills: 1 | Status: SHIPPED | OUTPATIENT
Start: 2019-01-15 | End: 2021-03-18

## 2019-01-15 RX ORDER — SODIUM CHLORIDE 9 MG/ML
1000 INJECTION, SOLUTION INTRAVENOUS ONCE
Status: COMPLETED | OUTPATIENT
Start: 2019-01-15 | End: 2019-01-15

## 2019-01-15 RX ORDER — MAGNESIUM SULFATE HEPTAHYDRATE 40 MG/ML
2 INJECTION, SOLUTION INTRAVENOUS
Status: COMPLETED | OUTPATIENT
Start: 2019-01-15 | End: 2019-01-15

## 2019-01-15 RX ORDER — DIVALPROEX SODIUM 250 MG/1
250 TABLET, DELAYED RELEASE ORAL 2 TIMES DAILY
Qty: 20 TAB | Refills: 0 | Status: SHIPPED | OUTPATIENT
Start: 2019-01-15 | End: 2019-01-25

## 2019-01-15 RX ADMIN — HYDROMORPHONE HYDROCHLORIDE 1 MG: 2 INJECTION, SOLUTION INTRAMUSCULAR; INTRAVENOUS; SUBCUTANEOUS at 14:39

## 2019-01-15 RX ADMIN — MAGNESIUM SULFATE IN WATER 2 G: 40 INJECTION, SOLUTION INTRAVENOUS at 13:04

## 2019-01-15 RX ADMIN — DEXAMETHASONE SODIUM PHOSPHATE 10 MG: 10 INJECTION INTRAMUSCULAR; INTRAVENOUS at 12:57

## 2019-01-15 RX ADMIN — SODIUM CHLORIDE 1000 ML: 900 INJECTION, SOLUTION INTRAVENOUS at 12:56

## 2019-01-15 RX ADMIN — VALPROATE SODIUM 500 MG: 100 INJECTION, SOLUTION INTRAVENOUS at 15:02

## 2019-01-15 RX ADMIN — BUPIVACAINE HYDROCHLORIDE 50 MG: 5 INJECTION, SOLUTION EPIDURAL; INTRACAUDAL; PERINEURAL at 14:00

## 2019-01-15 RX ADMIN — METOCLOPRAMIDE 10 MG: 5 INJECTION, SOLUTION INTRAMUSCULAR; INTRAVENOUS at 12:57

## 2019-01-15 NOTE — ED NOTES
I have reviewed discharge instructions with the patient. The patient verbalized understanding. Patient left ED via Discharge Method: ambulatory to Home with daughter. Opportunity for questions and clarification provided. Patient given 2 scripts. To continue your aftercare when you leave the hospital, you may receive an automated call from our care team to check in on how you are doing. This is a free service and part of our promise to provide the best care and service to meet your aftercare needs.  If you have questions, or wish to unsubscribe from this service please call [phone]. Thank you for Choosing our [facility] Emergency Department.

## 2019-01-15 NOTE — ED PROVIDER NOTES
[address] [place] [address] Emergency Department Lori Knapp is a 63 y.o. female seen on 1/15/2019 at 12:10 PM in the [address] [place] [address] in room ER07/07. Chief Complaint Patient presents with  
 Headache HPI:  
71-year-old female presents to the emergency department with headache. General body aches. She was admitted in mid December for headache neck pain. She underwent a TIA/CVA evaluation. Had negative CT of the head negative MRI of the brain. Negative MRA of the head and neck. She had a tele-neurology evaluation The only finding was mild sphenoid sinusitis. She is followed up with ENT performed endoscopy of the nose. There was no drainage from the sinus. She is scheduled to follow up with neurology but has been unable to make that up appointment yet She comes in today stating she feels terrible. She is unable to get up or move. Having nausea. Persistent headache. Worse with light. Worse with noise. Throbbing in nature Denies any history of migraine. No fever. No recent trauma. She does report multiple work-related injuries Review of Systems: Review of Systems Constitutional: Negative for activity change, appetite change, chills and fever. HENT: Negative. Respiratory: Negative. Negative for shortness of breath and wheezing. Cardiovascular: Negative. Gastrointestinal: Negative. Genitourinary: Negative. Skin: Negative. Neurological: Positive for light-headedness and headaches. Negative for dizziness, tremors, seizures, facial asymmetry, speech difficulty and numbness. Psychiatric/Behavioral: Negative. Past Medical History: Primary Care Doctor: Tariq Moreno NP Past Medical History:  
Diagnosis Date  Arthritis  Gastrointestinal disorder   
 diverticulitis-gerd  Other ill-defined conditions(158.47) Chronic back,neck,hip pain.  Other unknown and unspecified cause of morbidity or mortality   
 bells palsy  Psychiatric disorder   
 anxiety Past Surgical History:  
Procedure Laterality Date  HX CHOLECYSTECTOMY  HX HYSTERECTOMY  HX ORTHOPAEDIC    
 neck x2  NEUROLOGICAL PROCEDURE UNLISTED    
 cervical fusion Social History Socioeconomic History  Marital status:  Spouse name: Not on file  Number of children: Not on file  Years of education: Not on file  Highest education level: Not on file Tobacco Use  Smoking status: Never Smoker Substance and Sexual Activity  Alcohol use: No  
 Drug use: No  
 Sexual activity: Not Currently Prior to Admission Medications Prescriptions Last Dose Informant Patient Reported? Taking? OXYCODONE HCL/ACETAMINOPHEN (PERCOCET PO)   Yes No  
Sig: Take 10 mg by mouth every eight (8) hours as needed for Pain. ZOLPIDEM TARTRATE (AMBIEN PO)   Yes No  
Sig: Take 10 mg by mouth nightly. celecoxib (CELEBREX) 200 mg capsule   Yes No  
Sig: Take 200 mg by mouth two (2) times a day. citalopram (CELEXA) 20 mg tablet   Yes No  
Sig: Take 20 mg by mouth daily. doxycycline (VIBRAMYCIN) 100 mg capsule   No No  
Sig: Take 1 Cap by mouth every twelve (12) hours. fluticasone (FLONASE) 50 mcg/actuation nasal spray   No No  
Si Sprays by Both Nostrils route daily. gabapentin (NEURONTIN) 300 mg capsule   Yes No  
Sig: Take 300 mg by mouth three (3) times daily. guaiFENesin ER (MUCINEX) 600 mg ER tablet   No No  
Sig: Take 1 Tab by mouth every twelve (12) hours. pantoprazole (PROTONIX) 40 mg tablet   Yes No  
Sig: Take 40 mg by mouth daily. predniSONE (DELTASONE) 20 mg tablet   No No  
Sig: Take 40 mg by mouth daily (with breakfast). predniSONE (DELTASONE) 20 mg tablet   No No  
Sig: Take 40 mg by mouth daily (with breakfast) for 7 days. Facility-Administered Medications: None Allergies Allergen Reactions  Pcn [Penicillins] Anaphylaxis  Aspirin Other (comments) Physical Exam:  Nursing documentation reviewed. Vitals:  
 01/15/19 1145 BP: 155/89 Pulse: (!) 107 Resp: 16 Temp: 98 °F (36.7 °C) SpO2: 100% Vital signs were reviewed. Physical Exam  
Constitutional: She is oriented to person, place, and time. She appears well-developed and well-nourished. HENT:  
Head: Normocephalic and atraumatic. Eyes: Pupils are equal, round, and reactive to light. Cardiovascular: Normal rate and regular rhythm. Pulmonary/Chest: Breath sounds normal. No stridor. No respiratory distress. Abdominal: Bowel sounds are normal. She exhibits no distension. There is no tenderness. Musculoskeletal: She exhibits tenderness. She exhibits no edema or deformity. Arms: 
Neurological: She is alert and oriented to person, place, and time. Tongue And facial movements are intact. Extraocular movements are intact. Skin: Skin is warm and dry. Psychiatric:  
Patient is very sensitive to light touch. Palpation of her arms back and neck elicits significant pain response. Nursing note and vitals reviewed. MEDICAL DECISION MAKING: 
MDM Number of Diagnoses or Management Options Diagnosis management comments: 60-year-old female with persistent headache and is Worse. States causing photophobia photophobia and throbbing in nature. Making it difficult to get daily activities done Sounds like a vascular/migraine headache. She is given medications for the same She had an extensive workup all she was in the hospital.  LP was not performed. We'll attempt one here today to evaluate further. Amount and/or Complexity of Data Reviewed Clinical lab tests: ordered and reviewed Decide to obtain previous medical records or to obtain history from someone other than the patient: yes Review and summarize past medical records: yes 
 
   
_________________________________________________________ 
ED Evaluation Radiology studies performed: No orders to display reviewed CT MRI MRA results from her previous admission. No significant findings Orders Placed This Encounter  LUMBAR PUNCTURE (BEDSIDE), DIAGNOSTIC  
 NURSING-MISCELLANEOUS: cool, wet washcloth ONE TIME  
 NURSING-MISCELLANEOUS: Darken Room ONE TIME  
 OXYGEN VIA NON-REBREATHER MASK  metoclopramide HCl (REGLAN) injection 10 mg  
 dexamethasone (DECADRON) injection 10 mg  
 0.9% sodium chloride infusion 1,000 mL  DISCONTD: bupivacaine (PF) (MARCAINE) 0.25 % (2.5 mg/mL) injection 25 mg  
 magnesium sulfate 2 g/50 ml IVPB (premix or compounded)  bupivacaine (PF) (MARCAINE) 0.5 % (5 mg/mL) injection 50 mg  
 DISCONTD: valproate (DEPACON) 500 mg/5 mL (100 mg/mL) injection 500 mg  
 HYDROmorphone (PF) (DILAUDID) injection 1 mg  
 valproate (DEPACON) 500 mg in 0.9% sodium chloride 50 mL IVPB Medications  
valproate (DEPACON) 500 mg in 0.9% sodium chloride 50 mL IVPB (not administered) metoclopramide HCl (REGLAN) injection 10 mg (10 mg IntraVENous Given 1/15/19 1257) dexamethasone (DECADRON) injection 10 mg (10 mg IntraVENous Given 1/15/19 1257)  
0.9% sodium chloride infusion 1,000 mL (1,000 mL IntraVENous New Bag 1/15/19 1256) magnesium sulfate 2 g/50 ml IVPB (premix or compounded) (2 g IntraVENous New Bag 1/15/19 1304) bupivacaine (PF) (MARCAINE) 0.5 % (5 mg/mL) injection 50 mg (50 mg Peripheral Nerve Block Given 1/15/19 1400) HYDROmorphone (PF) (DILAUDID) injection 1 mg (1 mg IntraVENous Given 1/15/19 1439)  
 
________________________________________________________ LUMBAR PUNCTURE (BEDSIDE), DIAGNOSTIC Date/Time: 1/15/2019 2:49 PM 
Performed by: Romario Zamarripa MD 
Authorized by: Romario Zamarripa MD  
 
Consent:  
  Consent obtained:  Verbal and written Consent given by:  Patient Risks discussed:  Bleeding, headache, infection and pain Alternatives discussed:  No treatment Pre-procedure details:  
  Procedure purpose:  Diagnostic Preparation: Patient was prepped and draped in usual sterile fashion Anesthesia (see MAR for exact dosages): Anesthesia method:  Local infiltration Local anesthetic:  Lidocaine 1% w/o epi Procedure details:  
  Lumbar space:  L4-L5 interspace Patient position:  Sitting Needle gauge:  22 Needle type:  Spinal needle - Quincke tip Needle length (in):  3.5 Number of attempts:  3 Post-procedure:  
  Patient tolerance of procedure:  Procedure terminated at patient's request 
Comments:  
   Unable to obtain spinal fluid. Procedure was terminated. 
 
===================================================== 
ASSESSMENT: on recheck at that time an LP patient's headache had improved from a 10 out of 10 to a 7 out of 10. We'll give her a dose of opiate rescue medications as well as IV valproate Have her follow up with neurology Dispo/Plan:    home Condition:  improved Diagnosis:     Headache, likely migraine Tj Shaikh MD; 1/15/2019 @12:10 PM================================

## 2019-01-15 NOTE — ED NOTES
LUMBAR PUNCTURE: 
 
Time out is 1405. In attendance was Dr. Altagracia Katz and primary RN C. Lan Oppenheim. Procedure started at [phone]. Pt repositioned at 1411. Procedure was not successful but ended at 1422. Pt made as comfortable as possible.

## 2019-01-15 NOTE — ED TRIAGE NOTES
Pt reports headache for almost 2 months. Pt states she was seen for same and is having pain to back and neck as well. Pt states she has been unable to see neurologist yet.  
 
Juan Ramon Mckee RN

## 2019-02-05 ENCOUNTER — HOSPITAL ENCOUNTER (OUTPATIENT)
Dept: MRI IMAGING | Age: 59
End: 2019-02-05
Attending: PSYCHIATRY & NEUROLOGY
Payer: COMMERCIAL

## 2019-02-05 ENCOUNTER — HOSPITAL ENCOUNTER (OUTPATIENT)
Dept: MRI IMAGING | Age: 59
Discharge: HOME | End: 2019-02-05
Attending: PSYCHIATRY & NEUROLOGY
Payer: COMMERCIAL

## 2019-02-05 DIAGNOSIS — H57.12 PAIN OF LEFT EYE: ICD-10-CM

## 2019-02-05 DIAGNOSIS — H05.20 PROPTOSIS: ICD-10-CM

## 2019-02-05 PROCEDURE — 74011250636 HC RX REV CODE- 250/636: Performed by: PSYCHIATRY & NEUROLOGY

## 2019-02-05 PROCEDURE — 70543 MRI ORBT/FAC/NCK W/O &W/DYE: CPT

## 2019-02-05 PROCEDURE — A9575 INJ GADOTERATE MEGLUMI 0.1ML: HCPCS | Performed by: PSYCHIATRY & NEUROLOGY

## 2019-02-05 RX ORDER — SODIUM CHLORIDE 0.9 % (FLUSH) 0.9 %
10 SYRINGE (ML) INJECTION
Status: COMPLETED | OUTPATIENT
Start: 2019-02-05 | End: 2019-02-05

## 2019-02-05 RX ORDER — GADOTERATE MEGLUMINE 376.9 MG/ML
18 INJECTION INTRAVENOUS
Status: COMPLETED | OUTPATIENT
Start: 2019-02-05 | End: 2019-02-05

## 2019-02-05 RX ADMIN — Medication 10 ML: at 18:39

## 2019-02-05 RX ADMIN — GADOTERATE MEGLUMINE 18 ML: 376.9 INJECTION INTRAVENOUS at 18:39

## 2019-03-26 ENCOUNTER — HOSPITAL ENCOUNTER (OUTPATIENT)
Dept: PHYSICAL THERAPY | Age: 59
Discharge: HOME | End: 2019-03-26
Payer: COMMERCIAL

## 2019-03-26 PROCEDURE — 97162 PT EVAL MOD COMPLEX 30 MIN: CPT

## 2019-03-26 NOTE — THERAPY EVALUATION
Steph Solis  : 1960  Primary: [employer] [facility] [employer]  Secondary:  [address]  at [facility] PRASHANT RAO  [address] [facility], [address] [place] [address],[address], [facility] [address]  [phone]   [phone]         Lanette Espinal Assessment 3/26/2019   ICD-10: Treatment Diagnosis: Low back pain (M54.5), Cervicalgia (M54.2)  Precautions/Allergies: per EMR allergic to PCN and aspirin  MD Orders: Eval and treat, massage therapy MEDICAL/REFERRING DIAGNOSIS:  Low back pain [M54.5]  Radiculopathy, cervical region [M54.12]    DATE OF ONSET: 2007  REFERRING PHYSICIAN: Luis Carlos [facility]: 2019     INITIAL ASSESSMENT:  Ms. Atul Anderson is a 63year old R hand dominant female who has been to this clinic before for aquatic PT. She presents with decreased cervical ROM, and decreased lumbar ROM but functional movement was noted to be greater when she leaned over to  purse off floor than it was when formally tested. She was extremely weak in all resistance testing, but was able to  purse and water bottle, and was able to ambulate into clinic without assistive device.  strength was tested with dynamometer as about 3 psi in each hand, but patient drove herself to clinic. Inconsistencies made this evaluation difficulty. Patient may benefit from PT to address deficits, but prognosis is guarded due to it was unclear how much of a deficit there was. PROBLEM LIST (Impacting functional limitations):  1. Decreased Strength  2. Decreased ADL/Functional Activities  3. Increased Pain  4. Decreased Flexibility/Joint Mobility INTERVENTIONS PLANNED: (Treatment may consist of any combination of the following)  1. Home Exercise Program (HEP)  2. Manual Therapy  3. Therapeutic Exercise/Strengthening  4. aquatic PT   TREATMENT PLAN:  Effective Dates: 3/26/2019 TO 2019 (90 days).   Frequency/Duration: 1-2 times a week for 90 Day(s)  GOALS: (Goals have been discussed and agreed upon with patient.)  Patient's stated goal is to \"Feel better\"   Short-Term Functional Goals: Time Frame: 6 weeks  1. Patient to be independent with HEP  2. Patient to tolerate 45 minute aquatic PT sessions  3. Patient to report decrease in overall pain to 5/10  Discharge Goals: Time Frame: 90 days  1. Patient to report decrease in back and neck pain to 3/10  2. Patient to improve Oswestry score from 35 to 20  3. Patient to improve NDI score from 31 to 20  Rehabilitation Potential For Stated Goals: guarded  Regarding Patricia iLmon's therapy, I certify that the treatment plan above will be carried out by a therapist or under their direction. Thank you for this referral,  Lit Garcia PT                  The information in this section was collected on 3/26/19 (except where otherwise noted). HISTORY:   History of Present Injury/Illness (Reason for Referral):  Patient reports she has \"the same problem as before\". Severe neck and back pain starting in 2007 with a couple of subsequent neck surgeries. Reports she had an MRI about a year ago and may need another surgery. Aquatic PT helped in the past and she thinks she was sent to try it again. Past Medical History/Comorbidities: From patient and EMR: Arthritis, Gastrointestinal disorder, Psychiatric disorder, cholecystectomy; hysterectomy; 2 neck surgeries and pr neurological procedure unlisted. Social History/Living Environment:     lives alone in 2 story home  Prior Level of Function/Work/Activity:  Not working. Dominant Side:         RIGHT     Ambulatory/Rehab Services H2 Model Falls Risk Assessment    Risk Factors:       (1)  Any administered benzodiazepines Ability to Rise from Chair:       (1)  Pushes up, successful in one attempt    Falls Prevention Plan:       No modifications necessary   Total: (5 or greater = High Risk): 2    ©2010 [facility] of [employer]. All Rights Reserved. [facility] Center[facility] States Patent #[id number].  [facility] Law prohibits the replication, distribution or use without written permission from [facility]     Current Medications:  Xtampza, Percocet, Celexa, Fleseril, Gabapentin, Buspirone, Bio-freeze, Pantoprzole   Date Last Reviewed:  3/26/19   Number of Personal Factors/Comorbidities that affect the Plan of Care: 1-2: MODERATE COMPLEXITY   EXAMINATION:   Observation/Orthostatic Postural Assessment:          Patient wore sunglasses and hat throughout entire evaluation. Forward head posture. ROM:          Lumbar AROM flex 25%, extn 10%, rotation B 25%, sidebend B 25%        Cervical AROM flex 25%, extn 10%, rotation B 25%, sidebend B 10%        However patient was noted to sit down and lean over to  purse and water bottle off the floor at end of session  Strength:          No resistance provided with any strength testing.  strength using dynamometer was 4,2,2 psi on 3 trials on R,  3,2,2 psi on 3 trial on L. However patient was able to  purse and water bottle and hold them. Neurological Screen:        Sensation: dulled light touch on L UE. Body Structures Involved:  1. Joints  2. Muscles Body Functions Affected:  1. Neuromusculoskeletal  2. Movement Related Activities and Participation Affected:  1. General Tasks and Demands  2. Mobility   Number of elements (examined above) that affect the Plan of Care: 3: MODERATE COMPLEXITY   CLINICAL PRESENTATION:   Presentation: Evolving clinical presentation with changing clinical characteristics: MODERATE COMPLEXITY   CLINICAL DECISION MAKING:   Outcome Measure: Tool Used: Neck Disability Index (NDI)  Score:  Initial: 31/50  Most Recent: X/50 (Date: -- )   Interpretation of Score: The Neck Disability Index is a revised form of the Oswestry Low Back Pain Index and is designed to measure the activities of daily living in person's with neck pain. Each section is scored on a 0-5 scale, 5 representing the greatest disability.   The scores of each section are added together for a total score of 50. Tool Used: Modified Oswestry Low Back Pain Questionnaire  Score:  Initial:35/50  Most Recent: X/50 (Date: -- )   Interpretation of Score: Each section is scored on a 0-5 scale, 5 representing the greatest disability. The scores of each section are added together for a total score of 50. Medical Necessity:   · Patient demonstrates guarded rehab potential due to higher previous functional level. She has had PT in the past  Reason for Services/Other Comments:  · Patient continues to require skilled intervention due to patient desires to see if aquatic PT can decrease pain. Use of outcome tool(s) and clinical judgement create a POC that gives a: Difficult prediction of patient's progress: HIGH COMPLEXITY            TREATMENT:   (In addition to Assessment/Re-Assessment sessions the following treatments were rendered)  Pre-treatment Symptoms/Complaints:  Pain   Pain: Initial:   Pain Intensity 1: 8('All over my body')   Post Session:  No change in pain noted by patient     No treatment today. Evaluation only. Treatment/Session Assessment:    · Response to Treatment:  No change in pain level noted. · Compliance with Program/Exercises: Will assess as treatment progresses. · Recommendations/Intent for next treatment session: \"Next visit will focus on beginning aquatic PT. \".   Total Treatment Duration:  PT Patient Time In/Time Out  Time In: 1410  Time Out: [address]

## 2019-04-25 ENCOUNTER — APPOINTMENT (OUTPATIENT)
Dept: PHYSICAL THERAPY | Age: 59
End: 2019-04-25

## 2019-04-30 ENCOUNTER — APPOINTMENT (OUTPATIENT)
Dept: PHYSICAL THERAPY | Age: 59
End: 2019-04-30

## 2019-05-01 ENCOUNTER — HOSPITAL ENCOUNTER (OUTPATIENT)
Dept: PHYSICAL THERAPY | Age: 59
Discharge: HOME | End: 2019-05-01
Payer: COMMERCIAL

## 2019-05-01 PROCEDURE — 97113 AQUATIC THERAPY/EXERCISES: CPT

## 2019-05-01 NOTE — PROGRESS NOTES
Delmon Moritz  : 1960  Payor: Tresa Curtis / Plan: [place] [employer] / Product Type: Workers Comp /  [address]  at [facility] PRASHANT RAO  [address] [facility], [address] [place] [address] [address], [place] [address]  [phone]   [phone]       OUTPATIENT PHYSICAL THERAPY: Daily Treatment Note 2019     ICD-10: Treatment Diagnosis: Low back pain (M54.5), Cervicalgia (M54.2)  Precautions/Allergies:   Pcn [penicillins] and Aspirin   MD Orders: Evaluate and treat massage therapy MEDICAL/REFERRING DIAGNOSIS:  Low back pain [M54.5]  Radiculopathy, cervical region [M54.12]   DATE OF ONSET: 2007  REFERRING PHYSICIAN: Luis Carlos [address] [place] [address] Street: May 8,19       Pre-treatment Symptoms/Complaints:  Pt states she was supposed to have surgery on her neck but she keeps postponing it. She is concerned about the  Forward bending of her thoracic spine. She complains of burning and stabbing pain. She has had 2 neck surgeries previously. Also complaining of R hip pain which recently had an injection. She states she was hospitalized in Dec for head and neck pain. Pain: Initial:   8/10 in her back Post Session:  10/10   Medications Last Reviewed:  2019    Updated Objective Findings:   None Today     TREATMENT:     Aquatic Therapy (45  minutes): Aquatic treatment performed per flow grid for Decreased muscle strength, Decreased range of motion, Decreased activity endurance, Decompression, Ease of movement and Low impact and reduced weight bearing activity. Cues provided for posture, ex and gait. Assistance by therapist provided for deep water ex.       Aquatic Exercise Log       Date  19 Date   Date   Date   Date     Activity/ Exercise Parameters Parameters Parameters Parameters Parameters   Walking forward 6       Walking backward 6       Walking sideways 6         Marching 6         Goose Step 6         Tip toes 3         Heels 3         Lunges        Side step squats        LE Exercises          Hip Flex/Ext 10         Hip Abd/Add 10         Hip IR/ER          Calf raises          Knee Flex          Squats          Leg Circles 10/10         Step Ups 10       UE Exercises          Squeeze In 10         Butterfly 10         Shoulder rolls 10         Bicep/Tricep        Rows/Press outs         Chi Positions          Trunk Rotation        Deep H2O/ Noodles          Stabilization          Arms only          Legs only        Fredi Segundo walk        Lower abdominal   work           Cardio          Jogging        Lap   Swimming          Stretches          Hamstrings          Heelcords          Piriformis          Neck            HEP: working on [employer] currently. [employer]    Treatment/Session Summary:    · Response to Treatment:  Pt able to move  better in the pool but was hurting more after as she said she had not moved much today. .  · Communication/Consultation:  None today  · Equipment provided today:  None today  · Recommendations/Intent for next treatment session: Next visit will focus on continuing aquatics. .    Treatment Plan of Care Effective Dates:  3/26/19 to 6/24/19. Frequency/Duration: 1-2/week.      Visit Count:  2    Total Treatment Billable Duration:  45 min       Alicia Mensah PT    Future Appointments   Date Time Provider [place]   5/8/2019  3:15 PM Robert Jimenezs, PT [facility]   5/15/2019  1:45 PM Marzena Donato, PT [facility]   5/21/2019  1:45 PM Viri Rogers, PTA [facility]   5/23/2019  1:45 PM Viri Rogers, PTA [facility]   5/28/2019  1:45 PM Viri Rogers, PTA [facility]   5/30/2019  1:45 PM Taylor Dooley, PTA [facility]

## 2019-05-02 ENCOUNTER — APPOINTMENT (OUTPATIENT)
Dept: PHYSICAL THERAPY | Age: 59
End: 2019-05-02
Payer: COMMERCIAL

## 2019-05-07 ENCOUNTER — APPOINTMENT (OUTPATIENT)
Dept: PHYSICAL THERAPY | Age: 59
End: 2019-05-07
Payer: COMMERCIAL

## 2019-05-09 ENCOUNTER — APPOINTMENT (OUTPATIENT)
Dept: PHYSICAL THERAPY | Age: 59
End: 2019-05-09
Payer: COMMERCIAL

## 2019-05-14 ENCOUNTER — APPOINTMENT (OUTPATIENT)
Dept: PHYSICAL THERAPY | Age: 59
End: 2019-05-14
Payer: COMMERCIAL

## 2019-05-15 ENCOUNTER — HOSPITAL ENCOUNTER (OUTPATIENT)
Dept: PHYSICAL THERAPY | Age: 59
Discharge: HOME | End: 2019-05-15
Payer: COMMERCIAL

## 2019-05-15 PROCEDURE — 97113 AQUATIC THERAPY/EXERCISES: CPT

## 2019-05-15 NOTE — PROGRESS NOTES
Shad Stevephill  : 1960  Payor: Jonas Walker / [place]: [place] [employer] / Product Type: Workers Comp /  [address]  at [facility] PRASHANT RAO  [address] [facility], [address] [facility] [address], [address]  [phone]   [phone]       OUTPATIENT PHYSICAL THERAPY: Daily Treatment Note 5/15/2019     ICD-10: Treatment Diagnosis: Low back pain (M54.5), Cervicalgia (M54.2)  Precautions/Allergies:   Pcn [penicillins] and Aspirin   MD Orders: Evaluate and treat massage therapy MEDICAL/REFERRING DIAGNOSIS:  Low back pain [M54.5]  Radiculopathy, cervical region [M54.12]   DATE OF ONSET: 2007  REFERRING PHYSICIAN: Luis Carlos [address] [place] [address] Street: May 8,19       Pre-treatment Symptoms/Complaints:  Pt states her pain is an 8/10 today. Back and hips- she states when she standing she gets more pain. Pain: Initial:   8/10 in her back Post Session:  8/10   Medications Last Reviewed:  5/15/2019    Updated Objective Findings:   None Today     TREATMENT:     Aquatic Therapy (45  minutes): Aquatic treatment performed per flow grid for Decreased muscle strength, Decreased range of motion, Decreased activity endurance, Decompression, Ease of movement and Low impact and reduced weight bearing activity. Cues provided for posture, ex and gait. Assistance by therapist provided for deep water ex.       Aquatic Exercise Log       Date  19 Date  5/15 Date   Date   Date     Activity/ Exercise Parameters Parameters Parameters Parameters Parameters   Walking forward 6 6      Walking backward 6 6      Walking sideways 6 6        Marching 6 6        Goose Step 6 6        Tip toes 3 3        Heels 3 3        Lunges        Side step squats        LE Exercises  4#        Hip Flex/Ext 10 10        Hip Abd/Add 10 10        Hip IR/ER          Calf raises          Knee Flex  10        Squats          Leg Circles 10/10 10/10        Step Ups 10 10      UE Exercises          Squeeze In 10         Butterfly 10 Shoulder rolls 10         Bicep/Tricep        Rows/Press outs         Chi Positions          Trunk Rotation        Deep H2O/ Noodles          Stabilization          Arms only          Legs only        Fredi Segundo walk        Lower abdominal   work           Cardio          Jogging        Lap   Swimming          Stretches          Hamstrings          Heelcords          Piriformis          Neck            HEP: working on [employer] currently. [employer]    Treatment/Session Summary:    · Response to Treatment:  Pt has very slow and limited motion. She stated she hurt for several days after last session. Will continue with a trial of aquatics. .  · Communication/Consultation:  None today  · Equipment provided today:  None today  · Recommendations/Intent for next treatment session: Next visit will focus on continuing aquatics. .    Treatment Plan of Care Effective Dates:  3/26/19 to 6/24/19. Frequency/Duration: 1-2/week.      Visit Count:  3    Total Treatment Billable Duration:  45 min  PT Patient Time In/Time Out  Time In: 0145  Time Out: 0230    Jose Elias Chamberlain PT    Future Appointments   Date Time Provider [place]   5/21/2019  1:45 PM Javan Fry [facility]   5/23/2019  1:45 PM Javan Fry PTA [facility]   5/28/2019  1:45 PM Javan Fry PTA [facility]   5/30/2019  1:45 PM Alondra Dooley PTA [facility]

## 2019-05-16 ENCOUNTER — APPOINTMENT (OUTPATIENT)
Dept: PHYSICAL THERAPY | Age: 59
End: 2019-05-16
Payer: COMMERCIAL

## 2019-05-21 ENCOUNTER — APPOINTMENT (OUTPATIENT)
Dept: PHYSICAL THERAPY | Age: 59
End: 2019-05-21
Payer: COMMERCIAL

## 2019-05-23 ENCOUNTER — APPOINTMENT (OUTPATIENT)
Dept: PHYSICAL THERAPY | Age: 59
End: 2019-05-23
Payer: COMMERCIAL

## 2019-05-28 ENCOUNTER — APPOINTMENT (OUTPATIENT)
Dept: PHYSICAL THERAPY | Age: 59
End: 2019-05-28
Payer: COMMERCIAL

## 2019-05-28 ENCOUNTER — HOSPITAL ENCOUNTER (OUTPATIENT)
Dept: PHYSICAL THERAPY | Age: 59
Discharge: HOME | End: 2019-05-28
Payer: COMMERCIAL

## 2019-05-28 NOTE — PROGRESS NOTES
Pt called to cancel today but did not give a reason why. Will follow up with her on the next visit.      Seamus Adan, PTA

## 2019-05-30 ENCOUNTER — APPOINTMENT (OUTPATIENT)
Dept: PHYSICAL THERAPY | Age: 59
End: 2019-05-30
Payer: COMMERCIAL

## 2019-05-30 ENCOUNTER — HOSPITAL ENCOUNTER (OUTPATIENT)
Dept: PHYSICAL THERAPY | Age: 59
Discharge: HOME | End: 2019-05-30
Payer: COMMERCIAL

## 2019-05-30 PROCEDURE — 97113 AQUATIC THERAPY/EXERCISES: CPT

## 2019-05-30 NOTE — PROGRESS NOTES
Jc Lindquist  : 1960  Payor: Keisha Jin / Plan: [place] [employer] / Product Type: Workers Comp /  [address]  at [facility] PRASHANT RAO  [address] [facility], [address] [place] [address] [address], [facility] [address].  [phone]   [phone]       OUTPATIENT PHYSICAL THERAPY: Daily Treatment Note 2019     ICD-10: Treatment Diagnosis: Low back pain (M54.5), Cervicalgia (M54.2)  Precautions/Allergies:   Pcn [penicillins] and Aspirin   MD Orders: Evaluate and treat massage therapy MEDICAL/REFERRING DIAGNOSIS:  Low back pain [M54.5]  Radiculopathy, cervical region [M54.12]   DATE OF ONSET: 2007  REFERRING PHYSICIAN: Harmony Aldridge[address] [place] [address] Street: May 8,19       Pre-treatment Symptoms/Complaints:  Pt states she is still having back and neck pain. Pain: Initial:   not rated Post Session:  Not rated   Medications Last Reviewed:  2019    Updated Objective Findings:   None Today     TREATMENT:     Aquatic Therapy (45  minutes): Aquatic treatment performed per flow grid for Decreased muscle strength, Decreased range of motion, Decreased activity endurance, Decompression, Ease of movement and Low impact and reduced weight bearing activity. Cues provided for posture, ex and gait. Assistance by therapist provided for deep water ex.       Aquatic Exercise Log       Date  19 Date  5/15 Date  19 Date   Date     Activity/ Exercise Parameters Parameters Parameters Parameters Parameters   Walking forward 6 6 6     Walking backward 6 6 6     Walking sideways 6 6 6       Marching 6 6 6       Goose Step 6 6 6       Tip toes 3 3 3       Heels 3 3 3       Lunges        Side step squats        LE Exercises  4# 4.5'       Hip Flex/Ext 10 10 15       Hip Abd/Add 10 10 15       Hip IR/ER          Calf raises   15       Knee Flex  10 15       Squats          Leg Circles 10/10 10/10 15/15       Step Ups 10 10 15     UE Exercises          Squeeze In 10         Butterfly 10         Shoulder rolls 10         Bicep/Tricep        Rows/Press outs         Chi Positions          Trunk Rotation        Deep H2O/ Noodles   7' with blue rings       Stabilization          Arms only          Legs only   Bicycle 3 min     Cross   Country   3 min       Scissors   3 min       Crab walk   DKTC 3 min     Lower abdominal   work           Cardio          Jogging        Lap   Swimming          Stretches          Hamstrings          Heelcords          Piriformis          Neck            HEP: working on [employer] currently. [employer]    Treatment/Session Summary:    · Response to Treatment:  Pt tolerated the water well today. .  · Communication/Consultation:  None today  · Equipment provided today:  None today  · Recommendations/Intent for next treatment session: Next visit will focus on continuing aquatics. .    Treatment Plan of Care Effective Dates:  3/26/19 to 6/24/19. Frequency/Duration: 1-2/week. Visit Count:  4    Total Treatment Billable Duration:  45 min  PT Patient Time In/Time Out  Time In: 9678  Time Out: [facility], [facility]    No future appointments.

## 2019-09-30 ENCOUNTER — HOSPITAL ENCOUNTER (EMERGENCY)
Age: 59
Discharge: HOME | End: 2019-09-30
Attending: EMERGENCY MEDICINE
Payer: SELF-PAY

## 2019-09-30 ENCOUNTER — APPOINTMENT (OUTPATIENT)
Dept: GENERAL RADIOLOGY | Age: 59
End: 2019-09-30
Attending: EMERGENCY MEDICINE
Payer: SELF-PAY

## 2019-09-30 VITALS
OXYGEN SATURATION: 97 % | DIASTOLIC BLOOD PRESSURE: 61 MMHG | HEIGHT: 66 IN | WEIGHT: 180 LBS | TEMPERATURE: 98.5 F | BODY MASS INDEX: 28.93 KG/M2 | HEART RATE: 91 BPM | SYSTOLIC BLOOD PRESSURE: 102 MMHG | RESPIRATION RATE: 16 BRPM

## 2019-09-30 DIAGNOSIS — R07.9 CHEST PAIN, UNSPECIFIED TYPE: Primary | ICD-10-CM

## 2019-09-30 LAB
ALBUMIN SERPL-MCNC: 3.4 G/DL (ref 3.5–5)
ALBUMIN/GLOB SERPL: 0.8 {RATIO} (ref 1.2–3.5)
ALP SERPL-CCNC: 121 U/L (ref 50–136)
ALT SERPL-CCNC: 27 U/L (ref 12–65)
ANION GAP SERPL CALC-SCNC: ABNORMAL MMOL/L (ref 7–16)
AST SERPL-CCNC: 16 U/L (ref 15–37)
ATRIAL RATE: 96 BPM
BASOPHILS # BLD: 0.1 K/UL (ref 0–0.2)
BASOPHILS NFR BLD: 1 % (ref 0–2)
BILIRUB SERPL-MCNC: 0.3 MG/DL (ref 0.2–1.1)
BUN SERPL-MCNC: 22 MG/DL (ref 6–23)
CALCIUM SERPL-MCNC: 8.6 MG/DL (ref 8.3–10.4)
CALCULATED P AXIS, ECG09: 41 DEGREES
CALCULATED R AXIS, ECG10: -7 DEGREES
CALCULATED T AXIS, ECG11: -9 DEGREES
CHLORIDE SERPL-SCNC: 110 MMOL/L (ref 98–107)
CO2 SERPL-SCNC: 27 MMOL/L (ref 21–32)
CREAT SERPL-MCNC: 1.09 MG/DL (ref 0.6–1)
D DIMER PPP FEU-MCNC: 0.38 UG/ML(FEU)
DIAGNOSIS, 93000: NORMAL
DIFFERENTIAL METHOD BLD: ABNORMAL
EOSINOPHIL # BLD: 0.2 K/UL (ref 0–0.8)
EOSINOPHIL NFR BLD: 3 % (ref 0.5–7.8)
ERYTHROCYTE [DISTWIDTH] IN BLOOD BY AUTOMATED COUNT: 15.6 % (ref 11.9–14.6)
GLOBULIN SER CALC-MCNC: 4.4 G/DL (ref 2.3–3.5)
GLUCOSE SERPL-MCNC: 85 MG/DL (ref 65–100)
HCT VFR BLD AUTO: 36.1 % (ref 35.8–46.3)
HGB BLD-MCNC: 10.9 G/DL (ref 11.7–15.4)
IMM GRANULOCYTES # BLD AUTO: 0.1 K/UL (ref 0–0.5)
IMM GRANULOCYTES NFR BLD AUTO: 1 % (ref 0–5)
LYMPHOCYTES # BLD: 3.2 K/UL (ref 0.5–4.6)
LYMPHOCYTES NFR BLD: 34 % (ref 13–44)
MCH RBC QN AUTO: 24.4 PG (ref 26.1–32.9)
MCHC RBC AUTO-ENTMCNC: 30.2 G/DL (ref 31.4–35)
MCV RBC AUTO: 80.9 FL (ref 79.6–97.8)
MONOCYTES # BLD: 1.1 K/UL (ref 0.1–1.3)
MONOCYTES NFR BLD: 12 % (ref 4–12)
NEUTS SEG # BLD: 4.8 K/UL (ref 1.7–8.2)
NEUTS SEG NFR BLD: 51 % (ref 43–78)
NRBC # BLD: 0 K/UL (ref 0–0.2)
P-R INTERVAL, ECG05: 148 MS
PLATELET # BLD AUTO: 457 K/UL (ref 150–450)
PMV BLD AUTO: 8.7 FL (ref 9.4–12.3)
POTASSIUM SERPL-SCNC: 3.9 MMOL/L (ref 3.5–5.1)
PROT SERPL-MCNC: 7.8 G/DL (ref 6.3–8.2)
Q-T INTERVAL, ECG07: 336 MS
QRS DURATION, ECG06: 72 MS
QTC CALCULATION (BEZET), ECG08: 424 MS
RBC # BLD AUTO: 4.46 M/UL (ref 4.05–5.2)
SODIUM SERPL-SCNC: 133 MMOL/L (ref 136–145)
TROPONIN I SERPL-MCNC: <0.02 NG/ML (ref 0.02–0.05)
TROPONIN I SERPL-MCNC: <0.02 NG/ML (ref 0.02–0.05)
VENTRICULAR RATE, ECG03: 96 BPM
WBC # BLD AUTO: 9.4 K/UL (ref 4.3–11.1)

## 2019-09-30 PROCEDURE — 96361 HYDRATE IV INFUSION ADD-ON: CPT | Performed by: EMERGENCY MEDICINE

## 2019-09-30 PROCEDURE — 80053 COMPREHEN METABOLIC PANEL: CPT

## 2019-09-30 PROCEDURE — 96374 THER/PROPH/DIAG INJ IV PUSH: CPT | Performed by: EMERGENCY MEDICINE

## 2019-09-30 PROCEDURE — 74011250636 HC RX REV CODE- 250/636: Performed by: EMERGENCY MEDICINE

## 2019-09-30 PROCEDURE — 99285 EMERGENCY DEPT VISIT HI MDM: CPT | Performed by: EMERGENCY MEDICINE

## 2019-09-30 PROCEDURE — 85025 COMPLETE CBC W/AUTO DIFF WBC: CPT

## 2019-09-30 PROCEDURE — 71046 X-RAY EXAM CHEST 2 VIEWS: CPT

## 2019-09-30 PROCEDURE — 84484 ASSAY OF TROPONIN QUANT: CPT

## 2019-09-30 PROCEDURE — 85379 FIBRIN DEGRADATION QUANT: CPT

## 2019-09-30 PROCEDURE — 93005 ELECTROCARDIOGRAM TRACING: CPT | Performed by: EMERGENCY MEDICINE

## 2019-09-30 RX ORDER — MORPHINE SULFATE 4 MG/ML
4 INJECTION INTRAVENOUS
Status: COMPLETED | OUTPATIENT
Start: 2019-09-30 | End: 2019-09-30

## 2019-09-30 RX ADMIN — SODIUM CHLORIDE 1000 ML: 900 INJECTION, SOLUTION INTRAVENOUS at 06:47

## 2019-09-30 RX ADMIN — MORPHINE SULFATE 4 MG: 4 INJECTION INTRAVENOUS at 06:45

## 2019-09-30 NOTE — ED PROVIDER NOTES
Patient is a 64 yo female who presents with cough and chest pain. States cough for the past 2 weeks and diagnosed with bronchitis however states today with pain in her chest and \"pulling and tightness\". States no fevers or chills, no nausea or vomiting, no abdominal pain, no further complaints. Overall patient is very well appearing and in NAD. Past Medical History:   Diagnosis Date    Arthritis     Gastrointestinal disorder     diverticulitis-gerd    Other ill-defined conditions(599.89)     Chronic back,neck,hip pain.     Other unknown and unspecified cause of morbidity or mortality     bells palsy    Psychiatric disorder     anxiety       Past Surgical History:   Procedure Laterality Date    HX CHOLECYSTECTOMY      HX HYSTERECTOMY      HX ORTHOPAEDIC      neck x2    NEUROLOGICAL PROCEDURE UNLISTED      cervical fusion         Family History:   Problem Relation Age of Onset    Breast Cancer Paternal Aunt 80       Social History     Socioeconomic History    Marital status:      Spouse name: Not on file    Number of children: Not on file    Years of education: Not on file    Highest education level: Not on file   Occupational History    Not on file   Social Needs    Financial resource strain: Not on file    Food insecurity:     Worry: Not on file     Inability: Not on file    Transportation needs:     Medical: Not on file     Non-medical: Not on file   Tobacco Use    Smoking status: Never Smoker    Smokeless tobacco: Former User   Substance and Sexual Activity    Alcohol use: No    Drug use: No    Sexual activity: Not Currently   Lifestyle    Physical activity:     Days per week: Not on file     Minutes per session: Not on file    Stress: Not on file   Relationships    Social connections:     Talks on phone: Not on file     Gets together: Not on file     Attends Taoism service: Not on file     Active member of club or organization: Not on file     Attends meetings of clubs or organizations: Not on file     Relationship status: Not on file    Intimate partner violence:     Fear of current or ex partner: Not on file     Emotionally abused: Not on file     Physically abused: Not on file     Forced sexual activity: Not on file   Other Topics Concern    Not on file   Social History Narrative    Not on file         ALLERGIES: Pcn [penicillins] and Aspirin    Review of Systems   Constitutional: Negative for chills and fever. HENT: Negative for rhinorrhea and sore throat. Eyes: Negative for visual disturbance. Respiratory: Positive for shortness of breath. Negative for cough. Cardiovascular: Positive for chest pain. Negative for leg swelling. Gastrointestinal: Negative for abdominal pain, diarrhea, nausea and vomiting. Genitourinary: Negative for dysuria. Musculoskeletal: Negative for back pain and neck pain. Skin: Negative for rash. Neurological: Negative for weakness and headaches. Psychiatric/Behavioral: The patient is not nervous/anxious. Vitals:    09/30/19 0434   BP: 129/76   Pulse: 98   Resp: 20   Temp: 98.5 °F (36.9 °C)   SpO2: 97%   Weight: 81.6 kg (180 lb)   Height: 5' 6\" (1.676 m)            Physical Exam   Constitutional: She is oriented to person, place, and time. She appears well-developed and well-nourished. HENT:   Head: Normocephalic. Right Ear: External ear normal.   Left Ear: External ear normal.   Eyes: Pupils are equal, round, and reactive to light. Conjunctivae and EOM are normal.   Neck: Normal range of motion. Neck supple. No tracheal deviation present. Cardiovascular: Normal rate, regular rhythm, normal heart sounds and intact distal pulses. No murmur heard. Pulmonary/Chest: Effort normal and breath sounds normal. No respiratory distress. Abdominal: Soft. There is no tenderness. Musculoskeletal: Normal range of motion. Neurological: She is alert and oriented to person, place, and time. No cranial nerve deficit. Skin: No rash noted. Nursing note and vitals reviewed. MDM  Number of Diagnoses or Management Options     Amount and/or Complexity of Data Reviewed  Clinical lab tests: ordered and reviewed  Tests in the radiology section of CPT®: ordered and reviewed  Tests in the medicine section of CPT®: ordered and reviewed  Review and summarize past medical records: yes    Risk of Complications, Morbidity, and/or Mortality  Presenting problems: high  Diagnostic procedures: high  Management options: high    Patient Progress  Patient progress: stable         Procedures  Recent Results (from the past 12 hour(s))   EKG, 12 LEAD, INITIAL    Collection Time: 09/30/19  4:41 AM   Result Value Ref Range    Ventricular Rate 96 BPM    Atrial Rate 96 BPM    P-R Interval 148 ms    QRS Duration 72 ms    Q-T Interval 336 ms    QTC Calculation (Bezet) 424 ms    Calculated P Axis 41 degrees    Calculated R Axis -7 degrees    Calculated T Axis -9 degrees    Diagnosis       Normal sinus rhythm  Minimal voltage criteria for LVH, may be normal variant  Borderline ECG  When compared with ECG of 15-DEC-2018 20:14,  No significant change was found  Confirmed by Rose Muir MD (), COLEEN YANCEY ([id number]) on 9/30/2019 6:56:13 AM     CBC WITH AUTOMATED DIFF    Collection Time: 09/30/19  4:47 AM   Result Value Ref Range    WBC 9.4 4.3 - 11.1 K/uL    RBC 4.46 4.05 - 5.2 M/uL    HGB 10.9 (L) 11.7 - 15.4 g/dL    HCT 36.1 35.8 - 46.3 %    MCV 80.9 79.6 - 97.8 FL    MCH 24.4 (L) 26.1 - 32.9 PG    MCHC 30.2 (L) 31.4 - 35.0 g/dL    RDW 15.6 (H) 11.9 - 14.6 %    PLATELET 120 (H) 068 - 450 K/uL    MPV 8.7 (L) 9.4 - 12.3 FL    ABSOLUTE NRBC 0.00 0.0 - 0.2 K/uL    DF AUTOMATED      NEUTROPHILS 51 43 - 78 %    LYMPHOCYTES 34 13 - 44 %    MONOCYTES 12 4.0 - 12.0 %    EOSINOPHILS 3 0.5 - 7.8 %    BASOPHILS 1 0.0 - 2.0 %    IMMATURE GRANULOCYTES 1 0.0 - 5.0 %    ABS. NEUTROPHILS 4.8 1.7 - 8.2 K/UL    ABS. LYMPHOCYTES 3.2 0.5 - 4.6 K/UL    ABS.  MONOCYTES 1.1 0.1 - 1.3 K/UL    ABS. EOSINOPHILS 0.2 0.0 - 0.8 K/UL    ABS. BASOPHILS 0.1 0.0 - 0.2 K/UL    ABS. IMM. GRANS. 0.1 0.0 - 0.5 K/UL   METABOLIC PANEL, COMPREHENSIVE    Collection Time: 09/30/19  4:47 AM   Result Value Ref Range    Sodium 133 (L) 136 - 145 mmol/L    Potassium 3.9 3.5 - 5.1 mmol/L    Chloride 110 (H) 98 - 107 mmol/L    CO2 27 21 - 32 mmol/L    Anion gap Cannot be calculated 7 - 16 mmol/L    Glucose 85 65 - 100 mg/dL    BUN 22 6 - 23 MG/DL    Creatinine 1.09 (H) 0.6 - 1.0 MG/DL    GFR est AA >60 >60 ml/min/1.73m2    GFR est non-AA 55 (L) >60 ml/min/1.73m2    Calcium 8.6 8.3 - 10.4 MG/DL    Bilirubin, total 0.3 0.2 - 1.1 MG/DL    ALT (SGPT) 27 12 - 65 U/L    AST (SGOT) 16 15 - 37 U/L    Alk. phosphatase 121 50 - 136 U/L    Protein, total 7.8 6.3 - 8.2 g/dL    Albumin 3.4 (L) 3.5 - 5.0 g/dL    Globulin 4.4 (H) 2.3 - 3.5 g/dL    A-G Ratio 0.8 (L) 1.2 - 3.5     TROPONIN I    Collection Time: 09/30/19  4:47 AM   Result Value Ref Range    Troponin-I, Qt. <0.02 (L) 0.02 - 0.05 NG/ML   D DIMER    Collection Time: 09/30/19  4:47 AM   Result Value Ref Range    D DIMER 0.38 <0.56 ug/ml(FEU)     Xr Chest Pa Lat    Result Date: 9/30/2019  Frontal and lateral views of the chest CLINICAL HISTORY: Cough. FINDINGS: There is no focal pulmonary consolidation, pleural effusion or pneumothorax. No pulmonary edema. Heart appears mildly enlarged. Mediastinal contour is within normal limits. Cervical spinal fusion hardware is visualized. IMPRESSION: 1. No radiographic evidence of pneumonia. 64 yo female with chest pain:      Patient is VERY well appearing, no pain at this time and pain seems somewhat musculoskeletal in nature and her HEART score is 2 for age and risk factors with EKG NSR without acute ischemic changes and delta troponin WNL so low suspicion ACS .   D-dimer WNL and EKG is NSR without evidence of right hear strain or any further concerns to suspect PE on history or physical including no swelling of legs so very low suspicion of PE or further acute intrathoracic process. CXR without pneumonia or further acute process. Patient to follow up with cardiology or return with any return or worsening symptoms or any further concerns.

## 2019-09-30 NOTE — ED TRIAGE NOTES
Pt states she has been having chest pain for the last 2 days and that she has been recently dx with bronchitis last week and she is having a cough

## 2019-09-30 NOTE — ED NOTES
I have reviewed discharge instructions with the patient. The patient verbalized understanding. Patient left ED via Discharge Method: wheelchair. Opportunity for questions and clarification provided. Patient given 0 scripts. To continue your aftercare when you leave the hospital, you may receive an automated call from our care team to check in on how you are doing. This is a free service and part of our promise to provide the best care and service to meet your aftercare needs.  If you have questions, or wish to unsubscribe from this service please call [phone]. Thank you for Choosing our [facility] Emergency Department.

## 2020-12-02 ENCOUNTER — TRANSCRIBE ORDER (OUTPATIENT)
Dept: SCHEDULING | Age: 60
End: 2020-12-02

## 2020-12-02 DIAGNOSIS — N95.9 UNSPECIFIED MENOPAUSAL AND PERIMENOPAUSAL DISORDER: ICD-10-CM

## 2020-12-02 DIAGNOSIS — Z12.31 VISIT FOR SCREENING MAMMOGRAM: Primary | ICD-10-CM

## 2020-12-30 ENCOUNTER — HOSPITAL ENCOUNTER (OUTPATIENT)
Dept: MAMMOGRAPHY | Age: 60
Discharge: HOME | End: 2020-12-30
Payer: COMMERCIAL

## 2020-12-30 ENCOUNTER — APPOINTMENT (OUTPATIENT)
Dept: MAMMOGRAPHY | Age: 60
End: 2020-12-30
Payer: COMMERCIAL

## 2020-12-30 DIAGNOSIS — Z12.31 VISIT FOR SCREENING MAMMOGRAM: ICD-10-CM

## 2020-12-30 PROCEDURE — 77067 SCR MAMMO BI INCL CAD: CPT

## 2021-03-23 PROBLEM — G95.20 CERVICAL CORD COMPRESSION WITH MYELOPATHY (HCC): Status: ACTIVE | Noted: 2021-03-23

## 2021-03-23 PROBLEM — M43.16 SPONDYLOLISTHESIS AT L4-L5 LEVEL: Status: ACTIVE | Noted: 2021-03-23

## 2021-03-23 PROBLEM — D17.1 LIPOMA OF TORSO: Status: ACTIVE | Noted: 2021-03-23

## 2021-12-13 ENCOUNTER — HOSPITAL ENCOUNTER (EMERGENCY)
Age: 61
Discharge: HOME | End: 2021-12-13
Attending: EMERGENCY MEDICINE

## 2021-12-13 ENCOUNTER — APPOINTMENT (OUTPATIENT)
Dept: GENERAL RADIOLOGY | Age: 61
End: 2021-12-13
Attending: EMERGENCY MEDICINE

## 2021-12-13 VITALS
TEMPERATURE: 97.8 F | DIASTOLIC BLOOD PRESSURE: 81 MMHG | SYSTOLIC BLOOD PRESSURE: 144 MMHG | HEART RATE: 91 BPM | OXYGEN SATURATION: 98 % | RESPIRATION RATE: 18 BRPM

## 2021-12-13 DIAGNOSIS — G89.4 CHRONIC PAIN SYNDROME: ICD-10-CM

## 2021-12-13 DIAGNOSIS — R07.89 ATYPICAL CHEST PAIN: Primary | ICD-10-CM

## 2021-12-13 LAB
ALBUMIN SERPL-MCNC: 3.7 G/DL (ref 3.2–4.6)
ALBUMIN/GLOB SERPL: 0.9 {RATIO} (ref 1.2–3.5)
ALP SERPL-CCNC: 91 U/L (ref 50–136)
ALT SERPL-CCNC: 31 U/L (ref 12–65)
ANION GAP SERPL CALC-SCNC: 4 MMOL/L (ref 7–16)
AST SERPL-CCNC: 16 U/L (ref 15–37)
BASOPHILS # BLD: 0 K/UL (ref 0–0.2)
BASOPHILS NFR BLD: 0 % (ref 0–2)
BILIRUB SERPL-MCNC: 0.5 MG/DL (ref 0.2–1.1)
BUN SERPL-MCNC: 14 MG/DL (ref 8–23)
CALCIUM SERPL-MCNC: 9.8 MG/DL (ref 8.3–10.4)
CHLORIDE SERPL-SCNC: 103 MMOL/L (ref 98–107)
CO2 SERPL-SCNC: 32 MMOL/L (ref 21–32)
CREAT SERPL-MCNC: 0.71 MG/DL (ref 0.6–1)
DIFFERENTIAL METHOD BLD: ABNORMAL
EOSINOPHIL # BLD: 0 K/UL (ref 0–0.8)
EOSINOPHIL NFR BLD: 0 % (ref 0.5–7.8)
ERYTHROCYTE [DISTWIDTH] IN BLOOD BY AUTOMATED COUNT: 15.5 % (ref 11.9–14.6)
GLOBULIN SER CALC-MCNC: 4.2 G/DL (ref 2.3–3.5)
GLUCOSE SERPL-MCNC: 128 MG/DL (ref 65–100)
HCT VFR BLD AUTO: 37.8 % (ref 35.8–46.3)
HGB BLD-MCNC: 12.1 G/DL (ref 11.7–15.4)
IMM GRANULOCYTES # BLD AUTO: 0.1 K/UL (ref 0–0.5)
IMM GRANULOCYTES NFR BLD AUTO: 1 % (ref 0–5)
LYMPHOCYTES # BLD: 1.7 K/UL (ref 0.5–4.6)
LYMPHOCYTES NFR BLD: 13 % (ref 13–44)
MAGNESIUM SERPL-MCNC: 2.5 MG/DL (ref 1.8–2.4)
MCH RBC QN AUTO: 24.6 PG (ref 26.1–32.9)
MCHC RBC AUTO-ENTMCNC: 32 G/DL (ref 31.4–35)
MCV RBC AUTO: 76.8 FL (ref 79.6–97.8)
MONOCYTES # BLD: 0.4 K/UL (ref 0.1–1.3)
MONOCYTES NFR BLD: 3 % (ref 4–12)
NEUTS SEG # BLD: 10.5 K/UL (ref 1.7–8.2)
NEUTS SEG NFR BLD: 83 % (ref 43–78)
NRBC # BLD: 0 K/UL (ref 0–0.2)
PLATELET # BLD AUTO: 515 K/UL (ref 150–450)
PMV BLD AUTO: 8.2 FL (ref 9.4–12.3)
POTASSIUM SERPL-SCNC: 3.7 MMOL/L (ref 3.5–5.1)
PROT SERPL-MCNC: 7.9 G/DL (ref 6.3–8.2)
RBC # BLD AUTO: 4.92 M/UL (ref 4.05–5.2)
SODIUM SERPL-SCNC: 139 MMOL/L (ref 136–145)
TROPONIN-HIGH SENSITIVITY: 8.6 PG/ML (ref 0–14)
TROPONIN-HIGH SENSITIVITY: 8.6 PG/ML (ref 0–14)
WBC # BLD AUTO: 12.8 K/UL (ref 4.3–11.1)

## 2021-12-13 PROCEDURE — 83735 ASSAY OF MAGNESIUM: CPT

## 2021-12-13 PROCEDURE — 93005 ELECTROCARDIOGRAM TRACING: CPT | Performed by: EMERGENCY MEDICINE

## 2021-12-13 PROCEDURE — 84484 ASSAY OF TROPONIN QUANT: CPT

## 2021-12-13 PROCEDURE — 74011250637 HC RX REV CODE- 250/637: Performed by: EMERGENCY MEDICINE

## 2021-12-13 PROCEDURE — 96374 THER/PROPH/DIAG INJ IV PUSH: CPT

## 2021-12-13 PROCEDURE — 85025 COMPLETE CBC W/AUTO DIFF WBC: CPT

## 2021-12-13 PROCEDURE — 74011250636 HC RX REV CODE- 250/636: Performed by: EMERGENCY MEDICINE

## 2021-12-13 PROCEDURE — 80053 COMPREHEN METABOLIC PANEL: CPT

## 2021-12-13 PROCEDURE — 81003 URINALYSIS AUTO W/O SCOPE: CPT

## 2021-12-13 PROCEDURE — 71046 X-RAY EXAM CHEST 2 VIEWS: CPT

## 2021-12-13 PROCEDURE — 99285 EMERGENCY DEPT VISIT HI MDM: CPT

## 2021-12-13 RX ORDER — SODIUM CHLORIDE 0.9 % (FLUSH) 0.9 %
5-10 SYRINGE (ML) INJECTION EVERY 8 HOURS
Status: DISCONTINUED | OUTPATIENT
Start: 2021-12-13 | End: 2021-12-14 | Stop reason: HOSPADM

## 2021-12-13 RX ORDER — OXYCODONE HYDROCHLORIDE 5 MG/1
10 TABLET ORAL
Status: COMPLETED | OUTPATIENT
Start: 2021-12-13 | End: 2021-12-13

## 2021-12-13 RX ORDER — ONDANSETRON 8 MG/1
8 TABLET, ORALLY DISINTEGRATING ORAL
Status: COMPLETED | OUTPATIENT
Start: 2021-12-13 | End: 2021-12-13

## 2021-12-13 RX ORDER — METAXALONE 800 MG/1
800 TABLET ORAL ONCE
Status: COMPLETED | OUTPATIENT
Start: 2021-12-13 | End: 2021-12-13

## 2021-12-13 RX ORDER — KETOROLAC TROMETHAMINE 15 MG/ML
15 INJECTION, SOLUTION INTRAMUSCULAR; INTRAVENOUS
Status: COMPLETED | OUTPATIENT
Start: 2021-12-13 | End: 2021-12-13

## 2021-12-13 RX ORDER — SODIUM CHLORIDE 0.9 % (FLUSH) 0.9 %
5-10 SYRINGE (ML) INJECTION AS NEEDED
Status: DISCONTINUED | OUTPATIENT
Start: 2021-12-13 | End: 2021-12-14 | Stop reason: HOSPADM

## 2021-12-13 RX ADMIN — KETOROLAC TROMETHAMINE 15 MG: 15 INJECTION, SOLUTION INTRAMUSCULAR; INTRAVENOUS at 20:47

## 2021-12-13 RX ADMIN — OXYCODONE 10 MG: 5 TABLET ORAL at 20:47

## 2021-12-13 RX ADMIN — METAXALONE 800 MG: 800 TABLET ORAL at 20:47

## 2021-12-13 RX ADMIN — ONDANSETRON 8 MG: 8 TABLET, ORALLY DISINTEGRATING ORAL at 22:00

## 2021-12-13 RX ADMIN — OXYCODONE 10 MG: 5 TABLET ORAL at 22:00

## 2021-12-13 NOTE — Clinical Note
[id number] [address] EMERGENCY DEPT  [address] [facility] [address] [phone]    Work/School Note    Date: 12/13/2021    To Whom It May concern:    Cristin Giraldo was seen and treated today in the emergency room by the following provider(s):  Attending Provider: Greg Granados MD.      Cristin Giraldo is excused from work/school on 12/13/21 and 12/14/21. She is medically clear to return to work/school on 12/15/2021.        Sincerely,          Pedrito Cheung MD

## 2021-12-14 LAB
ATRIAL RATE: 76 BPM
CALCULATED P AXIS, ECG09: 43 DEGREES
CALCULATED R AXIS, ECG10: 11 DEGREES
CALCULATED T AXIS, ECG11: 18 DEGREES
DIAGNOSIS, 93000: NORMAL
P-R INTERVAL, ECG05: 144 MS
Q-T INTERVAL, ECG07: 368 MS
QRS DURATION, ECG06: 76 MS
QTC CALCULATION (BEZET), ECG08: 414 MS
VENTRICULAR RATE, ECG03: 76 BPM

## 2021-12-14 NOTE — ED TRIAGE NOTES
Pt presents by EMS from work c/o CP, dizziness, nausea, SOB. Pt is on abx for sinusitis and has had all of these symptoms for the past few days.

## 2021-12-14 NOTE — DISCHARGE INSTRUCTIONS
Continue all your current medications  Drink plenty of fluids  No heavy lifting  Call your doctor in the morning  Call cardiology for follow-up visit    Return to ER for any worsening symptoms or new problems which may arise

## 2021-12-14 NOTE — ED PROVIDER NOTES
81-year-old female presents with complaints of chest pain, body pain, neck pain and low back pain    Patient reports she has had intermittent episodes of chest pain over the last several weeks  She has not been short of breath or had any nausea or vomiting. Patient is actually more focused on her neck pain and back pain. States that she has had prior surgeries and that her neurosurgeon, Dr. Jorge Mclaughlin, has told her she will need more surgery. She denies any focal neurologic findings    The history is provided by the patient. Chest Pain   This is a recurrent problem. The current episode started more than 1 week ago. The problem has been gradually worsening. The problem occurs constantly. The pain is associated with normal activity. The pain is present in the left side. The pain is moderate. The quality of the pain is described as stabbing and sharp. The pain does not radiate. The symptoms are aggravated by movement. Associated symptoms include back pain, headaches and malaise/fatigue. Pertinent negatives include no abdominal pain, no cough, no diaphoresis, no dizziness, no exertional chest pressure, no fever, no palpitations, no shortness of breath, no sputum production and no vomiting. Treatments tried: Patient under pain management. Takes both long-acting and short acting oxycodone. The treatment provided mild relief. Risk factors include obesity. Her past medical history does not include DM or HTN. Pertinent negatives include no cardiac catheterization. Past Medical History:   Diagnosis Date    Anxiety     anxiety    Arthritis     Bell's palsy     Chronic low back pain     ,neck,hip pain.     CTS (carpal tunnel syndrome)     Depression     Diverticulitis     GERD (gastroesophageal reflux disease)     Primary insomnia     Vitamin D deficiency        Past Surgical History:   Procedure Laterality Date    HX CHOLECYSTECTOMY      HX HYSTERECTOMY      HX ORTHOPAEDIC      neck x2    NEUROLOGICAL PROCEDURE UNLISTED      cervical fusion         Family History:   Problem Relation Age of Onset    Breast Cancer Paternal Aunt 80       Social History     Socioeconomic History    Marital status:      Spouse name: Not on file    Number of children: Not on file    Years of education: Not on file    Highest education level: Not on file   Occupational History    Not on file   Tobacco Use    Smoking status: Never Smoker    Smokeless tobacco: Former User   Vaping Use    Vaping Use: Never used   Substance and Sexual Activity    Alcohol use: No    Drug use: No    Sexual activity: Not Currently   Other Topics Concern    Not on file   Social History Narrative    Not on file     Social Determinants of Health     Financial Resource Strain:     Difficulty of Paying Living Expenses: Not on file   Food Insecurity:     Worried About [address] in the Last Year: Not on file    [address] [employer] in the Last Year: Not on file   Transportation Needs:     Lack of Transportation (Medical): Not on file    Lack of Transportation (Non-Medical):  Not on file   Physical Activity:     Days of Exercise per Week: Not on file    Minutes of Exercise per Session: Not on file   Stress:     Feeling of Stress : Not on file   Social Connections:     Frequency of Communication with Friends and Family: Not on file    Frequency of Social Gatherings with Friends and Family: Not on file    Attends [employer] Services: Not on file    Active Member of Clubs or Organizations: Not on file    Attends Club or Organization Meetings: Not on file    Marital Status: Not on file   Intimate Partner Violence:     Fear of Current or Ex-Partner: Not on file    Emotionally Abused: Not on file    Physically Abused: Not on file    Sexually Abused: Not on file   Housing Stability:     Unable to Pay for Housing in the Last Year: Not on file    Number of [place] in the Last Year: Not on file    Unstable Housing in the Last Year: Not on file         ALLERGIES: Pcn [penicillins] and Aspirin    Review of Systems   Constitutional: Positive for malaise/fatigue. Negative for chills, diaphoresis and fever. HENT: Negative for congestion, ear pain and rhinorrhea. Eyes: Negative for photophobia and discharge. Respiratory: Negative for cough, sputum production and shortness of breath. Cardiovascular: Positive for chest pain. Negative for palpitations. Gastrointestinal: Negative for abdominal pain, constipation, diarrhea and vomiting. Endocrine: Negative for cold intolerance and heat intolerance. Genitourinary: Negative for dysuria and flank pain. Musculoskeletal: Positive for arthralgias, back pain, myalgias and neck pain. Skin: Negative for rash and wound. Allergic/Immunologic: Negative for environmental allergies and food allergies. Neurological: Positive for headaches. Negative for dizziness and syncope. Hematological: Negative for adenopathy. Does not bruise/bleed easily. Psychiatric/Behavioral: Negative for dysphoric mood. The patient is nervous/anxious and is hyperactive. All other systems reviewed and are negative. Vitals:    12/13/21 1953   BP: (!) 148/85   Pulse: 78   Resp: 18   Temp: 97.3 °F (36.3 °C)            Physical Exam  Vitals and nursing note reviewed. Constitutional:       General: She is in acute distress. Appearance: Normal appearance. She is well-developed. She is obese. HENT:      Head: Normocephalic and atraumatic. Right Ear: External ear normal.      Left Ear: External ear normal.      Mouth/Throat:      Pharynx: No oropharyngeal exudate. Eyes:      Conjunctiva/sclera: Conjunctivae normal.      Pupils: Pupils are equal, round, and reactive to light. Neck:      Vascular: No JVD. Cardiovascular:      Rate and Rhythm: Normal rate and regular rhythm. Heart sounds: Normal heart sounds. No murmur heard. No friction rub. No gallop.     Pulmonary:      Effort: Pulmonary effort is normal.      Breath sounds: Normal breath sounds. Chest:      Chest wall: Tenderness present. Comments: Diffusely tender to palpation  Patient winced when I placed my stethoscope on her chest  Abdominal:      General: Bowel sounds are normal. There is no distension. Palpations: Abdomen is soft. There is no mass. Tenderness: There is no abdominal tenderness. Musculoskeletal:         General: No deformity. Normal range of motion. Cervical back: Normal range of motion and neck supple. Skin:     General: Skin is warm and dry. Capillary Refill: Capillary refill takes less than 2 seconds. Findings: No rash. Neurological:      General: No focal deficit present. Mental Status: She is alert and oriented to person, place, and time. Cranial Nerves: No cranial nerve deficit. Sensory: No sensory deficit. Gait: Gait normal.   Psychiatric:         Mood and Affect: Mood is anxious. Speech: Speech is rapid and pressured. Behavior: Behavior is hyperactive. Behavior is cooperative. Thought Content: Thought content normal.         Cognition and Memory: Cognition and memory normal.         Judgment: Judgment normal.          MDM  Number of Diagnoses or Management Options  Atypical chest pain: new and requires workup  Chronic pain syndrome: established and worsening  Diagnosis management comments: EKG reviewed  No acute ischemic changes    Urine dip negative    Patient's controlled substance prescription records reviewed @ the [facility]  Aware website. Information will be utilized for accurate and appropriate patient care.   12/05/2021 11/30/2021   1  Zolpidem Tart Er 12.5 Mg Tab   30.00  30  Ca Christina  [id number]  Shell (6841)  0/0  0.63 LME  [facility]'s [facility]  [facility]    12/05/2021 11/30/2021   1  Xtampza Er 9 Mg Capsule   60.00  30  Ca Christina  [id number]  [facility] (4237)  0/0  27.00 MME  Worker's Comp  [facility]    11/30/2021 11/30/2021   1 Oxycodone-Acetaminophen    120.00  30  Ca Christina  [id number]  Shell (6841)  0/0  60.00 MME  Worker's Comp  [facility]    11/04/2021  10/29/2021   1  Zolpidem Tart Er 12.5 Mg Tab   30.00  30  Ca Christina  [id number]  Shell (6841)  0/0  0.63 LME  [facility]    11/04/2021  10/29/2021   1  Xtampza Er 9 Mg Capsule   60.00  30  Ca Christina  [id number]  Shell (6841)  0/0  27.00 MME  Worker's Comp  [facility]    10/30/2021  10/29/2021   1  Oxycodone-Acetaminophen    120.00  30  Ca Christina  [id number]  Shell (1241)  0/0  60.00 MME  [facility]'s Comp  [facility]    10/05/2021  09/29/2021   1  Xtampza Er 9 Mg Capsule   60.00  30  Ca Christina  [id number]  [facility] (3541)  0/0  27.00 MME  [facility]    10/05/2021  09/29/2021   1  Zolpidem Tart Er 12.5 Mg Tab   30.00  30  Ca Christina  [id number]  Shell (6841)  0/0  0.63 LME  [facility]    10/01/2021  09/29/2021   1  Oxycodone-Acetaminophen    120.00  30  Ca Christina  [id number]  [facility] (2741)  0/0  60.00 MME  [facility]    09/05/2021 08/23/2021   1  Xtampza Er 9 Mg Capsule   60.00  30  Ca Christina  [id number]  Shell (6841)  0/0  27.00 MME  [facility]    09/05/2021 08/23/2021   1  Zolpidem Tart Er 12.5 Mg Tab   30.00  30  Ca Christina  [id number]  Shell (6841)  0/0  0.63 LME  [facility]    09/01/2021 08/23/2021   1  Oxycodone-Acetaminophen    120.00  30  Ca Christina  [id number]  Shell (3141)  0/0  60.00 MME  Worker's Comp  [facility]    08/05/2021 07/26/2021   1  Xtampza Er 9 Mg Capsule   60.00  30  Ca Christina  [id number]  Shell (7191)  0/0  27.00 MME  Worker's Comp  [facility]    07/29/2021 07/26/2021   1  Oxycodone-Acetaminophen    120.00  30  Ca Christina  [id number]  Shell (6841)  0/0  60.00 MME  Worker's      635 PM  41-year-old female here with complaints of chest pain along with complaints of chronic pain in her neck back and head  Has been on multiple courses of antibiotics for sinusitis per her report as well. Apparently the chest pain is actually been intermittent for at least the last several weeks.     EKG is unremarkable    We will check labs and x-ray    Medicate the patient with Toradol oxycodone and a muscle relaxer    10:29 PM  Work-up today including 2 troponins normal    No other acute findings    Patient will be discharged to follow-up with her primary care doctor, pain management doctor, and neurosurgeon  Patient referred to cardiology for outpatient work-up       Amount and/or Complexity of Data Reviewed  Clinical lab tests: ordered and reviewed  Tests in the radiology section of CPT®: ordered and reviewed  Tests in the medicine section of CPT®: reviewed and ordered  Decide to obtain previous medical records or to obtain history from someone other than the patient: yes  Review and summarize past medical records: yes  Independent visualization of images, tracings, or specimens: yes    Risk of Complications, Morbidity, and/or Mortality  Presenting problems: moderate  Diagnostic procedures: moderate  Management options: moderate  General comments: Elements of this note have been dictated via voice recognition software. Text and phrases may be limited by the accuracy of the software. The chart has been reviewed, but errors may still be present.       Patient Progress  Patient progress: stable         EKG    Date/Time: 12/13/2021 8:09 PM  Performed by: Olivia Moore MD  Authorized by: Olivia Moore MD     ECG reviewed by ED Physician in the absence of a cardiologist: yes    Previous ECG:     Previous ECG:  Compared to current    Similarity:  No change  Interpretation:     Interpretation: normal    Rate:     ECG rate assessment: normal    Rhythm:     Rhythm: sinus rhythm    Ectopy:     Ectopy: none    QRS:     QRS axis:  Normal    QRS intervals:  Normal  Conduction:     Conduction: normal    ST segments:     ST segments:  Normal  T waves:     T waves: normal    Comments:      No acute ischemic changes  No interval change from prior tracing

## 2021-12-14 NOTE — ED NOTES
I have reviewed discharge instructions with the patient. The patient verbalized understanding. Patient left ED via Discharge Method: wheelchair to Home with daughter. Opportunity for questions and clarification provided. Patient given 0 scripts. To continue your aftercare when you leave the hospital, you may receive an automated call from our care team to check in on how you are doing. This is a free service and part of our promise to provide the best care and service to meet your aftercare needs.  If you have questions, or wish to unsubscribe from this service please call [phone]. Thank you for Choosing our [facility].

## 2021-12-15 ENCOUNTER — HOSPITAL ENCOUNTER (EMERGENCY)
Age: 61
Discharge: HOME | End: 2021-12-15
Attending: EMERGENCY MEDICINE

## 2021-12-15 ENCOUNTER — APPOINTMENT (OUTPATIENT)
Dept: CT IMAGING | Age: 61
End: 2021-12-15

## 2021-12-15 ENCOUNTER — APPOINTMENT (OUTPATIENT)
Dept: GENERAL RADIOLOGY | Age: 61
End: 2021-12-15
Attending: STUDENT IN AN ORGANIZED HEALTH CARE EDUCATION/TRAINING PROGRAM

## 2021-12-15 VITALS
OXYGEN SATURATION: 96 % | WEIGHT: 215 LBS | HEIGHT: 66 IN | RESPIRATION RATE: 11 BRPM | HEART RATE: 80 BPM | DIASTOLIC BLOOD PRESSURE: 96 MMHG | TEMPERATURE: 99 F | SYSTOLIC BLOOD PRESSURE: 133 MMHG | BODY MASS INDEX: 34.55 KG/M2

## 2021-12-15 DIAGNOSIS — Z76.5 MALINGERING: Primary | ICD-10-CM

## 2021-12-15 DIAGNOSIS — J01.00 ACUTE MAXILLARY SINUSITIS, RECURRENCE NOT SPECIFIED: ICD-10-CM

## 2021-12-15 DIAGNOSIS — R07.89 ATYPICAL CHEST PAIN: ICD-10-CM

## 2021-12-15 LAB
ALBUMIN SERPL-MCNC: 3.3 G/DL (ref 3.2–4.6)
ALBUMIN/GLOB SERPL: 0.8 {RATIO} (ref 1.2–3.5)
ALP SERPL-CCNC: 84 U/L (ref 50–136)
ALT SERPL-CCNC: 30 U/L (ref 12–65)
ANION GAP SERPL CALC-SCNC: 5 MMOL/L (ref 7–16)
AST SERPL-CCNC: 14 U/L (ref 15–37)
ATRIAL RATE: 82 BPM
BASOPHILS # BLD: 0 K/UL (ref 0–0.2)
BASOPHILS NFR BLD: 0 % (ref 0–2)
BILIRUB SERPL-MCNC: 0.3 MG/DL (ref 0.2–1.1)
BILIRUB UR QL: NEGATIVE
BUN SERPL-MCNC: 20 MG/DL (ref 8–23)
CALCIUM SERPL-MCNC: 9.3 MG/DL (ref 8.3–10.4)
CALCULATED P AXIS, ECG09: 47 DEGREES
CALCULATED R AXIS, ECG10: 19 DEGREES
CALCULATED T AXIS, ECG11: 9 DEGREES
CHLORIDE SERPL-SCNC: 106 MMOL/L (ref 98–107)
CO2 SERPL-SCNC: 31 MMOL/L (ref 21–32)
CREAT SERPL-MCNC: 0.67 MG/DL (ref 0.6–1)
DIAGNOSIS, 93000: NORMAL
DIFFERENTIAL METHOD BLD: ABNORMAL
EOSINOPHIL # BLD: 0 K/UL (ref 0–0.8)
EOSINOPHIL NFR BLD: 0 % (ref 0.5–7.8)
ERYTHROCYTE [DISTWIDTH] IN BLOOD BY AUTOMATED COUNT: 15.8 % (ref 11.9–14.6)
GLOBULIN SER CALC-MCNC: 3.9 G/DL (ref 2.3–3.5)
GLUCOSE SERPL-MCNC: 92 MG/DL (ref 65–100)
GLUCOSE UR QL STRIP.AUTO: NEGATIVE MG/DL
HCT VFR BLD AUTO: 35.5 % (ref 35.8–46.3)
HGB BLD-MCNC: 11.3 G/DL (ref 11.7–15.4)
IMM GRANULOCYTES # BLD AUTO: 0 K/UL (ref 0–0.5)
IMM GRANULOCYTES NFR BLD AUTO: 0 % (ref 0–5)
KETONES UR-MCNC: NEGATIVE MG/DL
LEUKOCYTE ESTERASE UR QL STRIP: ABNORMAL
LIPASE SERPL-CCNC: 92 U/L (ref 73–393)
LYMPHOCYTES # BLD: 5.5 K/UL (ref 0.5–4.6)
LYMPHOCYTES NFR BLD: 35 % (ref 13–44)
MAGNESIUM SERPL-MCNC: 2.3 MG/DL (ref 1.8–2.4)
MCH RBC QN AUTO: 25.1 PG (ref 26.1–32.9)
MCHC RBC AUTO-ENTMCNC: 31.8 G/DL (ref 31.4–35)
MCV RBC AUTO: 78.9 FL (ref 79.6–97.8)
MONOCYTES # BLD: 0.9 K/UL (ref 0.1–1.3)
MONOCYTES NFR BLD: 6 % (ref 4–12)
NEUTS SEG # BLD: 9.2 K/UL (ref 1.7–8.2)
NEUTS SEG NFR BLD: 59 % (ref 43–78)
NITRITE UR QL: NEGATIVE
NRBC # BLD: 0 K/UL (ref 0–0.2)
P-R INTERVAL, ECG05: 138 MS
PH UR: 8.5 [PH] (ref 5–9)
PLATELET # BLD AUTO: 505 K/UL (ref 150–450)
PLATELET COMMENTS,PCOM: SLIGHT
PMV BLD AUTO: 8.6 FL (ref 9.4–12.3)
POTASSIUM SERPL-SCNC: 3.5 MMOL/L (ref 3.5–5.1)
PROT SERPL-MCNC: 7.2 G/DL (ref 6.3–8.2)
PROT UR QL: NEGATIVE MG/DL
Q-T INTERVAL, ECG07: 364 MS
QRS DURATION, ECG06: 74 MS
QTC CALCULATION (BEZET), ECG08: 425 MS
RBC # BLD AUTO: 4.5 M/UL (ref 4.05–5.2)
RBC # UR STRIP: NEGATIVE /UL
RBC MORPH BLD: ABNORMAL
RBC MORPH BLD: ABNORMAL
SERVICE CMNT-IMP: ABNORMAL
SODIUM SERPL-SCNC: 142 MMOL/L (ref 136–145)
SP GR UR: 1.01 (ref 1–1.02)
TROPONIN-HIGH SENSITIVITY: 6.2 PG/ML (ref 0–14)
TROPONIN-HIGH SENSITIVITY: 6.3 PG/ML (ref 0–14)
UROBILINOGEN UR QL: 0.2 EU/DL (ref 0.2–1)
VENTRICULAR RATE, ECG03: 82 BPM
WBC # BLD AUTO: 15.6 K/UL (ref 4.3–11.1)
WBC MORPH BLD: ABNORMAL

## 2021-12-15 PROCEDURE — 99285 EMERGENCY DEPT VISIT HI MDM: CPT

## 2021-12-15 PROCEDURE — 84484 ASSAY OF TROPONIN QUANT: CPT

## 2021-12-15 PROCEDURE — 96374 THER/PROPH/DIAG INJ IV PUSH: CPT

## 2021-12-15 PROCEDURE — 85025 COMPLETE CBC W/AUTO DIFF WBC: CPT

## 2021-12-15 PROCEDURE — 74011250636 HC RX REV CODE- 250/636

## 2021-12-15 PROCEDURE — 83735 ASSAY OF MAGNESIUM: CPT

## 2021-12-15 PROCEDURE — 93005 ELECTROCARDIOGRAM TRACING: CPT | Performed by: EMERGENCY MEDICINE

## 2021-12-15 PROCEDURE — 70496 CT ANGIOGRAPHY HEAD: CPT

## 2021-12-15 PROCEDURE — 74011000258 HC RX REV CODE- 258: Performed by: EMERGENCY MEDICINE

## 2021-12-15 PROCEDURE — 96375 TX/PRO/DX INJ NEW DRUG ADDON: CPT

## 2021-12-15 PROCEDURE — 71046 X-RAY EXAM CHEST 2 VIEWS: CPT

## 2021-12-15 PROCEDURE — 83690 ASSAY OF LIPASE: CPT

## 2021-12-15 PROCEDURE — 93005 ELECTROCARDIOGRAM TRACING: CPT | Performed by: STUDENT IN AN ORGANIZED HEALTH CARE EDUCATION/TRAINING PROGRAM

## 2021-12-15 PROCEDURE — 81003 URINALYSIS AUTO W/O SCOPE: CPT

## 2021-12-15 PROCEDURE — 74011000636 HC RX REV CODE- 636: Performed by: EMERGENCY MEDICINE

## 2021-12-15 PROCEDURE — 70450 CT HEAD/BRAIN W/O DYE: CPT

## 2021-12-15 PROCEDURE — 80053 COMPREHEN METABOLIC PANEL: CPT

## 2021-12-15 PROCEDURE — 0042T CT PERF W CBF: CPT

## 2021-12-15 RX ORDER — ONDANSETRON 4 MG/1
4 TABLET, ORALLY DISINTEGRATING ORAL
Qty: 12 TABLET | Refills: 0 | Status: SHIPPED | OUTPATIENT
Start: 2021-12-15 | End: 2021-12-22

## 2021-12-15 RX ORDER — DOXYCYCLINE HYCLATE 100 MG
100 TABLET ORAL 2 TIMES DAILY
Qty: 20 TABLET | Refills: 0 | Status: SHIPPED | OUTPATIENT
Start: 2021-12-15 | End: 2021-12-25

## 2021-12-15 RX ORDER — ONDANSETRON 2 MG/ML
4 INJECTION INTRAMUSCULAR; INTRAVENOUS
Status: COMPLETED | OUTPATIENT
Start: 2021-12-15 | End: 2021-12-15

## 2021-12-15 RX ORDER — SODIUM CHLORIDE 0.9 % (FLUSH) 0.9 %
5-10 SYRINGE (ML) INJECTION AS NEEDED
Status: DISCONTINUED | OUTPATIENT
Start: 2021-12-15 | End: 2021-12-15 | Stop reason: HOSPADM

## 2021-12-15 RX ORDER — SODIUM CHLORIDE 0.9 % (FLUSH) 0.9 %
10 SYRINGE (ML) INJECTION
Status: COMPLETED | OUTPATIENT
Start: 2021-12-15 | End: 2021-12-15

## 2021-12-15 RX ORDER — SODIUM CHLORIDE 0.9 % (FLUSH) 0.9 %
5-10 SYRINGE (ML) INJECTION EVERY 8 HOURS
Status: DISCONTINUED | OUTPATIENT
Start: 2021-12-15 | End: 2021-12-15 | Stop reason: HOSPADM

## 2021-12-15 RX ORDER — MORPHINE SULFATE 2 MG/ML
4 INJECTION, SOLUTION INTRAMUSCULAR; INTRAVENOUS
Status: COMPLETED | OUTPATIENT
Start: 2021-12-15 | End: 2021-12-15

## 2021-12-15 RX ORDER — MECLIZINE HYDROCHLORIDE 25 MG/1
12.5 TABLET ORAL
Qty: 15 TABLET | Refills: 0 | Status: SHIPPED | OUTPATIENT
Start: 2021-12-15 | End: 2021-12-25

## 2021-12-15 RX ADMIN — ONDANSETRON 4 MG: 2 INJECTION INTRAMUSCULAR; INTRAVENOUS at 18:46

## 2021-12-15 RX ADMIN — SODIUM CHLORIDE 100 ML: 900 INJECTION, SOLUTION INTRAVENOUS at 18:08

## 2021-12-15 RX ADMIN — MORPHINE SULFATE 4 MG: 2 INJECTION, SOLUTION INTRAMUSCULAR; INTRAVENOUS at 18:46

## 2021-12-15 RX ADMIN — Medication 10 ML: at 18:08

## 2021-12-15 RX ADMIN — IOPAMIDOL 100 ML: 755 INJECTION, SOLUTION INTRAVENOUS at 18:08

## 2021-12-15 NOTE — ED NOTES
Patient arrives [facility] from [facility] with complaint of chest pressure, neck pain, right arm vanessa, back, is nausea, headache, and vertigo. Patient have 18g right forearm.     324 ASA  1 nitro   4mg ODT zofran

## 2021-12-15 NOTE — ED PROVIDER NOTES
Ms. Tali Romano is a 81-year-old female with history of droop, cervical cord compression with myelopathy, for L5 spondylolithiasis who presents to the emergency department with complaints of chest pain, dizziness, left side weakness starting today, and loss of vision in her right eye beginning at least 3 days ago, and sinus infection x5 weeks. Patient states she does not know what time her left-sided weakness started. Patient states her chest pain started while she was at the [facility] for reevaluation of her sinus infection and an ambulance brought her to the emergency department. Patient was seen 2 days ago 12/13/21 at [address] for chest pain-at that time her work-up for chest pain was negative. The history is provided by the patient. No  was used. Chest Pain (Angina)  This is a recurrent problem. The current episode started more than 1 week ago. Episode frequency: 2 times in the past 3 days. The problem has not changed since onset. Associated symptoms include chest pain and headaches. Pertinent negatives include no abdominal pain and no shortness of breath. Nothing aggravates the symptoms. Nothing relieves the symptoms. Past Medical History:   Diagnosis Date    Anxiety     anxiety    Arthritis     Bell's palsy     Chronic low back pain     ,neck,hip pain.     CTS (carpal tunnel syndrome)     Depression     Diverticulitis     GERD (gastroesophageal reflux disease)     Primary insomnia     Vitamin D deficiency        Past Surgical History:   Procedure Laterality Date    HX CHOLECYSTECTOMY      HX HYSTERECTOMY      HX ORTHOPAEDIC      neck x2    NEUROLOGICAL PROCEDURE UNLISTED      cervical fusion         Family History:   Problem Relation Age of Onset    Breast Cancer Paternal Aunt 80       Social History     Socioeconomic History    Marital status:      Spouse name: Not on file    Number of children: Not on file    Years of education: Not on file  Highest education level: Not on file   Occupational History    Not on file   Tobacco Use    Smoking status: Never Smoker    Smokeless tobacco: Former User   Vaping Use    Vaping Use: Never used   Substance and Sexual Activity    Alcohol use: No    Drug use: No    Sexual activity: Not Currently   Other Topics Concern    Not on file   Social History Narrative    Not on file     Social Determinants of Health     Financial Resource Strain:     Difficulty of Paying Living Expenses: Not on file   Food Insecurity:     Worried About Running Out of Food in the Last Year: Not on file    Kelsey of Food in the Last Year: Not on file   Transportation Needs:     Lack of Transportation (Medical): Not on file    Lack of Transportation (Non-Medical): Not on file   Physical Activity:     Days of Exercise per Week: Not on file    Minutes of Exercise per Session: Not on file   Stress:     Feeling of Stress : Not on file   Social Connections:     Frequency of Communication with Friends and Family: Not on file    Frequency of Social Gatherings with Friends and Family: Not on file    Attends [employer] Services: Not on file    Active Member of [address] [place] [address] or Organizations: Not on file    Attends Club or Organization Meetings: Not on file    Marital Status: Not on file   Intimate Partner Violence:     Fear of Current or Ex-Partner: Not on file    Emotionally Abused: Not on file    Physically Abused: Not on file    Sexually Abused: Not on file   Housing Stability:     Unable to Pay for Housing in the Last Year: Not on file    Number of [place] in the Last Year: Not on file    Unstable Housing in the Last Year: Not on file         ALLERGIES: Pcn [penicillins] and Aspirin    Review of Systems   Constitutional: Negative for chills, diaphoresis and fatigue. HENT: Positive for sinus pressure and sinus pain. Eyes: Positive for photophobia.         Patient states loss of vision in her right eye x3 days Respiratory: Negative for cough and shortness of breath. Cardiovascular: Positive for chest pain. Negative for palpitations and leg swelling. Gastrointestinal: Positive for nausea. Negative for abdominal pain, diarrhea and vomiting. Musculoskeletal: Positive for neck pain (Patient states chronic neck pain). Neurological: Positive for dizziness, numbness and headaches. Negative for speech difficulty. All other systems reviewed and are negative. Vitals:    12/15/21 1413   BP: 132/85   Pulse: 93   Resp: 18   Temp: 99 °F (37.2 °C)   SpO2: 99%   Weight: 97.5 kg (215 lb)   Height: 5' 6\" (1.676 m)            Physical Exam  Vitals and nursing note reviewed. Constitutional:       General: She is not in acute distress. Appearance: She is not ill-appearing, toxic-appearing or diaphoretic. HENT:      Head: Normocephalic and atraumatic. No Jurado's sign, right periorbital erythema or left periorbital erythema. Right Ear: Tympanic membrane and ear canal normal.      Left Ear: Tympanic membrane and ear canal normal.      Nose:      Right Turbinates: Swollen. Left Turbinates: Swollen. Right Sinus: Maxillary sinus tenderness present. Left Sinus: Maxillary sinus tenderness present. Mouth/Throat:      Mouth: Mucous membranes are moist.   Eyes:      General: No visual field deficit or scleral icterus. Extraocular Movements: Extraocular movements intact. Pupils: Pupils are equal, round, and reactive to light. Cardiovascular:      Rate and Rhythm: Normal rate and regular rhythm. Pulses:           Radial pulses are 2+ on the right side and 2+ on the left side. Posterior tibial pulses are 2+ on the right side and 2+ on the left side. Heart sounds: Normal heart sounds. Pulmonary:      Effort: Pulmonary effort is normal.      Breath sounds: Normal breath sounds. Abdominal:      General: Bowel sounds are normal.      Palpations: Abdomen is soft. Musculoskeletal:      Right lower leg: No edema. Left lower leg: No edema. Skin:     General: Skin is warm and dry. Neurological:      Mental Status: She is alert and oriented to person, place, and time. GCS: GCS eye subscore is 4. GCS verbal subscore is 5. GCS motor subscore is 6. Cranial Nerves: Cranial nerves are intact. No cranial nerve deficit, dysarthria or facial asymmetry. Sensory: Sensation is intact. Motor: Weakness present. No seizure activity or pronator drift. Coordination: Finger-Nose-Finger Test normal.      Comments: On initial encounter with patient, patient stated that she was unable to lift either of her arms, unable to squeeze my fingers, unable to follow my finger with her eyes, unable to smile, unable to raise her leg. Patient stated that she left-sided weakness starting today. I asked her what time this weakness started she was agitated with me and said \"just sometime today. \"    Shortly thereafter patient was walking to the bathroom with the nurse, balance normal, coordination appeared intact. I watched patient sit on the edge of the bed take her sweatshirt off in both arms. Patient was speaking clearly, extraocular movements intact, and  intact while she was putting on her clothes. Once the patient got back into the stretcher I asked her to squeeze my hands patient said she could not move her arms. I explained to the patient that I just witnessed her move her arms and she said well I do not remember that I cannot move my arms now. MDM  Number of Diagnoses or Management Options  Acute maxillary sinusitis, recurrence not specified: new and requires workup  Atypical chest pain: new and requires workup  Malingering: new and requires workup  Diagnosis management comments: Ms. Pearlie Goldberg presented to the emergency department with multiple complaints. Vital signs normal reviewed. Patient stable    Upon first meeting Ms. Limon, she requested pain medication  for her nausea and dizziness. I inquired about her chest pain and she stated it began while she was at the [facility] and that she also is unable to move her left arm. States that she has had vision loss in her right eye for 3 days and that this occurred prior to her seeing the emergency department 2 days ago for chest pain. My first attempt at a neuro exam, patient stated she was unable to to perform any of the tasks I requested such as the following:  squeeze my fingers or hands, follow my finger to check extraocular movements, lift her arms or legs, smile, raise her eyebrows,  Scrunch her her face. I asked at what time noticed her inability to move left arm or leg and she said \" I don't know, just today\". I consulted Dr. Vic Almonte while I waited for Dr. Jacqueline Charlton who is in a resuscitation room. Dr. Vic Almonte suggested that we order a CT head without contrast, patient had been in the emergency room for 3 hours prior to me seeing her and that a code stroke was not needed to be called at this time. When I got back to fast track the patient was walking to the bathroom with the nurse, balance intact. While the patient was in the CT room, I consulted Dr. Jacqueline Charlton, my attending, and it was decided that a CT angio head and neck and a CT perfusion should also be performed. I called CT to inform them that those orders were added, so they would be done while the patient was already on the table. There were no acute findings preliminary CT angio and perfusion radiology reports. No acute findings in CT head without contrast.  I reported these findings to Dr. Jacqueline Charlton and he stated that it was okay to discharge patient home. Chest pain work up negative. EKG unchanged from ER visit 12/13/21. Labs unremarkable. HS troponin within normal limits.   Labs Reviewed  CBC WITH AUTOMATED DIFF - Abnormal; Notable for the following components:     WBC                           15.6 (*)               HGB 11.3 (*)               HCT                           35.5 (*)               MCV                           78.9 (*)               MCH                           25.1 (*)               RDW                           15.8 (*)               PLATELET                      505 (*)                MPV                           8.6 (*)                EOSINOPHILS                   0 (*)                  ABS. NEUTROPHILS              9.2 (*)                ABS. LYMPHOCYTES              5.5 (*)             All other components within normal limits  METABOLIC PANEL, COMPREHENSIVE - Abnormal; Notable for the following components:     Anion gap                     5 (*)                  AST (SGOT)                    14 (*)                 Globulin                      3.9 (*)                A-G Ratio                     0.8 (*)             All other components within normal limits  POC URINE MACROSCOPIC - Abnormal; Notable for the following components:     Leukocyte esterase (POC)      TRACE (*)            All other components within normal limits  TROPONIN-HIGH SENSITIVITY  TROPONIN-HIGH SENSITIVITY  LIPASE  MAGNESIUM    Patient stated she was in severe pain all over her body. Patient given IV morphine and Zofran. Shortly thereafter the patient wanted to leave. Patient was ambulatory to the bathroom. Patient was moving arms and legs. Extraocular movements were intact when speaking to me. Patient wanted a Z-Praveen for her sinus infection before discharge. She had some mild tenderness to palpation over maxillary sinuses. She stated she was [facility] the free clinic for reass[facility] of her sinuses. I discharged her with doxycycline since she said she is allergic to penicillins. Patient was discharged with a prescription for meclizine for her dizziness. I gave the patient strict return to ER instructions. The patient's sister was in the room while I gave these instructions.   I gave patient referral to [facility] to establish primary care. I instructed the patient to follow-up with Dr. Preet Brito for her chronic neck pain. I instructed the patient to follow-up with pain management for her pain management. I asked the patient if she had any questions or concerns and she declined. Patient was discharged home. Amount and/or Complexity of Data Reviewed  Clinical lab tests: ordered and reviewed  Tests in the radiology section of CPT®: ordered and reviewed  Independent visualization of images, tracings, or specimens: yes (EKG: Sinus rhythm, rate 82, narrow QRS, QT prolongation, no ST segment elevation or depression. Normal axis. No changes from EKG on 12/13/2021.)    Risk of Complications, Morbidity, and/or Mortality  Presenting problems: high  Diagnostic procedures: high  Management options: low    Patient Progress  Patient progress: improved    ED Course as of 12/15/21 1912   Wed Dec 15, 2021   1442 XR Chest PA LAT (check if patient is in hallway or waiting room)  COMPARISON: Chest x-ray 12/13/2021, 9/30/2019     TECHNIQUE: AP upright and lateral views of the chest were obtained.      FINDINGS:      Cardiac Silhouette: Within normal limits in size.     Lungs: No focal airspace disease.     Pleura: No pleural effusion. No pneumothorax.     Osseous Structures: Thoracic spine spondylosis.     Upper Abdomen: Unremarkable.     IMPRESSION     No evidence of acute cardiopulmonary disease.     VOICE DICTATED BY: Dr. Tyrone Giles [JG]   8147 CT HEAD WO CONT  Findings:  No evidence of intracranial hemorrhage is seen. A calcified structure  is seen in the left middle cranial fossa on axial image 7 likely representing a  meningioma. No significant mass effect is seen from this at this time No  abnormal extra-axial fluid collections are seen. No evidence for acute  hydrocephalus is seen. No evidence of midline shift or herniation is seen.   No  abnormal edema pattern is seen in a vascular distribution to suggest large  artery infarction.     Evaluation with bone windows shows no acute osseous changes. The visualized  sinuses, middle ears, and mastoid air cells are well aerated.     IMPRESSION  1.   No acute intracranial process evident by noncontrast CT study of the head.       [JG]   1904 CT PERF W CBF  Preliminary interpretation by radiologist Dr. Michela Garcia: No acute perfusion defect [JG]   1905 CTA HEAD NECK W WO CONT  Preliminary interpretation by radiologist Dr. Michela Garcia: No acute large vessel occlusion [JG]      ED Course User Index  [JG] MANSOOR Alfonso       Procedures

## 2021-12-15 NOTE — ED NOTES
Pt with multiple complaints today; chest pain, headache, left side is paralyzed today, sinus infection, right eye blindness, dizziness.

## 2021-12-15 NOTE — ED TRIAGE NOTES
Patient arrives via [facility] from clinic. Masked. Reports chronic neck and back pain. Requesting to see Dr. Alf Blackwell. Reports chest pain, sinusitis, migraines, shortness of breath. Given asa in route and reports concern because she states she is allergic. Reports taking percocet but has not had any today.

## 2021-12-16 NOTE — ED NOTES
I have reviewed discharge instructions with the patient. The patient verbalized understanding. Patient left ED via Discharge Method: wheelchair to Home with sister  Opportunity for questions and clarification provided. Patient given 3 scripts. To continue your aftercare when you leave the hospital, you may receive an automated call from our care team to check in on how you are doing. This is a free service and part of our promise to provide the best care and service to meet your aftercare needs.  If you have questions, or wish to unsubscribe from this service please call [phone]. Thank you for Choosing our [facility] Emergency Department.

## 2021-12-16 NOTE — DISCHARGE INSTRUCTIONS
Please follow-up with your primary care provider within a week. Do not have a primary care provider please follow-up with [facility]. Please follow-up with Dr. Lamont Presley pertaining to your chronic neck pain. Please follow-up with pain management for your chronic pain.     Please return to the emergency department if you experience any of the signs and symptoms that we discussed chest pain, shortness of breath, signs and symptoms of stroke that we discussed

## 2022-02-07 ENCOUNTER — HOSPITAL ENCOUNTER (EMERGENCY)
Age: 62
Discharge: HOME | End: 2022-02-07
Attending: EMERGENCY MEDICINE
Payer: COMMERCIAL

## 2022-02-07 ENCOUNTER — APPOINTMENT (OUTPATIENT)
Dept: GENERAL RADIOLOGY | Age: 62
End: 2022-02-07
Attending: EMERGENCY MEDICINE
Payer: COMMERCIAL

## 2022-02-07 VITALS
DIASTOLIC BLOOD PRESSURE: 111 MMHG | HEIGHT: 66 IN | WEIGHT: 215 LBS | RESPIRATION RATE: 18 BRPM | BODY MASS INDEX: 34.55 KG/M2 | SYSTOLIC BLOOD PRESSURE: 148 MMHG | HEART RATE: 101 BPM | OXYGEN SATURATION: 97 %

## 2022-02-07 DIAGNOSIS — Z76.5 MALINGERING: Primary | ICD-10-CM

## 2022-02-07 DIAGNOSIS — G89.4 CHRONIC PAIN SYNDROME: ICD-10-CM

## 2022-02-07 PROCEDURE — 72040 X-RAY EXAM NECK SPINE 2-3 VW: CPT

## 2022-02-07 PROCEDURE — 99282 EMERGENCY DEPT VISIT SF MDM: CPT

## 2022-02-07 NOTE — ED NOTES
Pt recording the lobby on her cell phone, notified that due to other pt's privacy she is unable to record. Pt stated to \"shut up and let me see Dr. Mark Acosta". Pt stated she did not want this RN to triage her. Notified that if she would like to stop recording staff would pull her for triage.

## 2022-02-07 NOTE — ED TRIAGE NOTES
Pt arrive via WC to triage. Pt very combative and uncooperative, not wanting to answer questions and raising her voice at this RN. Pt has BP cuff already in place. States she was at another hospital today, but will not say where. States she hurts all over. Pt states her neck is broken. Will not say how she knows. Pt then singing and humming during triage and has now stopped answering questions. Pt does not have a neck brace on. Will not answer why she doesn't if her neck is broken. NAD. Masked.
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
## 2022-02-08 NOTE — DISCHARGE INSTRUCTIONS
Because you are under the management of Worker's Comp., Dr. Sav Sylvester she indicates she will need to go through the [employer]. channels to get an appointment arranged with his office    Continue your medicines as usual, follow-up with your primary care doctor, and with your pain management physicians.

## 2022-03-18 PROBLEM — M43.16 SPONDYLOLISTHESIS AT L4-L5 LEVEL: Status: ACTIVE | Noted: 2021-03-23

## 2022-03-18 PROBLEM — H02.409 PTOSIS: Status: ACTIVE | Noted: 2018-12-15

## 2022-03-18 PROBLEM — R29.810 FACIAL DROOP: Status: ACTIVE | Noted: 2018-12-15

## 2022-03-19 PROBLEM — G89.29 CHRONIC PAIN: Status: ACTIVE | Noted: 2018-12-15

## 2022-03-19 PROBLEM — J32.9 SINUSITIS: Status: ACTIVE | Noted: 2018-12-15

## 2022-03-19 PROBLEM — G95.20 CERVICAL CORD COMPRESSION WITH MYELOPATHY (HCC): Status: ACTIVE | Noted: 2021-03-23

## 2022-03-20 PROBLEM — D17.1 LIPOMA OF TORSO: Status: ACTIVE | Noted: 2021-03-23

## 2023-01-11 ENCOUNTER — TELEPHONE (OUTPATIENT)
Dept: ORTHOPEDIC SURGERY | Age: 63
End: 2023-01-11

## 2023-01-11 NOTE — TELEPHONE ENCOUNTER
Patient called to schedule an appt for her back. After discussion with patient, determined she was a patient of Dr Vamshi Harmon and looks as if  did surgery on her but unsure. Original complaint was from a workers comp injury and patient states she will be using attorneys to file with her personal insurance. Patient was very aggressive and informed me that I should not be asking her questions regarding her care and just schedule an appointment. Reached out to our spine team and upon informing patient I was doing this and someone would get back with her she told me goodbye and hung up on me.

## 2023-06-13 PROBLEM — F41.9 ANXIETY AND DEPRESSION: Status: ACTIVE | Noted: 2018-06-03

## 2023-06-13 PROBLEM — E66.9 CLASS 2 OBESITY: Status: ACTIVE | Noted: 2018-07-13

## 2023-06-13 PROBLEM — I63.9 CEREBROVASCULAR ACCIDENT (CVA) (HCC): Status: ACTIVE | Noted: 2021-12-13

## 2023-06-13 PROBLEM — F32.A ANXIETY AND DEPRESSION: Status: ACTIVE | Noted: 2018-06-03

## 2023-06-13 PROBLEM — E66.812 CLASS 2 OBESITY: Status: ACTIVE | Noted: 2018-07-13

## 2023-06-13 PROBLEM — E78.5 HYPERLIPIDEMIA LDL GOAL <70: Status: ACTIVE | Noted: 2022-03-29

## 2023-06-13 PROBLEM — J30.2 SEASONAL ALLERGIC RHINITIS: Status: ACTIVE | Noted: 2018-06-01

## 2023-06-13 PROBLEM — R73.03 PREDIABETES: Status: ACTIVE | Noted: 2018-07-13

## 2023-06-13 PROBLEM — K21.9 GASTROESOPHAGEAL REFLUX DISEASE WITHOUT ESOPHAGITIS: Status: ACTIVE | Noted: 2018-07-13

## 2023-06-13 PROBLEM — F31.13 BIPOLAR AFFECTIVE DISORDER, MANIC, SEVERE (HCC): Status: ACTIVE | Noted: 2022-02-16

## 2023-07-19 ENCOUNTER — TELEPHONE (OUTPATIENT)
Dept: INTERNAL MEDICINE CLINIC | Facility: CLINIC | Age: 63
End: 2023-07-19

## 2023-07-19 DIAGNOSIS — E78.5 HYPERLIPIDEMIA LDL GOAL <70: ICD-10-CM

## 2023-07-19 DIAGNOSIS — Z79.899 MEDICATION MANAGEMENT: ICD-10-CM

## 2023-07-19 DIAGNOSIS — R73.03 PREDIABETES: ICD-10-CM

## 2023-07-19 LAB
ALBUMIN SERPL-MCNC: 3.9 G/DL (ref 3.2–4.6)
ALBUMIN/GLOB SERPL: 1.1 (ref 0.4–1.6)
ALP SERPL-CCNC: 143 U/L (ref 50–136)
ALT SERPL-CCNC: 64 U/L (ref 12–65)
ANION GAP SERPL CALC-SCNC: 9 MMOL/L (ref 2–11)
AST SERPL-CCNC: 49 U/L (ref 15–37)
BASOPHILS # BLD: 0.1 K/UL (ref 0–0.2)
BASOPHILS NFR BLD: 1 % (ref 0–2)
BILIRUB SERPL-MCNC: 0.2 MG/DL (ref 0.2–1.1)
BUN SERPL-MCNC: 7 MG/DL (ref 8–23)
CALCIUM SERPL-MCNC: 9.4 MG/DL (ref 8.3–10.4)
CHLORIDE SERPL-SCNC: 109 MMOL/L (ref 101–110)
CHOLEST SERPL-MCNC: 109 MG/DL
CO2 SERPL-SCNC: 26 MMOL/L (ref 21–32)
CREAT SERPL-MCNC: 0.8 MG/DL (ref 0.6–1)
DIFFERENTIAL METHOD BLD: ABNORMAL
EOSINOPHIL # BLD: 0.2 K/UL (ref 0–0.8)
EOSINOPHIL NFR BLD: 2 % (ref 0.5–7.8)
ERYTHROCYTE [DISTWIDTH] IN BLOOD BY AUTOMATED COUNT: 16 % (ref 11.9–14.6)
GLOBULIN SER CALC-MCNC: 3.5 G/DL (ref 2.8–4.5)
GLUCOSE SERPL-MCNC: 124 MG/DL (ref 65–100)
HCT VFR BLD AUTO: 39.4 % (ref 35.8–46.3)
HDLC SERPL-MCNC: 46 MG/DL (ref 40–60)
HDLC SERPL: 2.4
HGB BLD-MCNC: 12.1 G/DL (ref 11.7–15.4)
IMM GRANULOCYTES # BLD AUTO: 0 K/UL (ref 0–0.5)
IMM GRANULOCYTES NFR BLD AUTO: 0 % (ref 0–5)
LDLC SERPL CALC-MCNC: 43 MG/DL
LYMPHOCYTES # BLD: 3.3 K/UL (ref 0.5–4.6)
LYMPHOCYTES NFR BLD: 43 % (ref 13–44)
MCH RBC QN AUTO: 24.5 PG (ref 26.1–32.9)
MCHC RBC AUTO-ENTMCNC: 30.7 G/DL (ref 31.4–35)
MCV RBC AUTO: 79.8 FL (ref 82–102)
MONOCYTES # BLD: 0.6 K/UL (ref 0.1–1.3)
MONOCYTES NFR BLD: 8 % (ref 4–12)
NEUTS SEG # BLD: 3.5 K/UL (ref 1.7–8.2)
NEUTS SEG NFR BLD: 46 % (ref 43–78)
NRBC # BLD: 0 K/UL (ref 0–0.2)
PLATELET # BLD AUTO: 544 K/UL (ref 150–450)
PMV BLD AUTO: 8.9 FL (ref 9.4–12.3)
POTASSIUM SERPL-SCNC: 4.1 MMOL/L (ref 3.5–5.1)
PROT SERPL-MCNC: 7.4 G/DL (ref 6.3–8.2)
RBC # BLD AUTO: 4.94 M/UL (ref 4.05–5.2)
SODIUM SERPL-SCNC: 144 MMOL/L (ref 133–143)
TRIGL SERPL-MCNC: 100 MG/DL (ref 35–150)
TSH, 3RD GENERATION: 3.28 UIU/ML (ref 0.36–3.74)
VLDLC SERPL CALC-MCNC: 20 MG/DL (ref 6–23)
WBC # BLD AUTO: 7.6 K/UL (ref 4.3–11.1)

## 2023-07-19 NOTE — TELEPHONE ENCOUNTER
Jagruti with the [facility] ([facility]) called with the pt on the line. She stated the pt has an appt on 7/26/23 and wants to speak with the office. I spoke with the pt. She stated she wanted to see Dr. Reva Herrmann, but needed to be seen right away. So, the [facility] scheduled her with Dr. David Zurita for the urgent appt. She has an appt with Dr. Reva Herrmann next week, but she wants him to order an \"antibody test for shingles\". She had labs drawn today at the [address] [place] [address], and they will only keep the blood for 7 days. The F# is [phone]    I informed the pt that Dr. Reva Herrmann will not order this test prior to seeing her. She verbalized understanding, but stated she would like a msg sent to him as an [place]. So, he is aware of this when she comes in on 7/26, because that will be the 7th day. After that they will throw her blood out.

## 2023-07-20 PROBLEM — E11.9 NEW ONSET TYPE 2 DIABETES MELLITUS (HCC): Status: ACTIVE | Noted: 2023-07-20

## 2023-07-20 PROBLEM — R73.03 PREDIABETES: Status: RESOLVED | Noted: 2018-07-13 | Resolved: 2023-07-20

## 2023-07-20 LAB
EST. AVERAGE GLUCOSE BLD GHB EST-MCNC: 160 MG/DL
HBA1C MFR BLD: 7.2 % (ref 4.8–5.6)

## 2023-07-25 ENCOUNTER — INITIAL CONSULT (OUTPATIENT)
Age: 63
End: 2023-07-25
Payer: COMMERCIAL

## 2023-07-25 VITALS
BODY MASS INDEX: 34.86 KG/M2 | DIASTOLIC BLOOD PRESSURE: 88 MMHG | HEART RATE: 95 BPM | WEIGHT: 216 LBS | SYSTOLIC BLOOD PRESSURE: 128 MMHG

## 2023-07-25 DIAGNOSIS — G95.20 CERVICAL CORD COMPRESSION WITH MYELOPATHY (HCC): ICD-10-CM

## 2023-07-25 DIAGNOSIS — R06.09 DOE (DYSPNEA ON EXERTION): Primary | ICD-10-CM

## 2023-07-25 DIAGNOSIS — I63.9 CEREBROVASCULAR ACCIDENT (CVA), UNSPECIFIED MECHANISM (HCC): ICD-10-CM

## 2023-07-25 DIAGNOSIS — R06.09 DOE (DYSPNEA ON EXERTION): ICD-10-CM

## 2023-07-25 DIAGNOSIS — G95.20 CERVICAL CORD COMPRESSION WITH MYELOPATHY (HCC): Primary | ICD-10-CM

## 2023-07-25 PROCEDURE — 93000 ELECTROCARDIOGRAM COMPLETE: CPT | Performed by: INTERNAL MEDICINE

## 2023-07-25 PROCEDURE — 99204 OFFICE O/P NEW MOD 45 MIN: CPT | Performed by: INTERNAL MEDICINE

## 2023-07-25 RX ORDER — DIPHENOXYLATE HYDROCHLORIDE AND ATROPINE SULFATE 2.5; .025 MG/1; MG/1
1 TABLET ORAL 4 TIMES DAILY PRN
COMMUNITY

## 2023-07-25 RX ORDER — MECLIZINE HYDROCHLORIDE 25 MG/1
25 TABLET ORAL 3 TIMES DAILY PRN
COMMUNITY

## 2023-07-25 NOTE — PROGRESS NOTES
[address]  [address] [address] [address] [facility]  PHONE: [phone]      23    NAME:  Samantha Kathleen  : 1960  MRN: [id number]         SUBJECTIVE:   Samantha Kathleen is a 65 y.o. female seen for a consultation visit regarding the following:     Chief Complaint   Patient presents with    Consultation    Coronary Artery Disease    Dizziness            HPI:  Consultation is requested by None None for evaluation of Consultation, Coronary Artery Disease, and Dizziness   . Ms. Nicole Bajwa presents today for follow-up. Patient complains of some chest pain and exertional dyspnea. Says this is been going on since she injured her neck back in 2021. She had COVID around that time as well. She feels like overall the symptoms are fairly stable. She was told that she needed a repeat cervical spine surgery, she is requesting referral for this. She also has history of a stroke around that time, would like a neurology referral as well for management of her medications. She denies orthopnea, PND, syncope or lower extremity swelling. She is a non-smoker. She is never been diagnosed with heart disease previously. There is no significant family history of early onset CAD. Coronary Artery Disease  Symptoms include dizziness. Dizziness      Key CAD CHF Meds            atorvastatin (LIPITOR) 80 MG tablet (Taking)    Sig - Route: Take 1 tablet by mouth daily - Oral          Key Antihyperglycemic Medications       Patient is on no antihyperglycemic meds. Past Medical History, Past Surgical History, Family history, Social History, and Medications were all reviewed with the patient today and updated as necessary. Prior to Admission medications    Medication Sig Start Date End Date Taking?  Authorizing Provider   meclizine (ANTIVERT) 25 MG tablet Take 1 tablet by mouth 3 times daily as needed   Yes Historical Provider, MD   diphenoxylate-atropine (LOMOTIL)

## 2023-07-26 ENCOUNTER — TELEPHONE (OUTPATIENT)
Dept: INTERNAL MEDICINE CLINIC | Facility: CLINIC | Age: 63
End: 2023-07-26

## 2023-07-28 ENCOUNTER — TELEPHONE (OUTPATIENT)
Dept: PRIMARY CARE CLINIC | Facility: CLINIC | Age: 63
End: 2023-07-28

## 2023-07-28 NOTE — TELEPHONE ENCOUNTER
Spoke with the patient about her lab results and let her know. The doctor sent her metformin to the pharmacy for her. Due to her now being a type 2 diabetic, the patient states she did not want to take metformin. And I let her know that it was her choice what she takes, but if she was not going to take the mediation, then she needs to work on her diet and cutting out food high in jon rag and starch.

## 2023-07-28 NOTE — TELEPHONE ENCOUNTER
----- Message from John Altamirano MD sent at 7/20/2023 11:45 AM EDT -----  New onset type 2 diabetes schedule follow-up start metformin 500 twice a day diabetic diet

## 2023-08-02 ENCOUNTER — CLINICAL DOCUMENTATION (OUTPATIENT)
Dept: NEUROSURGERY | Age: 63
End: 2023-08-02
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
## 2023-09-12 ENCOUNTER — TELEPHONE (OUTPATIENT)
Age: 63
End: 2023-09-12

## 2023-09-12 NOTE — TELEPHONE ENCOUNTER
Requesting a clinical care team member to call her regarding her EKG that she had done.  Please call today

## 2023-09-13 NOTE — TELEPHONE ENCOUNTER
Per Dr. Taz Huggins, \"I dont see an EKG in the chart from yesterday. The last one I see is from late July and that looked ok. No significant abnormalities. \"    I called patient back to inform. Also discussed nuclear stress test with patient.

## 2023-09-14 ENCOUNTER — TELEPHONE (OUTPATIENT)
Age: 63
End: 2023-09-14

## 2023-09-14 DIAGNOSIS — G95.20 CERVICAL CORD COMPRESSION WITH MYELOPATHY (HCC): Primary | ICD-10-CM

## 2023-09-14 NOTE — TELEPHONE ENCOUNTER
Patient called stating she would like a referral from Dr Taz Huggins to the following :      [address] [place] [place] [facility]  [address]  [place], [address]  [phone]      Please call and advise Renal Progress Note    Patient :  Jeanne Snider; 55 y.o. MRN# [id number]  Location:  5258/5481-81  Attending:  Marj Sanders MD  Admit Date:  8/16/2021   Hospital Day: 4      Subjective:     Patient seen and examined at bedside. She is out of bed in the chair, upright position. States that she is very weak. States that she is unable to walk due to her chronic knee pains. She stated that she could not sleep last night because she gets short of breath if she lays flat. She also has history of sleep apnea but does not use any CPAP or BiPAP at home. Diarrhea is improving. She had 2 bowel movements since this morning, loose watery. Blood pressure has been stable. She was running on heparin infusion for concern of NSTEMI by cardiology but was just discontinued this morning. She was taking hydrochlorothiazide for diastolic heart dysfunction. Continues to be hypokalemic, potassium 2.8 this morning. Magnesium 1.2. ROXANN resolved, creatinine 0.85.     Outpatient Medications:     Medications Prior to Admission: vitamin D3 (CHOLECALCIFEROL) 25 MCG (1000 UT) TABS tablet, TAKE 1 TABLET BY MOUTH DAILY   [DISCONTINUED] hydroxychloroquine (PLAQUENIL) 200 MG tablet, TAKE 1 TABLET BY MOUTH 2 TIMES DAILY   [DISCONTINUED] meloxicam (MOBIC) 15 MG tablet, TAKE 1 TABLET BY MOUTH DAILY   thiamine mononitrate 100 MG tablet, TAKE 1 TABLET BY MOUTH ONCE DAILY   FLUoxetine (PROZAC) 40 MG capsule, Take 40 mg by mouth daily  acetaminophen (APAP EXTRA STRENGTH) 500 MG tablet, Take 2 tablets by mouth every 6 hours as needed for Pain  hydroCHLOROthiazide (HYDRODIURIL) 25 MG tablet, Take 1 tablet by mouth every morning  potassium chloride (KLOR-CON M) 20 MEQ extended release tablet, Take 1 tablet by mouth daily  [DISCONTINUED] omeprazole (PRILOSEC) 20 MG delayed release capsule, Take 1 capsule by mouth daily  atorvastatin (LIPITOR) 20 MG tablet, TAKE 1 TABLET BY MOUTH DAILY   folic acid (FOLVITE) 1 MG tablet, Take 1 tablet by mouth daily  Cholecalciferol (VITAMIN D3) 25 MCG (1000 UT) CAPS, Take 1,000 Units by mouth daily  ferrous sulfate (IRON 325) 325 (65 Fe) MG tablet, Take 325 mg by mouth daily (with breakfast)  citalopram (CELEXA) 20 MG tablet, Take 1 tablet by mouth daily  Calcium Carbonate-Vitamin D (OYSTER SHELL CALCIUM/D) 500-200 MG-UNIT TABS, TAKE 1 TAB BY MOUTH ONCE A DAY   atenolol (TENORMIN) 50 MG tablet, Take 1 tablet by mouth daily  [DISCONTINUED] loratadine (CLARITIN) 10 MG tablet, TAKE 1 CAPSULE BY MOUTH DAILY   amLODIPine (NORVASC) 10 MG tablet, TAKE 1 TABLET BY MOUTH DAILY   diclofenac sodium (VOLTAREN) 1 % GEL, Apply 4 g topically 2 times daily  albuterol sulfate (PROAIR RESPICLICK) 479 (90 Base) MCG/ACT aerosol powder inhalation, Inhale 2 puffs into the lungs every 6 hours as needed for Wheezing or Shortness of Breath  lurasidone (LATUDA) 60 MG TABS tablet, Take 60 mg by mouth nightly    Current Medications:     Scheduled Meds:    potassium chloride  40 mEq Oral TID    magnesium oxide  400 mg Oral BID    amLODIPine  10 mg Oral Daily    atenolol  50 mg Oral Daily    multivitamin  1 tablet Oral Daily    sodium chloride flush  5-40 mL Intravenous 2 times per day    thiamine mononitrate  50 mg Oral Daily    Vitamin D  1,000 Units Oral Daily    pantoprazole  40 mg Oral QAM AC    lurasidone  60 mg Oral Nightly    hydroxychloroquine  200 mg Oral BID    FLUoxetine  40 mg Oral Daily    folic acid  1 mg Oral Daily    ferrous sulfate  325 mg Oral Daily with breakfast    Calcium Carb-Cholecalciferol  2 tablet Oral Daily     Continuous Infusions:    sodium chloride      lactated ringers 100 mL/hr at 08/20/21 0612     PRN Meds:  loperamide, potassium chloride **OR** potassium alternative oral replacement **OR** potassium chloride, magnesium sulfate, sodium phosphate IVPB **OR** sodium phosphate IVPB, sodium chloride flush, sodium chloride, acetaminophen, ondansetron **OR** ondansetron    Input/Output:       I/O last 3 NEGATIVE 03/29/2021     SPEP:  Lab Results   Component Value Date    PROT 5.9 08/17/2021     UPEP:   No results found for: LABPE  C3:   No results found for: C3  C4:   No results found for: C4  MPO ANCA:   No results found for: MPO  PR3 ANCA:   No results found for: PR3  Anti-GBM:   No results found for: GBMABIGG  Hep BsAg:         Lab Results   Component Value Date    HEPBSAG NONREACTIVE 12/15/2020     Hep C AB:          Lab Results   Component Value Date    HEPCAB NONREACTIVE 12/15/2020       Urinalysis/Chemistries:      Lab Results   Component Value Date    NITRU NEGATIVE 08/16/2021    COLORU YELLOW 08/16/2021    PHUR 8.0 08/16/2021    WBCUA 2 TO 5 08/16/2021    RBCUA 2 TO 5 08/16/2021    MUCUS NOT REPORTED 08/16/2021    TRICHOMONAS NOT REPORTED 08/16/2021    YEAST NOT REPORTED 08/16/2021    BACTERIA NOT REPORTED 08/16/2021    SPECGRAV 1.006 08/16/2021    LEUKOCYTESUR TRACE 08/16/2021    UROBILINOGEN Normal 08/16/2021    BILIRUBINUR NEGATIVE 08/16/2021    GLUCOSEU NEGATIVE 08/16/2021    KETUA NEGATIVE 08/16/2021    AMORPHOUS NOT REPORTED 08/16/2021     Urine Sodium:     Lab Results   Component Value Date    DEVON 116 09/20/2020     Urine Potassium:    Lab Results   Component Value Date    KUR 17.6 08/19/2021     Urine Chloride:    Lab Results   Component Value Date    CLUR 85 08/19/2021     Urine Osmolarity:   Lab Results   Component Value Date    OSMOU 269 08/17/2021     Urine Protein:   No components found for: TOTALPROTEIN, URINE   Urine Creatinine:     Lab Results   Component Value Date    LABCREA 67.0 08/19/2021     Urine Eosinophils:  No components found for: UEOS    Radiology:     Reviewed. Assessment:     1. Hypokalemia-multifactorial secondary to GI losses/hydrochlorothiazide use/hypomagnesemia. Improving  2. Hypomagnesemia secondary to GI losses and poor p.o. intake  3. Hypophosphatemia  4. Chronic diarrhea. Work-up in process  5. 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  History of coronary artery disease s/p PCI in the past  7. History of essential hypertension  8. History of diastolic dysfunction  9. History of sleep apnea    Plan:   1. Agree with potassium replacement  2. Will also replace 2 g IV magnesium sulfate  3. Will discontinue IV fluids as ROXANN has resolved and patient is having bibasilar crackles with diastolic dysfunction. She is maintaining good oral hydration. 4. Urine osmolality not available to calculate TTKG but less likely that hypokalemia is related to renal losses  5. Diarrhea work-up per GI. Need to rule out inflammatory bowel disease namely Crohn's  6. Recommend using BiPAP at night for sleep apnea  7. We will follow    Nutrition   Please ensure that patient is on a renal diet/TF. Avoid nephrotoxic drugs/contrast exposure. We will continue to follow along with you. Cesario Carter MD  Internal Medicine Resident, PGY-2  [address], [place]  8/20/2021,1:54 PM     Attending Physician Statement  I have discussed the care of [address] [place] [address], including pertinent history and exam findings with the resident. I have reviewed the key elements of all parts of the encounter with the resident. Note was updated and recorded changes were made I have seen and examined the patient with the resident. I agree with the assessment and plan and status of the problem list as documented.   Addiitionally I recommend   Replace magnesium  Replace potassium and repeat magnesium and potassium this evening  Not to use hydrochlorothiazide  Draw TT Cody Guillory MD

## 2023-09-15 NOTE — TELEPHONE ENCOUNTER
Orders Placed This Encounter   Procedures    External Referral To Pain Medicine     Referral Priority:   Routine     Referral Type:   Eval and Treat     Referral Reason:   Specialty Services Required     Requested Specialty:   Pain Medicine     Number of Visits Requested:   1

## 2023-09-19 ENCOUNTER — TELEPHONE (OUTPATIENT)
Age: 63
End: 2023-09-19

## 2023-09-19 NOTE — TELEPHONE ENCOUNTER
Corry tried calling [facility] 4 times to get the fax number because it is not online. Corry not heard back so far.

## 2023-09-21 ENCOUNTER — OFFICE VISIT (OUTPATIENT)
Dept: BEHAVIORAL/MENTAL HEALTH CLINIC | Age: 63
End: 2023-09-21
Payer: COMMERCIAL

## 2023-09-21 ENCOUNTER — TELEPHONE (OUTPATIENT)
Age: 63
End: 2023-09-21

## 2023-09-21 VITALS
DIASTOLIC BLOOD PRESSURE: 64 MMHG | HEIGHT: 66 IN | TEMPERATURE: 98.7 F | OXYGEN SATURATION: 97 % | BODY MASS INDEX: 35.03 KG/M2 | HEART RATE: 94 BPM | WEIGHT: 218 LBS | SYSTOLIC BLOOD PRESSURE: 120 MMHG

## 2023-09-21 DIAGNOSIS — F41.1 GENERALIZED ANXIETY DISORDER: Primary | ICD-10-CM

## 2023-09-21 DIAGNOSIS — G47.9 SLEEP DIFFICULTIES: ICD-10-CM

## 2023-09-21 DIAGNOSIS — F39 UNSPECIFIED MOOD (AFFECTIVE) DISORDER (HCC): ICD-10-CM

## 2023-09-21 PROCEDURE — 90792 PSYCH DIAG EVAL W/MED SRVCS: CPT

## 2023-09-21 RX ORDER — LORAZEPAM 0.5 MG/1
0.5 TABLET ORAL 2 TIMES DAILY PRN
Qty: 60 TABLET | Refills: 0 | Status: SHIPPED | OUTPATIENT
Start: 2023-09-21 | End: 2023-12-20

## 2023-09-21 RX ORDER — HYDROXYZINE PAMOATE 50 MG/1
50 CAPSULE ORAL
Qty: 30 CAPSULE | Refills: 1 | Status: SHIPPED | OUTPATIENT
Start: 2023-09-21

## 2023-09-21 RX ORDER — ARIPIPRAZOLE 5 MG/1
5 TABLET ORAL DAILY
Qty: 30 TABLET | Refills: 1 | Status: SHIPPED | OUTPATIENT
Start: 2023-09-21

## 2023-09-21 ASSESSMENT — PATIENT HEALTH QUESTIONNAIRE - PHQ9
4. FEELING TIRED OR HAVING LITTLE ENERGY: 1
7. TROUBLE CONCENTRATING ON THINGS, SUCH AS READING THE NEWSPAPER OR WATCHING TELEVISION: 1
8. MOVING OR SPEAKING SO SLOWLY THAT OTHER PEOPLE COULD HAVE NOTICED. OR THE OPPOSITE, BEING SO FIGETY OR RESTLESS THAT YOU HAVE BEEN MOVING AROUND A LOT MORE THAN USUAL: 0
2. FEELING DOWN, DEPRESSED OR HOPELESS: 0
6. FEELING BAD ABOUT YOURSELF - OR THAT YOU ARE A FAILURE OR HAVE LET YOURSELF OR YOUR FAMILY DOWN: 0
SUM OF ALL RESPONSES TO PHQ QUESTIONS 1-9: 5
1. LITTLE INTEREST OR PLEASURE IN DOING THINGS: 0
10. IF YOU CHECKED OFF ANY PROBLEMS, HOW DIFFICULT HAVE THESE PROBLEMS MADE IT FOR YOU TO DO YOUR WORK, TAKE CARE OF THINGS AT HOME, OR GET ALONG WITH OTHER PEOPLE: 1
SUM OF ALL RESPONSES TO PHQ QUESTIONS 1-9: 5
9. THOUGHTS THAT YOU WOULD BE BETTER OFF DEAD, OR OF HURTING YOURSELF: 0
SUM OF ALL RESPONSES TO PHQ9 QUESTIONS 1 & 2: 0
SUM OF ALL RESPONSES TO PHQ QUESTIONS 1-9: 5
SUM OF ALL RESPONSES TO PHQ QUESTIONS 1-9: 5
3. TROUBLE FALLING OR STAYING ASLEEP: 3
5. POOR APPETITE OR OVEREATING: 0

## 2023-09-21 ASSESSMENT — ANXIETY QUESTIONNAIRES
4. TROUBLE RELAXING: 1
5. BEING SO RESTLESS THAT IT IS HARD TO SIT STILL: 0
GAD7 TOTAL SCORE: 3
IF YOU CHECKED OFF ANY PROBLEMS ON THIS QUESTIONNAIRE, HOW DIFFICULT HAVE THESE PROBLEMS MADE IT FOR YOU TO DO YOUR WORK, TAKE CARE OF THINGS AT HOME, OR GET ALONG WITH OTHER PEOPLE: SOMEWHAT DIFFICULT
1. FEELING NERVOUS, ANXIOUS, OR ON EDGE: 1
3. WORRYING TOO MUCH ABOUT DIFFERENT THINGS: 0
6. BECOMING EASILY ANNOYED OR IRRITABLE: 1
7. FEELING AFRAID AS IF SOMETHING AWFUL MIGHT HAPPEN: 0
2. NOT BEING ABLE TO STOP OR CONTROL WORRYING: 0

## 2023-09-21 NOTE — PROGRESS NOTES
LORazepam (ATIVAN) 0.5 MG tablet; Take 1 tablet by mouth 2 times daily as needed for Anxiety for up to 90 days. Max Daily Amount: 1 mg, Disp-60 tablet, R-0Normal  -     hydrOXYzine pamoate (VISTARIL) 50 MG capsule; Take 1 capsule by mouth nightly as needed for Anxiety (sleep), Disp-30 capsule, R-1Normal  2. Unspecified mood (affective) disorder (HCC)  -     ARIPiprazole (ABILIFY) 5 MG tablet; Take 1 tablet by mouth daily, Disp-30 tablet, R-1Normal  3. Sleep difficulties  -     hydrOXYzine pamoate (VISTARIL) 50 MG capsule; Take 1 capsule by mouth nightly as needed for Anxiety (sleep), Disp-30 capsule, R-1Normal       Patient Education and Counseling: Supportive therapy provided for identified psychosocial stressors. Medication education provided and decisions regarding regimen discussed with patient. Plan:  -  Start abilify and vistaril. Continue ativan at reduced ( previously 3-4 times daily)  dose, pt is made aware and voiced understanding that ativan will be for short term use, understanding voiced. -  Crisis plan reviewed and patient verbally contracts for safety. Go to ED with emergent symptoms or safety concerns.  -  Risks, benefits, side effects of medications, including any / all black box warnings, discussed with patient, who verbalizes their understanding.     -  [place] web site checked for controlled substances. Disposition:  home    Follow Up:  Return in ,Return in about 6 weeks (around 11/2/2023). or call in the interim for any side-effects, decompensation, questions, or problems between now and the next visit. 75 minutes face to face with the patient with more than 50% of the total time spent on education, counseling, & coordination of care of the patient regarding ongoing psychiatric illness.       John Poe, [address] [place] [address] Street

## 2023-09-21 NOTE — TELEPHONE ENCOUNTER
In reference to 9/19/23 telephone encounter, pt called back with fax number for [facility]: [phone]. Also, pt wonders if Dr. Jonnie Kirk can fill her Percocet until she is able to get in with [facility]. If so, pt would appreciate this Rx being sent to the [facility].

## 2023-09-21 NOTE — TELEPHONE ENCOUNTER
Patient called back stating she will contact [facility] and get their office fax number and call it back in to Dr Sharita Gomez staff.

## 2023-09-22 NOTE — TELEPHONE ENCOUNTER
Spoke with patient and informed referral has been faxed and that Dr. Terry Kenny can not refill her pain medication since its not cardiac related.

## 2023-10-03 ENCOUNTER — TELEPHONE (OUTPATIENT)
Dept: INTERNAL MEDICINE CLINIC | Facility: CLINIC | Age: 63
End: 2023-10-03

## 2023-10-03 NOTE — TELEPHONE ENCOUNTER
Patient called, has a 10:15 new to provider appointment with Dr Cande Patel, would like to change to a virtual visit. Patient states that she has been having some fever and chills, headache, head/sinus congestion,and diarrhea, since last week. Per Dr Cande Patel, Ms Ivan Garza has to be seen in the office, she has cancelled or was a no show for a few appointments already. If patient doesn't want to come to the office, he advises that she gets seen by urgent care. Patient expressed understanding.

## 2023-10-05 ENCOUNTER — TELEPHONE (OUTPATIENT)
Age: 63
End: 2023-10-05

## 2023-10-05 NOTE — TELEPHONE ENCOUNTER
Pt called and said she needs a referral sent to pain management [facility] in [facility], the fax [phone].  Pt would like a call back

## 2023-10-11 RX ORDER — DULOXETIN HYDROCHLORIDE 60 MG/1
CAPSULE, DELAYED RELEASE ORAL DAILY
Qty: 30 CAPSULE | Refills: 3 | OUTPATIENT
Start: 2023-10-11

## 2023-10-26 ENCOUNTER — TELEPHONE (OUTPATIENT)
Age: 63
End: 2023-10-26

## 2023-10-26 NOTE — TELEPHONE ENCOUNTER
Called pt and informed her of stress test results per Dr. Devon Joiner. Pt voiced understanding and confirmed follow up on 10/30.

## 2023-10-26 NOTE — TELEPHONE ENCOUNTER
----- Message from Damien Diez III, MD sent at 10/25/2023 11:21 AM EDT -----  Please let patient know that there stress test was abnormal. Please schedule for next available appointment to discuss heart cath.

## 2023-10-30 ENCOUNTER — OFFICE VISIT (OUTPATIENT)
Age: 63
End: 2023-10-30
Payer: COMMERCIAL

## 2023-10-30 VITALS
DIASTOLIC BLOOD PRESSURE: 72 MMHG | WEIGHT: 223 LBS | HEART RATE: 96 BPM | BODY MASS INDEX: 37.15 KG/M2 | HEIGHT: 65 IN | SYSTOLIC BLOOD PRESSURE: 132 MMHG

## 2023-10-30 DIAGNOSIS — R94.39 ABNORMAL STRESS TEST: Primary | ICD-10-CM

## 2023-10-30 DIAGNOSIS — I20.0 ACCELERATING ANGINA (HCC): ICD-10-CM

## 2023-10-30 PROCEDURE — 99214 OFFICE O/P EST MOD 30 MIN: CPT | Performed by: INTERNAL MEDICINE

## 2023-10-31 PROBLEM — I20.0 ACCELERATING ANGINA (HCC): Status: ACTIVE | Noted: 2023-10-30

## 2023-11-02 ENCOUNTER — OFFICE VISIT (OUTPATIENT)
Dept: BEHAVIORAL/MENTAL HEALTH CLINIC | Age: 63
End: 2023-11-02
Payer: COMMERCIAL

## 2023-11-02 VITALS
OXYGEN SATURATION: 96 % | SYSTOLIC BLOOD PRESSURE: 116 MMHG | TEMPERATURE: 97.7 F | BODY MASS INDEX: 37.61 KG/M2 | WEIGHT: 226 LBS | HEART RATE: 96 BPM | DIASTOLIC BLOOD PRESSURE: 64 MMHG

## 2023-11-02 DIAGNOSIS — F41.1 GENERALIZED ANXIETY DISORDER: ICD-10-CM

## 2023-11-02 DIAGNOSIS — F33.0 MILD EPISODE OF RECURRENT MAJOR DEPRESSIVE DISORDER (HCC): Primary | ICD-10-CM

## 2023-11-02 DIAGNOSIS — G47.9 SLEEP DIFFICULTIES: ICD-10-CM

## 2023-11-02 PROCEDURE — 99213 OFFICE O/P EST LOW 20 MIN: CPT

## 2023-11-02 RX ORDER — DULOXETIN HYDROCHLORIDE 60 MG/1
60 CAPSULE, DELAYED RELEASE ORAL 2 TIMES DAILY
Qty: 180 CAPSULE | Refills: 0 | Status: SHIPPED | OUTPATIENT
Start: 2023-11-02

## 2023-11-02 RX ORDER — DULOXETIN HYDROCHLORIDE 60 MG/1
60 CAPSULE, DELAYED RELEASE ORAL DAILY
COMMUNITY
End: 2023-11-02 | Stop reason: SDUPTHER

## 2023-11-02 RX ORDER — LORAZEPAM 0.5 MG/1
0.5 TABLET ORAL 2 TIMES DAILY PRN
Qty: 60 TABLET | Refills: 1 | Status: SHIPPED | OUTPATIENT
Start: 2023-11-02 | End: 2024-01-31

## 2023-11-02 RX ORDER — HYDROXYZINE PAMOATE 50 MG/1
50 CAPSULE ORAL
Qty: 180 CAPSULE | Refills: 0 | Status: SHIPPED | OUTPATIENT
Start: 2023-11-02

## 2023-11-02 NOTE — PROGRESS NOTES
OUTPATIENT PSYCHIATRIC RETURN VISIT PROGRESS NOTE    Date of Service: 11/2/2023     Identification    Anil Ricks is a 59 y.o.  with a past psychiatric history of BRYCE/MDD who presents today for a psychiatric follow up appointment. CC:  Routine medication management follow up. Chief Complaint   Patient presents with    Follow-up        Subjective / Interval History:    Pt comes to clinic today and reports that she is doing \"good and great\". Work is going well and socializing with others. Endorses a stable mood most days of the week, occasional anxiety \"only when I'm worried about something, but I can handle it\". Ativan at least once daily 4-5 days, \"the other days two times from feeling anxious about family stuff\". Has a small family gather planned for thanksgiving. States that she is satisfied with current medication regime. Pt has been medication compliant and denies any side-effects. Pt denies SI, HI and AVH. Does not endorse any manic or psychotic symptoms. Psychiatric Review Of Systems:  Sleep: hydroxyzine is helpful, averages 6-7 hrs of sleep \"I'm fine with it\" . Denies nightmares or daytime drowsiness. Appetite: ok  Current suicidal/homicidal ideations: Denies SI/ HI  Current auditory/visual hallucinations: Denies     Medications:    Current Outpatient Medications:     DULoxetine (CYMBALTA) 60 MG extended release capsule, Take 1 capsule by mouth 2 times daily, Disp: 180 capsule, Rfl: 0    LORazepam (ATIVAN) 0.5 MG tablet, Take 1 tablet by mouth 2 times daily as needed for Anxiety for up to 90 days.  Max Daily Amount: 1 mg, Disp: 60 tablet, Rfl: 1    hydrOXYzine pamoate (VISTARIL) 50 MG capsule, Take 1 capsule by mouth nightly as needed for Anxiety (sleep), Disp: 180 capsule, Rfl: 0    meclizine (ANTIVERT) 25 MG tablet, Take 1 tablet by mouth 3 times daily as needed, Disp: , Rfl:     oxyCODONE-acetaminophen (PERCOCET) 7.5-325 MG per tablet, TAKE 1 TABLET BY MOUTH EVERY 8 (EIGHT) HOURS AS NEEDED

## 2023-11-03 NOTE — PROGRESS NOTES
Patient pre-assessment complete for [facility]ie scheduled for [facility], arrival time 0900. Patient verified using . Patient instructed to bring a list of all home medications on the day of procedure. NPO status reinforced. Patient informed to take a full dose aspirin 325mg  or 81 mg x 4 on the day of procedure. Patient instructed to take plavix the morning of the procedure. Instructed they can take all other medications excluding vitamins & supplements. Patient verbalizes understanding of all instructions & denies any questions at this time.

## 2023-11-06 ENCOUNTER — HOSPITAL ENCOUNTER (OUTPATIENT)
Age: 63
Setting detail: OUTPATIENT SURGERY
Discharge: HOME | End: 2023-11-06
Attending: INTERNAL MEDICINE | Admitting: INTERNAL MEDICINE
Payer: COMMERCIAL

## 2023-11-06 VITALS
BODY MASS INDEX: 37.65 KG/M2 | SYSTOLIC BLOOD PRESSURE: 100 MMHG | HEIGHT: 65 IN | RESPIRATION RATE: 25 BRPM | WEIGHT: 226 LBS | DIASTOLIC BLOOD PRESSURE: 68 MMHG | OXYGEN SATURATION: 99 % | HEART RATE: 103 BPM

## 2023-11-06 DIAGNOSIS — I20.0 ACCELERATING ANGINA (HCC): ICD-10-CM

## 2023-11-06 LAB
ANION GAP SERPL CALC-SCNC: 8 MMOL/L (ref 2–11)
BUN SERPL-MCNC: 11 MG/DL (ref 8–23)
CALCIUM SERPL-MCNC: 9.3 MG/DL (ref 8.3–10.4)
CHLORIDE SERPL-SCNC: 103 MMOL/L (ref 101–110)
CO2 SERPL-SCNC: 25 MMOL/L (ref 21–32)
CREAT SERPL-MCNC: 0.9 MG/DL (ref 0.6–1)
ECHO BSA: 2.17 M2
EKG ATRIAL RATE: 114 BPM
EKG DIAGNOSIS: NORMAL
EKG P AXIS: 39 DEGREES
EKG P-R INTERVAL: 164 MS
EKG Q-T INTERVAL: 338 MS
EKG QRS DURATION: 74 MS
EKG QTC CALCULATION (BAZETT): 465 MS
EKG R AXIS: -4 DEGREES
EKG T AXIS: 7 DEGREES
EKG VENTRICULAR RATE: 114 BPM
ERYTHROCYTE [DISTWIDTH] IN BLOOD BY AUTOMATED COUNT: 16.2 % (ref 11.9–14.6)
GLUCOSE SERPL-MCNC: 166 MG/DL (ref 65–100)
HCT VFR BLD AUTO: 37.7 % (ref 35.8–46.3)
HGB BLD-MCNC: 12 G/DL (ref 11.7–15.4)
MCH RBC QN AUTO: 24.6 PG (ref 26.1–32.9)
MCHC RBC AUTO-ENTMCNC: 31.8 G/DL (ref 31.4–35)
MCV RBC AUTO: 77.4 FL (ref 82–102)
NRBC # BLD: 0 K/UL (ref 0–0.2)
PLATELET # BLD AUTO: 511 K/UL (ref 150–450)
PMV BLD AUTO: 8.2 FL (ref 9.4–12.3)
POTASSIUM SERPL-SCNC: 3.9 MMOL/L (ref 3.5–5.1)
RBC # BLD AUTO: 4.87 M/UL (ref 4.05–5.2)
SODIUM SERPL-SCNC: 136 MMOL/L (ref 133–143)
WBC # BLD AUTO: 9.3 K/UL (ref 4.3–11.1)

## 2023-11-06 PROCEDURE — 6370000000 HC RX 637 (ALT 250 FOR IP): Performed by: INTERNAL MEDICINE

## 2023-11-06 PROCEDURE — 93571 IV DOP VEL&/PRESS C FLO 1ST: CPT | Performed by: INTERNAL MEDICINE

## 2023-11-06 PROCEDURE — 2709999900 HC NON-CHARGEABLE SUPPLY: Performed by: INTERNAL MEDICINE

## 2023-11-06 PROCEDURE — 85027 COMPLETE CBC AUTOMATED: CPT

## 2023-11-06 PROCEDURE — 2500000003 HC RX 250 WO HCPCS: Performed by: INTERNAL MEDICINE

## 2023-11-06 PROCEDURE — 6360000004 HC RX CONTRAST MEDICATION: Performed by: INTERNAL MEDICINE

## 2023-11-06 PROCEDURE — 80048 BASIC METABOLIC PNL TOTAL CA: CPT

## 2023-11-06 PROCEDURE — C1769 GUIDE WIRE: HCPCS | Performed by: INTERNAL MEDICINE

## 2023-11-06 PROCEDURE — 2580000003 HC RX 258: Performed by: INTERNAL MEDICINE

## 2023-11-06 PROCEDURE — 6360000002 HC RX W HCPCS: Performed by: INTERNAL MEDICINE

## 2023-11-06 PROCEDURE — 93458 L HRT ARTERY/VENTRICLE ANGIO: CPT | Performed by: INTERNAL MEDICINE

## 2023-11-06 PROCEDURE — 99152 MOD SED SAME PHYS/QHP 5/>YRS: CPT | Performed by: INTERNAL MEDICINE

## 2023-11-06 PROCEDURE — C1894 INTRO/SHEATH, NON-LASER: HCPCS | Performed by: INTERNAL MEDICINE

## 2023-11-06 PROCEDURE — C1887 CATHETER, GUIDING: HCPCS | Performed by: INTERNAL MEDICINE

## 2023-11-06 RX ORDER — SODIUM CHLORIDE 9 MG/ML
INJECTION, SOLUTION INTRAVENOUS CONTINUOUS
Status: DISCONTINUED | OUTPATIENT
Start: 2023-11-06 | End: 2023-11-06 | Stop reason: HOSPADM

## 2023-11-06 RX ORDER — BIVALIRUDIN 250 MG/5ML
INJECTION, POWDER, LYOPHILIZED, FOR SOLUTION INTRAVENOUS PRN
Status: DISCONTINUED | OUTPATIENT
Start: 2023-11-06 | End: 2023-11-06 | Stop reason: HOSPADM

## 2023-11-06 RX ORDER — METOPROLOL TARTRATE 5 MG/5ML
INJECTION INTRAVENOUS PRN
Status: DISCONTINUED | OUTPATIENT
Start: 2023-11-06 | End: 2023-11-06 | Stop reason: HOSPADM

## 2023-11-06 RX ORDER — MIDAZOLAM HYDROCHLORIDE 1 MG/ML
INJECTION INTRAMUSCULAR; INTRAVENOUS PRN
Status: DISCONTINUED | OUTPATIENT
Start: 2023-11-06 | End: 2023-11-06 | Stop reason: HOSPADM

## 2023-11-06 RX ORDER — LIDOCAINE HYDROCHLORIDE 10 MG/ML
INJECTION, SOLUTION INFILTRATION; PERINEURAL PRN
Status: DISCONTINUED | OUTPATIENT
Start: 2023-11-06 | End: 2023-11-06 | Stop reason: HOSPADM

## 2023-11-06 RX ORDER — SODIUM CHLORIDE 0.9 % (FLUSH) 0.9 %
5-40 SYRINGE (ML) INJECTION EVERY 12 HOURS SCHEDULED
Status: DISCONTINUED | OUTPATIENT
Start: 2023-11-06 | End: 2023-11-06 | Stop reason: HOSPADM

## 2023-11-06 RX ORDER — ACETAMINOPHEN 325 MG/1
650 TABLET ORAL EVERY 4 HOURS PRN
Status: DISCONTINUED | OUTPATIENT
Start: 2023-11-06 | End: 2023-11-06 | Stop reason: HOSPADM

## 2023-11-06 RX ORDER — SODIUM CHLORIDE 0.9 % (FLUSH) 0.9 %
5-40 SYRINGE (ML) INJECTION PRN
Status: DISCONTINUED | OUTPATIENT
Start: 2023-11-06 | End: 2023-11-06 | Stop reason: HOSPADM

## 2023-11-06 RX ORDER — HEPARIN SODIUM 200 [USP'U]/100ML
INJECTION, SOLUTION INTRAVENOUS CONTINUOUS PRN
Status: COMPLETED | OUTPATIENT
Start: 2023-11-06 | End: 2023-11-06

## 2023-11-06 RX ORDER — ASPIRIN 81 MG/1
324 TABLET, CHEWABLE ORAL DAILY
Status: DISCONTINUED | OUTPATIENT
Start: 2023-11-06 | End: 2023-11-06 | Stop reason: HOSPADM

## 2023-11-06 RX ORDER — SODIUM CHLORIDE 9 MG/ML
INJECTION, SOLUTION INTRAVENOUS PRN
Status: DISCONTINUED | OUTPATIENT
Start: 2023-11-06 | End: 2023-11-06 | Stop reason: HOSPADM

## 2023-11-06 RX ADMIN — SODIUM CHLORIDE: 9 INJECTION, SOLUTION INTRAVENOUS at 09:34

## 2023-11-06 RX ADMIN — ACETAMINOPHEN 650 MG: 325 TABLET ORAL at 14:26

## 2023-11-06 RX ADMIN — ASPIRIN 324 MG: 81 TABLET, CHEWABLE ORAL at 09:18

## 2023-11-06 ASSESSMENT — ENCOUNTER SYMPTOMS
SHORTNESS OF BREATH: 0
ORTHOPNEA: 0
ABDOMINAL PAIN: 0

## 2023-11-06 NOTE — PROGRESS NOTES
with the patient today. DATA REVIEW    LIPID PANEL     Lab Results   Component Value Date    CHOL 109 07/19/2023     Lab Results   Component Value Date    TRIG 100 07/19/2023     Lab Results   Component Value Date    HDL 46 07/19/2023     Lab Results   Component Value Date    LDLCALC 43 07/19/2023     Lab Results   Component Value Date    LABVLDL 20 07/19/2023     Lab Results   Component Value Date    CHOLHDLRATIO 2.4 07/19/2023       BMP  Lab Results   Component Value Date/Time     11/06/2023 09:05 AM    K 3.9 11/06/2023 09:05 AM     11/06/2023 09:05 AM    CO2 25 11/06/2023 09:05 AM    BUN 11 11/06/2023 09:05 AM    CREATININE 0.90 11/06/2023 09:05 AM    GLUCOSE 166 11/06/2023 09:05 AM    CALCIUM 9.3 11/06/2023 09:05 AM          EKG        CXR/IMAGING        DEVICE INTERROGATION        OUTSIDE RECORDS REVIEW    Records from outside providers have been reviewed and summarized as noted in the HPI, past history and data review sections of this note, and reviewed with patient. .       ASSESSMENT and PLAN    Chasity was seen today for hyperlipidemia. Diagnoses and all orders for this visit:    Abnormal stress test  -     Case Request Cardiac Cath Lab    [facility])          IMPRESSION:    Ms. Renu Ennis presents today for follow-up. We reviewed her nuclear stress test which showed anterolateral ischemia. Given her symptoms and stress test findings, have recommended a coronary catheterization to evaluate for obstructive coronary disease. We discussed this procedure in detail and she is amenable to proceed. We will schedule this at her convenience. No follow-ups on file. Thank you for allowing me to participate in this patient's care. Please call or contact me if there are any questions or concerns regarding the above.       Sari Cox III, MD  11/06/23  10:51 AM

## 2023-11-06 NOTE — PROGRESS NOTES
TRANSFER - IN REPORT:    Verbal report received from [facility] on [address]  being received from cath lab for routine progression of patient care      Report consisted of patient's Situation, Background, Assessment and   Recommendations(SBAR). Information from the following report(s) Nurse Handoff Report was reviewed with the receiving nurse. Opportunity for questions and clarification was provided. Assessment completed upon patient's arrival to unit and care assumed.

## 2023-11-06 NOTE — DISCHARGE INSTRUCTIONS
HEART CATHETERIZATION/ANGIOGRAPHY DISCHARGE INSTRUCTIONS    Check puncture site frequently for swelling or bleeding. If there is any bleeding, apply pressure over the area with a clean towel or washcloth and call 911. Notify your doctor for any redness, swelling, drainage, or oozing from the puncture site. Notify your doctor for any fever or chills. If the extremity becomes cold, numb, or painful call [facility] at [phone]. Activity should be limited for the next 48 hours. No heavy lifting, pushing, pulling  or strenuous activity for 48 hours. No heavy lifting (anything over 10 pounds) for 3 days. You may resume your usual diet. Drink more fluids than usual.  Have a responsible person drive you home and stay with you for at least 24 hours after your heart catheterization/angiography. You may remove bandage from your right wrist in 24 hours. You may shower in 24 hours. No tub baths, hot tubs, or swimming for 1 week. Do not place any lotions, creams, powders, or ointments over puncture site for 1 week. You may place a clean band-aid over the puncture site each day for 5 days. Change daily. Sedation for a Medical Procedure: Care Instructions     You were given a sedative medication during your visit. While many of the effects will have worn   off before you leave; you may continue to feel some effects for several hours. Common side effects from sedation include:  Feeling sleepy. (Your doctors and nurses will make sure you are not too sleepy to go home.)  Nausea and vomiting. This usually does not last long. Feeling tired. How can you care for yourself at home? Activity    Don't do anything for 24 hours that requires attention to detail. It takes time for the medicine effects to completely wear off. Do not make important legal decisions for 24 hours. Do not sign any legal documents for 24 hours.      Do not drink alcohol today     For your safety, you should not drive or operate

## 2023-11-06 NOTE — PROGRESS NOTES
[facility] RR with Dr Miguel Naidu. Pressure wire to RCA neg. Heparin 5000u  Angiomax 76.9mg bolus/gtt @ 37 stopped in lab  Versed 2mg  Lopressor 5mg  Fentanyl 100mcg  TR @ 12    Report to receiving RN. Pt transferred to [address] [place] [address].

## 2023-11-07 ENCOUNTER — TELEPHONE (OUTPATIENT)
Age: 63
End: 2023-11-07

## 2023-11-07 NOTE — TELEPHONE ENCOUNTER
Pt is calling saying she had a cath yesterday with Dr Salvatore Lane and her arm is swollen and aching . She is wanting  to see if he will call something in for her pain and for her swelling . Please call pt back asap  and let her know something ,she is very uncomfortable.

## 2023-11-07 NOTE — TELEPHONE ENCOUNTER
Maribel Gunn MD  You 3 minutes ago (3:24 PM)       Have her come in for a nurse visit tomorrow to evaluate. Thanks   wgw    Pt.notified and appt. made tomorrow 3:30pm

## 2023-11-07 NOTE — TELEPHONE ENCOUNTER
She had heart cath yesterday. Has an  appt. next week for fu. The arm is swelling really bad and throbbing. She was asking if she can have a prescription for Prednisone and pain med? I told her normally we order further evaluation w/an ultrasound in these cases v's just prescribing med. I told her I will talk w/the doctor and see what he advises and call her back.

## 2023-11-08 ENCOUNTER — NURSE ONLY (OUTPATIENT)
Age: 63
End: 2023-11-08

## 2023-11-08 NOTE — PROGRESS NOTES
Right radial site without redness, drainage, or swelling. Pt states she no longer has pain in area. States she elevated her arm last night  & applied ice to the site. Swelling went down & pain subsided. Dr. Joslyn Bryant assessed her Right radial site. He said everything looked good. Pt instructed to let us know if pain or swelling returns. Pt agreed.

## 2023-11-12 PROBLEM — F33.0 MILD EPISODE OF RECURRENT MAJOR DEPRESSIVE DISORDER (HCC): Status: ACTIVE | Noted: 2023-11-12

## 2023-11-14 ENCOUNTER — OFFICE VISIT (OUTPATIENT)
Age: 63
End: 2023-11-14
Payer: COMMERCIAL

## 2023-11-14 VITALS
SYSTOLIC BLOOD PRESSURE: 120 MMHG | BODY MASS INDEX: 36.77 KG/M2 | HEART RATE: 84 BPM | HEIGHT: 66 IN | WEIGHT: 228.8 LBS | DIASTOLIC BLOOD PRESSURE: 76 MMHG

## 2023-11-14 DIAGNOSIS — R06.09 DOE (DYSPNEA ON EXERTION): Primary | ICD-10-CM

## 2023-11-14 DIAGNOSIS — R94.39 ABNORMAL STRESS TEST: ICD-10-CM

## 2023-11-14 PROCEDURE — 99214 OFFICE O/P EST MOD 30 MIN: CPT | Performed by: INTERNAL MEDICINE

## 2023-11-14 NOTE — PROGRESS NOTES
Value Date    LABVLDL 20 07/19/2023     Lab Results   Component Value Date    CHOLHDLRATIO 2.4 07/19/2023       BMP  Lab Results   Component Value Date/Time     11/06/2023 09:05 AM    K 3.9 11/06/2023 09:05 AM     11/06/2023 09:05 AM    CO2 25 11/06/2023 09:05 AM    BUN 11 11/06/2023 09:05 AM    CREATININE 0.90 11/06/2023 09:05 AM    GLUCOSE 166 11/06/2023 09:05 AM    CALCIUM 9.3 11/06/2023 09:05 AM              OUTSIDE RECORDS REVIEW    Records from outside providers have been reviewed and summarized as noted in the HPI, past history and data review sections of this note, and reviewed with patient. .       ASSESSMENT and PLAN    Chasity was seen today for other. Diagnoses and all orders for this visit:    SONG (dyspnea on exertion)    Abnormal stress test          IMPRESSION:    Ms. Vineet Stout presents today for follow-up. She stable from a cardiac standpoint today. We reviewed her heart catheterization which showed mild, nonobstructive disease including a negative FFR of the right coronary. I reassured her that her symptoms are more likely related to her weight and deconditioning. We will continue with her current medical therapy including her atorvastatin and Plavix. We will see her back on an as-needed basis. No follow-ups on file. Thank you for allowing me to participate in this patient's care. Please call or contact me if there are any questions or concerns regarding the above.       Daniel Feldman III, MD  11/14/23  3:17 PM

## 2023-11-19 DIAGNOSIS — F39 UNSPECIFIED MOOD (AFFECTIVE) DISORDER (HCC): ICD-10-CM

## 2023-11-20 RX ORDER — ARIPIPRAZOLE 5 MG/1
5 TABLET ORAL DAILY
Qty: 30 TABLET | Refills: 1 | OUTPATIENT
Start: 2023-11-20

## 2023-12-18 PROBLEM — G89.29 CHRONIC BILATERAL LOW BACK PAIN WITH BILATERAL SCIATICA: Status: ACTIVE | Noted: 2023-12-18

## 2023-12-18 PROBLEM — M43.16 SPONDYLOLISTHESIS OF LUMBAR REGION: Status: ACTIVE | Noted: 2021-03-23

## 2023-12-18 PROBLEM — M54.41 CHRONIC BILATERAL LOW BACK PAIN WITH BILATERAL SCIATICA: Status: ACTIVE | Noted: 2023-12-18

## 2023-12-18 PROBLEM — M54.42 CHRONIC BILATERAL LOW BACK PAIN WITH BILATERAL SCIATICA: Status: ACTIVE | Noted: 2023-12-18

## 2023-12-18 PROBLEM — M54.2 CERVICALGIA: Status: ACTIVE | Noted: 2023-12-18

## 2023-12-18 PROBLEM — G89.4 CHRONIC PAIN DISORDER: Status: ACTIVE | Noted: 2018-12-15

## 2023-12-18 PROBLEM — Z71.9 HEALTH EDUCATION/COUNSELING: Status: ACTIVE | Noted: 2023-12-18

## 2024-01-13 ENCOUNTER — APPOINTMENT (OUTPATIENT)
Dept: CT IMAGING | Age: 64
End: 2024-01-13
Payer: COMMERCIAL

## 2024-01-13 ENCOUNTER — HOSPITAL ENCOUNTER (EMERGENCY)
Age: 64
Discharge: HOME | End: 2024-01-13
Payer: COMMERCIAL

## 2024-01-13 VITALS
RESPIRATION RATE: 18 BRPM | TEMPERATURE: 97.2 F | OXYGEN SATURATION: 94 % | DIASTOLIC BLOOD PRESSURE: 87 MMHG | HEART RATE: 108 BPM | SYSTOLIC BLOOD PRESSURE: 145 MMHG | WEIGHT: 200 LBS | HEIGHT: 66 IN | BODY MASS INDEX: 32.14 KG/M2

## 2024-01-13 DIAGNOSIS — J01.00 ACUTE NON-RECURRENT MAXILLARY SINUSITIS: ICD-10-CM

## 2024-01-13 DIAGNOSIS — S09.90XA INJURY OF HEAD, INITIAL ENCOUNTER: ICD-10-CM

## 2024-01-13 DIAGNOSIS — Z76.0 ENCOUNTER FOR MEDICATION REFILL: ICD-10-CM
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
## 2024-01-13 DIAGNOSIS — M54.9 ACUTE BILATERAL BACK PAIN, UNSPECIFIED BACK LOCATION: ICD-10-CM

## 2024-01-13 PROCEDURE — 6370000000 HC RX 637 (ALT 250 FOR IP): Performed by: NURSE PRACTITIONER

## 2024-01-13 PROCEDURE — 6360000002 HC RX W HCPCS: Performed by: NURSE PRACTITIONER

## 2024-01-13 PROCEDURE — 72131 CT LUMBAR SPINE W/O DYE: CPT

## 2024-01-13 PROCEDURE — 70450 CT HEAD/BRAIN W/O DYE: CPT

## 2024-01-13 PROCEDURE — 96372 THER/PROPH/DIAG INJ SC/IM: CPT

## 2024-01-13 PROCEDURE — 99284 EMERGENCY DEPT VISIT MOD MDM: CPT

## 2024-01-13 PROCEDURE — 72125 CT NECK SPINE W/O DYE: CPT

## 2024-01-13 RX ORDER — MECLIZINE HYDROCHLORIDE 25 MG/1
25 TABLET ORAL 3 TIMES DAILY PRN
Qty: 30 TABLET | Refills: 1 | Status: SHIPPED | OUTPATIENT
Start: 2024-01-13 | End: 2024-01-20

## 2024-01-13 RX ORDER — BUTALBITAL, ACETAMINOPHEN AND CAFFEINE 50; 325; 40 MG/1; MG/1; MG/1
1 TABLET ORAL EVERY 4 HOURS PRN
Qty: 20 TABLET | Refills: 0 | Status: SHIPPED | OUTPATIENT
Start: 2024-01-13 | End: 2024-01-20

## 2024-01-13 RX ORDER — CYCLOBENZAPRINE HCL 10 MG
10 TABLET ORAL 3 TIMES DAILY PRN
Qty: 30 TABLET | Refills: 0 | Status: SHIPPED | OUTPATIENT
Start: 2024-01-13

## 2024-01-13 RX ORDER — DOXYCYCLINE HYCLATE 100 MG
100 TABLET ORAL 2 TIMES DAILY
Qty: 14 TABLET | Refills: 0 | Status: SHIPPED | OUTPATIENT
Start: 2024-01-13 | End: 2024-01-20

## 2024-01-13 RX ORDER — OXYCODONE HYDROCHLORIDE 5 MG/1
5 TABLET ORAL
Status: COMPLETED | OUTPATIENT
Start: 2024-01-13 | End: 2024-01-13

## 2024-01-13 RX ORDER — DEXAMETHASONE SODIUM PHOSPHATE 10 MG/ML
8 INJECTION INTRAMUSCULAR; INTRAVENOUS ONCE
Status: COMPLETED | OUTPATIENT
Start: 2024-01-13 | End: 2024-01-13

## 2024-01-13 RX ORDER — METHOCARBAMOL 500 MG/1
1000 TABLET, FILM COATED ORAL
Status: COMPLETED | OUTPATIENT
Start: 2024-01-13 | End: 2024-01-13

## 2024-01-13 RX ADMIN — DEXAMETHASONE SODIUM PHOSPHATE 8 MG: 10 INJECTION INTRAMUSCULAR; INTRAVENOUS at 15:43

## 2024-01-13 RX ADMIN — OXYCODONE 5 MG: 5 TABLET ORAL at 15:43

## 2024-01-13 RX ADMIN — METHOCARBAMOL 1000 MG: 500 TABLET ORAL at 15:43

## 2024-01-13 ASSESSMENT — PAIN SCALES - GENERAL
PAINLEVEL_OUTOF10: 10
PAINLEVEL_OUTOF10: 10

## 2024-01-13 ASSESSMENT — PAIN DESCRIPTION - LOCATION: LOCATION: GENERALIZED

## 2024-01-13 ASSESSMENT — PAIN - FUNCTIONAL ASSESSMENT: PAIN_FUNCTIONAL_ASSESSMENT: 0-10

## 2024-01-13 NOTE — ED PROVIDER NOTES
Past Medical History:   Diagnosis Date    Anxiety     anxiety    Arthritis     Bell's palsy     Bipolar disorder (HCC)     Cervical disc disorder     Chronic low back pain     ,neck,hip pain.    Chronic pain     CTS (carpal tunnel syndrome)     Depression     Diverticulitis     GERD (gastroesophageal reflux disease)     Head injury     Headache     Low back pain     Lumbosacral disc disease     Memory disorder     Neck pain     Primary insomnia     PTSD (post-traumatic stress disorder)     Sleep difficulties     Thoracic disc disorder     Vitamin D deficiency         Past Surgical History:   Procedure Laterality Date    APPENDECTOMY      CARDIAC PROCEDURE N/A 2023    Left heart cath / coronary angiography performed by Yuniel Waite MD at [facility] CARDIAC CATH LAB    CARDIAC PROCEDURE N/A 2023    Fractional flow reserve (FFR) performed by Yuniel Waite MD at [facility] CARDIAC CATH LAB    CARPAL TUNNEL RELEASE      CERVICAL DISCECTOMY      CERVICAL FUSION       SECTION   &     Children    CHOLECYSTECTOMY      HYSTERECTOMY (CERVIX STATUS UNKNOWN)      NEUROLOGICAL SURGERY      cervical fusion    ORTHOPEDIC SURGERY      neck x2    TUBAL LIGATION          Results for orders placed or performed during the hospital encounter of 24   CT HEAD WO CONTRAST    Narrative    EXAM: CT HEAD WO CONTRAST    HISTORY: 63 years Female with Reason for exam:->fall, head injury    TECHNIQUE: Noncontrast contiguous axial slices were obtained from the  craniovertebral junction through the vertex using brain and bone algorithms.  Sagittal and coronal reformatted images are generated.  All CT scans at this location are performed using dose optimization techniques  including the following: Automated exposure control; adjustment of the mA and/or  kV; and/or use of iterative reconstruction technique. Total exam DLP is 1628.93  mGycm.    COMPARISON: None.    FINDINGS:    There is no intracranial  WO CONTRAST    Reason for study: Reason for exam:->fall back pain    TECHNIQUE: Thoracic and Lumbar Spine CT without contrast. Sagittal and coronal  reformatted images are generated.  All CT scans at this location are performed using dose optimization techniques  including the following: Automated exposure control; adjustment of the mA and/or  kV; and/or use of iterative reconstruction technique.  Total exam DLP is 1628.93 mGycm    COMPARISON: None.    FINDINGS:    There is no fracture, traumatic subluxation or other acute osseous abnormality  of the thoracic or lumbar spine. There are degenerative disc changes and  spondylosis of the thoracic and lumbar spine with multilevel foraminal stenosis.  There is grade 1 degenerative anterolisthesis of L4 and L5. There is  degenerative change at the SI joints. There is scarring within the lung apices.  There is dependent subsegmental atelectasis within the lungs. Interpolar left  renal cyst is noted.        Impression    No acute osseous abnormality of the thoracic or lumbar spine. Degenerative  change.          CT HEAD WO CONTRAST   Final Result      No intracranial hemorrhage, mass effect or midline shift. No CT evidence of   acute large vascular territorial infarct.               CT CERVICAL SPINE WO CONTRAST   Final Result      No acute posttraumatic bony abnormality of the cervical spine. Postoperative and   degenerative change. Straightening of the cervical spine may indicate muscle   spasm.         CT SPINE THORAC LUMB WO CONTRAST   Final Result      No acute osseous abnormality of the thoracic or lumbar spine. Degenerative   change.               Voice dictation software was used during the making of this note.  This software is not perfect and grammatical and other typographical errors may be present.  This note has not been completely proofread for errors.        Silvia Farrell, CRYSTAL - CNP  01/13/24 [id number]

## 2024-01-13 NOTE — DISCHARGE INSTRUCTIONS
Your CT scans are unremarkable today.  There are no acute fractures or injury to your spine. Your pain is likely due to muscle strain from the fall. Perform frequent, gentle stretching of your neck and back.  Apply heat for 10 to 15 minutes 3-4 times a day to help alleviate pain.  Massage with over-the-counter muscle rub can also help alleviate pain.  Take medication as prescribed.  Follow-up with your primary care provider for recheck.  Return to the emergency department for any new, worsening, or concerning symptoms.

## 2024-01-13 NOTE — ED NOTES
I have reviewed discharge instructions with the patient.  The patient verbalized understanding.    Patient left ED via Discharge Method: ambulatory to Home with self.    Opportunity for questions and clarification provided.       Patient given 4 scripts.         To continue your aftercare when you leave the hospital, you may receive an automated call from our care team to check in on how you are doing.  This is a free service and part of our promise to provide the best care and service to meet your aftercare needs.” If you have questions, or wish to unsubscribe from this service please call [phone].  Thank you for Choosing our [facility] Emergency Department.

## 2024-01-13 NOTE — ED TRIAGE NOTES
Pt reports lost balance and fell striking head a few days ago. Reports had brief LOC. Pt reports history of neck surgery. Pt reports continues with worse neck pain, back pain, bilateral hip pain. And headache. Pt seen by Urgent care and referred to ER.Pt states also wants  to be seen for sinusitis x 1 month. Also requesting refill of antivert for ongoing vertigo

## 2024-01-17 ENCOUNTER — OFFICE VISIT (OUTPATIENT)
Dept: NEUROSURGERY | Age: 64
End: 2024-01-17
Payer: COMMERCIAL

## 2024-01-17 VITALS
SYSTOLIC BLOOD PRESSURE: 126 MMHG | WEIGHT: 222 LBS | HEART RATE: 100 BPM | HEIGHT: 66 IN | BODY MASS INDEX: 35.68 KG/M2 | DIASTOLIC BLOOD PRESSURE: 85 MMHG | TEMPERATURE: 100 F | OXYGEN SATURATION: 94 %

## 2024-01-17 DIAGNOSIS — M48.02 CERVICAL SPINAL STENOSIS: ICD-10-CM

## 2024-01-17 DIAGNOSIS — G95.9 CERVICAL MYELOPATHY (HCC): Primary | ICD-10-CM

## 2024-01-17 DIAGNOSIS — M43.16 SPONDYLOLISTHESIS AT L4-L5 LEVEL: ICD-10-CM

## 2024-01-17 DIAGNOSIS — G89.4 CHRONIC PAIN SYNDROME: ICD-10-CM

## 2024-01-17 PROCEDURE — 99214 OFFICE O/P EST MOD 30 MIN: CPT | Performed by: NEUROLOGICAL SURGERY

## 2024-01-17 NOTE — PROGRESS NOTES
[facility] NOTE:   History of Present Illness:    CC: Evaluation of neck pain, bilateral upper extremity weakness and low back pain discomfort.    Chasity Carpenter is a 63 y.o. female with a past medical history of PTSD, stroke, chronic pain syndrome, history of anterior cervical discectomy and fusion performed by Dr. Davenport. The patient has x-rays AP and lateral of the cervical spine that demonstrates a prior C4-C5 anterior cervical discectomy and fusion with proper positioning of the interbody spacer solid fusion across the disc space and proper positioning of the C4-C5 anterior cervical plate screw fixation.  The patient has a CT cervical spine without contrast demonstrating no evidence of acute fracture.  There is an anterior cervical discectomy and fusion at C4-C5 there is also bony fusion spanning from C5-C7 without anterior plate fixation.  The patient has an MRI cervical spine performed at [facility] on 12/22/2022.  This MRI demonstrates severe spinal stenosis at C3-C4 with cord compression and moderate spinal stenosis at C4-C5 there is also evidence of a prior anterior cervical discectomy and fusion construct spanning C4-C5 and bony fusion from C5-C7 without anterior plate fixation.  She endorses significant neck pain and discomfort and has had weakness in her bilateral upper extremities dating back to 2007 for many years she states.  Endorses significant pain discussion comfort.  She used to take Percocet 7.5 which was prescribed by her prior pain management doctor but her primary care provider will not prescribe pain medications further.  She recently saw my NP on 12/20/2023 referred her to me to discuss her cervical and lumbar issues.  The patient has an MRI lumbar spine that demonstrates a grade 1 spondylolisthesis of L4 in relation to S1 and at that level there is central and lateral recess spinal canal stenosis.  She endorses significant pain

## 2024-01-20 ENCOUNTER — HOSPITAL ENCOUNTER (EMERGENCY)
Age: 64
Discharge: HOME | End: 2024-01-20
Attending: EMERGENCY MEDICINE
Payer: COMMERCIAL

## 2024-01-20 ENCOUNTER — APPOINTMENT (OUTPATIENT)
Dept: GENERAL RADIOLOGY | Age: 64
End: 2024-01-20
Payer: COMMERCIAL

## 2024-01-20 VITALS
OXYGEN SATURATION: 97 % | HEART RATE: 91 BPM | DIASTOLIC BLOOD PRESSURE: 72 MMHG | TEMPERATURE: 98.8 F | SYSTOLIC BLOOD PRESSURE: 128 MMHG | RESPIRATION RATE: 16 BRPM

## 2024-01-20 DIAGNOSIS — Z87.898 HISTORY OF VERTIGO: ICD-10-CM

## 2024-01-20 DIAGNOSIS — G89.4 CHRONIC PAIN SYNDROME: ICD-10-CM

## 2024-01-20 DIAGNOSIS — R07.89 ATYPICAL CHEST PAIN: Primary | ICD-10-CM

## 2024-01-20 LAB
ALBUMIN SERPL-MCNC: 3.9 G/DL (ref 3.2–4.6)
ALBUMIN/GLOB SERPL: 1 (ref 0.4–1.6)
ALP SERPL-CCNC: 183 U/L (ref 50–136)
ALT SERPL-CCNC: 30 U/L (ref 12–65)
ANION GAP SERPL CALC-SCNC: 7 MMOL/L (ref 2–11)
AST SERPL-CCNC: 18 U/L (ref 15–37)
BILIRUB SERPL-MCNC: 0.4 MG/DL (ref 0.2–1.1)
BUN SERPL-MCNC: 9 MG/DL (ref 8–23)
CALCIUM SERPL-MCNC: 9.5 MG/DL (ref 8.3–10.4)
CHLORIDE SERPL-SCNC: 98 MMOL/L (ref 103–113)
CO2 SERPL-SCNC: 26 MMOL/L (ref 21–32)
CREAT SERPL-MCNC: 0.96 MG/DL (ref 0.6–1)
EKG ATRIAL RATE: 102 BPM
EKG DIAGNOSIS: NORMAL
EKG P AXIS: 42 DEGREES
EKG P-R INTERVAL: 150 MS
EKG Q-T INTERVAL: 326 MS
EKG QRS DURATION: 80 MS
EKG QTC CALCULATION (BAZETT): 424 MS
EKG R AXIS: 13 DEGREES
EKG T AXIS: 0 DEGREES
EKG VENTRICULAR RATE: 102 BPM
ERYTHROCYTE [DISTWIDTH] IN BLOOD BY AUTOMATED COUNT: 14.2 % (ref 11.9–14.6)
GLOBULIN SER CALC-MCNC: 4 G/DL (ref 2.8–4.5)
GLUCOSE SERPL-MCNC: 385 MG/DL (ref 65–100)
HCT VFR BLD AUTO: 38.8 % (ref 35.8–46.3)
HGB BLD-MCNC: 12.4 G/DL (ref 11.7–15.4)
LIPASE SERPL-CCNC: 165 U/L (ref 73–393)
MCH RBC QN AUTO: 24.3 PG (ref 26.1–32.9)
MCHC RBC AUTO-ENTMCNC: 32 G/DL (ref 31.4–35)
MCV RBC AUTO: 75.9 FL (ref 82–102)
NRBC # BLD: 0 K/UL (ref 0–0.2)
PLATELET # BLD AUTO: 480 K/UL (ref 150–450)
PMV BLD AUTO: 9 FL (ref 9.4–12.3)
POTASSIUM SERPL-SCNC: 3.9 MMOL/L (ref 3.5–5.1)
PROT SERPL-MCNC: 7.9 G/DL (ref 6.3–8.2)
RBC # BLD AUTO: 5.11 M/UL (ref 4.05–5.2)
SODIUM SERPL-SCNC: 131 MMOL/L (ref 136–146)
TROPONIN I SERPL HS-MCNC: 3.2 PG/ML (ref 0–14)
TROPONIN I SERPL HS-MCNC: <3 PG/ML (ref 0–14)
WBC # BLD AUTO: 9.4 K/UL (ref 4.3–11.1)

## 2024-01-20 PROCEDURE — 71045 X-RAY EXAM CHEST 1 VIEW: CPT

## 2024-01-20 PROCEDURE — 83690 ASSAY OF LIPASE: CPT

## 2024-01-20 PROCEDURE — 85027 COMPLETE CBC AUTOMATED: CPT

## 2024-01-20 PROCEDURE — 96374 THER/PROPH/DIAG INJ IV PUSH: CPT

## 2024-01-20 PROCEDURE — 80053 COMPREHEN METABOLIC PANEL: CPT

## 2024-01-20 PROCEDURE — 6360000002 HC RX W HCPCS: Performed by: EMERGENCY MEDICINE

## 2024-01-20 PROCEDURE — 93005 ELECTROCARDIOGRAM TRACING: CPT | Performed by: EMERGENCY MEDICINE

## 2024-01-20 PROCEDURE — 99285 EMERGENCY DEPT VISIT HI MDM: CPT

## 2024-01-20 PROCEDURE — 93010 ELECTROCARDIOGRAM REPORT: CPT | Performed by: INTERNAL MEDICINE

## 2024-01-20 PROCEDURE — 84484 ASSAY OF TROPONIN QUANT: CPT

## 2024-01-20 PROCEDURE — 96375 TX/PRO/DX INJ NEW DRUG ADDON: CPT

## 2024-01-20 RX ORDER — BUTALBITAL, ACETAMINOPHEN AND CAFFEINE 50; 325; 40 MG/1; MG/1; MG/1
1 TABLET ORAL EVERY 6 HOURS PRN
Qty: 15 TABLET | Refills: 0 | Status: SHIPPED | OUTPATIENT
Start: 2024-01-20

## 2024-01-20 RX ORDER — DEXAMETHASONE SODIUM PHOSPHATE 10 MG/ML
10 INJECTION INTRAMUSCULAR; INTRAVENOUS
Status: COMPLETED | OUTPATIENT
Start: 2024-01-20 | End: 2024-01-20

## 2024-01-20 RX ORDER — KETOROLAC TROMETHAMINE 15 MG/ML
15 INJECTION, SOLUTION INTRAMUSCULAR; INTRAVENOUS
Status: COMPLETED | OUTPATIENT
Start: 2024-01-20 | End: 2024-01-20

## 2024-01-20 RX ORDER — MECLIZINE HYDROCHLORIDE 25 MG/1
25 TABLET ORAL 3 TIMES DAILY PRN
Qty: 30 TABLET | Refills: 0 | Status: SHIPPED | OUTPATIENT
Start: 2024-01-20 | End: 2024-02-09

## 2024-01-20 RX ORDER — DIPHENHYDRAMINE HYDROCHLORIDE 50 MG/ML
25 INJECTION INTRAMUSCULAR; INTRAVENOUS
Status: COMPLETED | OUTPATIENT
Start: 2024-01-20 | End: 2024-01-20

## 2024-01-20 RX ORDER — METOCLOPRAMIDE HYDROCHLORIDE 5 MG/ML
10 INJECTION INTRAMUSCULAR; INTRAVENOUS ONCE
Status: COMPLETED | OUTPATIENT
Start: 2024-01-20 | End: 2024-01-20

## 2024-01-20 RX ADMIN — KETOROLAC TROMETHAMINE 15 MG: 15 INJECTION, SOLUTION INTRAMUSCULAR; INTRAVENOUS at 18:31

## 2024-01-20 RX ADMIN — DIPHENHYDRAMINE HYDROCHLORIDE 25 MG: 50 INJECTION INTRAMUSCULAR; INTRAVENOUS at 18:33

## 2024-01-20 RX ADMIN — DEXAMETHASONE SODIUM PHOSPHATE 10 MG: 10 INJECTION INTRAMUSCULAR; INTRAVENOUS at 20:46

## 2024-01-20 RX ADMIN — METOCLOPRAMIDE 10 MG: 5 INJECTION, SOLUTION INTRAMUSCULAR; INTRAVENOUS at 18:32

## 2024-01-20 ASSESSMENT — ENCOUNTER SYMPTOMS
VOMITING: 0
SHORTNESS OF BREATH: 0
BACK PAIN: 1
NAUSEA: 0
COUGH: 0
RHINORRHEA: 0
COLOR CHANGE: 0
DIARRHEA: 0
ABDOMINAL PAIN: 0

## 2024-01-20 ASSESSMENT — PAIN - FUNCTIONAL ASSESSMENT: PAIN_FUNCTIONAL_ASSESSMENT: 0-10

## 2024-01-20 ASSESSMENT — PAIN SCALES - GENERAL
PAINLEVEL_OUTOF10: 10
PAINLEVEL_OUTOF10: 5
PAINLEVEL_OUTOF10: 10

## 2024-01-20 ASSESSMENT — PAIN DESCRIPTION - PAIN TYPE: TYPE: ACUTE PAIN

## 2024-01-20 ASSESSMENT — PAIN DESCRIPTION - LOCATION: LOCATION: BACK

## 2024-01-20 NOTE — ED TRIAGE NOTES
Chest pain and headache for the past 3-4 days. Used OTC medication without success. Patient also having lower back pain.

## 2024-01-21 NOTE — ED NOTES
I have reviewed discharge instructions with the patient.  The patient verbalized understanding.    Patient left ED via Discharge Method: ambulatory to Home with self    Opportunity for questions and clarification provided.       Patient given 1 scripts.         To continue your aftercare when you leave the hospital, you may receive an automated call from our care team to check in on how you are doing.  This is a free service and part of our promise to provide the best care and service to meet your aftercare needs.” If you have questions, or wish to unsubscribe from this service please call [phone].  Thank you for Choosing our [facility] Emergency Department.

## 2024-01-21 NOTE — ED PROVIDER NOTES
Emergency Department Provider Note       PCP: None, None   Age: 63 y.o.   Sex: female     DISPOSITION Discharge - Pending Orders Complete 01/20/2024 07:52:32 PM       ICD-10-CM    1. Atypical chest pain  R07.89       2. Chronic pain syndrome  G89.4       3. History of vertigo  Z87.898           Medical Decision Making     Complexity of Problems Addressed:  1 or more acute illnesses that pose a threat to life or bodily function.   Chronic pain    Data Reviewed and Analyzed:   I independently ordered and reviewed each unique test.  I reviewed external records: provider visit note from PCP.  I reviewed external records: provider visit note from outside specialist.       I independently ordered and interpreted the ED EKG in the absence of a Cardiologist.    Rate: 102  EKG Interpretation: EKG Interpretation: sinus rhythm, no evidence of arrhythmia and non-specific EKG  ST Segments: Nonspecific ST segments - NO STEMI      I interpreted the X-rays chest x-ray showed no acute process and a normal cardiomediastinal silhouette.    Discussion of management or test interpretation.  Atypical chest pain complained of really only in triage.  Complaints in the back included chronic intermittent dizziness, chronic neck and back pain and being out of pain medication.  Multiple requests for pain medication.  Ruled out for MI with serial troponins.  Tachycardia felt due to pain and doubt PE.  No sharp pleuritic pain noted.  Normal neurologic exam and normal cerebellar exam.  Patient just recently had CT scan imaging of the brain and neck for trauma.  I feel it is better to limit her radiation exposure.  The end of the visit she requested and was provided a refill for meclizine for her vertigo and butalbital/Tylenol/caffeine.  Percocet and pain shot in ED were declined      Risk of Complications and/or Morbidity of Patient Management:  Prescription drug management performed.  Chronic medical problems impacting care include chronic

## 2024-01-22 ENCOUNTER — TELEPHONE (OUTPATIENT)
Dept: NEUROSURGERY | Age: 64
End: 2024-01-22

## 2024-01-22 ENCOUNTER — CLINICAL DOCUMENTATION (OUTPATIENT)
Dept: NEUROSURGERY | Age: 64
End: 2024-01-22

## 2024-01-22 DIAGNOSIS — G95.9 CERVICAL MYELOPATHY (HCC): Primary | ICD-10-CM

## 2024-01-22 DIAGNOSIS — M54.16 LUMBAR RADICULOPATHY: ICD-10-CM

## 2024-01-22 DIAGNOSIS — M48.02 CERVICAL SPINAL STENOSIS: ICD-10-CM

## 2024-01-22 DIAGNOSIS — G89.4 CHRONIC PAIN SYNDROME: ICD-10-CM

## 2024-01-22 DIAGNOSIS — M43.16 SPONDYLOLISTHESIS AT L4-L5 LEVEL: ICD-10-CM

## 2024-01-22 NOTE — PROGRESS NOTES
Misplaced pts office router which tells us what Dr. Navarro's recommendations are.   Sw Dr. Navarro states he wants MRI of Cervical and Lumbar Spine.   Just fyi.

## 2024-01-22 NOTE — TELEPHONE ENCOUNTER
Patient would also like 2 referral placed to [facility] for pain and [facility]- per her insurance

## 2024-01-22 NOTE — TELEPHONE ENCOUNTER
Patient wanting to get in ASAP with pain management- advised her last pain management referral was just placed one business day ago- there have been x 5 pain management referrals placed since ; advised her pain management to review and call her with an appointment  Per Dr. Navarro please obtain MRIs of Cervical and lumbar WO

## 2024-01-23 ENCOUNTER — TELEPHONE (OUTPATIENT)
Dept: BEHAVIORAL/MENTAL HEALTH CLINIC | Age: 64
End: 2024-01-23

## 2024-01-23 DIAGNOSIS — F41.1 GENERALIZED ANXIETY DISORDER: ICD-10-CM

## 2024-01-23 DIAGNOSIS — G47.9 SLEEP DIFFICULTIES: ICD-10-CM

## 2024-01-23 DIAGNOSIS — F33.0 MILD EPISODE OF RECURRENT MAJOR DEPRESSIVE DISORDER (HCC): ICD-10-CM

## 2024-01-23 RX ORDER — DULOXETIN HYDROCHLORIDE 60 MG/1
60 CAPSULE, DELAYED RELEASE ORAL 2 TIMES DAILY
Qty: 60 CAPSULE | Refills: 0 | Status: SHIPPED | OUTPATIENT
Start: 2024-01-23

## 2024-01-23 RX ORDER — HYDROXYZINE PAMOATE 50 MG/1
50 CAPSULE ORAL
Qty: 30 CAPSULE | Refills: 0 | Status: SHIPPED | OUTPATIENT
Start: 2024-01-23

## 2024-01-23 RX ORDER — DULOXETIN HYDROCHLORIDE 60 MG/1
60 CAPSULE, DELAYED RELEASE ORAL 2 TIMES DAILY
Qty: 180 CAPSULE | OUTPATIENT
Start: 2024-01-23

## 2024-01-23 NOTE — TELEPHONE ENCOUNTER
Patient will not be able to be seen here as her insurance has changed to [employer] and we do not accept it. Had to cancel appt. Patient is requesting refill while she waits to be seen by an in network provider.

## 2024-01-23 NOTE — TELEPHONE ENCOUNTER
Patient Name: Sonali Mcarthur [facility]   YOB: 1954 [address]   MRN: [id number]    [place] [address]       Date of Service: 2/7/2022    Subjective       Chief Complaint   Patient presents with   • Follow-up   • Pain     low back pain    • Other     declines flu vaccine          This Clinic Visit:  Mr. Mcarthur is a 67-year-old male with a history of hypertension, diabetes type 2, abdominal pain, dizziness, and heartburn who is presenting today following a motor vehicle collision.  Patient states that he was stopped at a stop sign with his wife when a car rear-ended them.  Patient does not know how fast the other  was going.  Patient and his wife were restrained and airbags were not deployed.  Patient did not lose consciousness and did not hit his head on the steering wheel.  He states that the other  again ran into them while trying to get away.  Patient has chronic back pain; however, his back pain in his lumbar spine has acutely worsened since the accident.  He also complains of right posterior shoulder pain and bilateral rib pain, which makes it difficult for him to take deep breaths.  Patient has taken some Tylenol at home without relief of symptoms.  He denies vision changes, focal neurological deficits, numbness or tingling, weakness.  His lumbar back pain is described as midline without radiation    In regards his diabetes, patient states that his diet has stayed the same; however, he has been exercising slightly more with his wife.  After his last clinic appointment, we discussed over the phone his rising A1c and he had stated that he would like to continue lifestyle modifications before increasing any of his medications or restarting his Januvia.    Patient's medications, allergies, past medical, surgical, social and family histories were reviewed and updated as appropriate.    Review of Systems  Pt requesting medication refill on all medication (pt does not know th medications name, pt refuse to give me any information on medication) please call pt back, thank you.     Constitutional: Negative for chills, fatigue and fever.   HENT: Negative for congestion, rhinorrhea and sore throat.    Respiratory: Negative for cough, chest tightness and shortness of breath.    Cardiovascular: Negative for chest pain, palpitations and leg swelling.   Gastrointestinal: Negative for abdominal distention, constipation, diarrhea, nausea and vomiting.   Genitourinary: Negative for dysuria, frequency and urgency.   Musculoskeletal: Positive for back pain (lumbar). Negative for arthralgias and myalgias.        Right posterior shoulder pain, bilateral rib pain   Skin: Negative for rash.   Neurological: Positive for headaches. Negative for dizziness, weakness and numbness.       Objective     Vitals:    02/07/22 1007   BP: 138/76   Pulse: 84   Resp: 18   Temp: 98.5 °F (36.9 °C)     Physical Exam  Constitutional:       Appearance: He is obese.   HENT:      Head: Normocephalic and atraumatic.      Mouth/Throat:      Mouth: Mucous membranes are moist.      Pharynx: No oropharyngeal exudate or posterior oropharyngeal erythema.      Neck: Neck supple. No tenderness. No muscular tenderness.   Eyes:      Extraocular Movements: Extraocular movements intact.   Cardiovascular:      Rate and Rhythm: Normal rate and regular rhythm.      Pulses: Normal pulses.      Heart sounds: Normal heart sounds.   Pulmonary:      Effort: Pulmonary effort is normal. No respiratory distress.      Breath sounds: Normal breath sounds.   Abdominal:      General: Bowel sounds are normal.      Palpations: Abdomen is soft.      Tenderness: There is no abdominal tenderness.   Musculoskeletal:         General: No swelling or deformity.      Right shoulder: Tenderness (posterior) present.      Thoracic back: No tenderness.      Lumbar back: Tenderness and bony tenderness (midline) present.      Right lower leg: Tenderness (posterior calf) present.   Skin:     General: Skin is warm and dry.   Neurological:      General: No focal deficit  [id number]                                  Authorizing Provider:  Brendan Marquez [facility])              Follow-up: 3 months for A1C recheck    Jordi Oliveros, DO  PGY-2  Dept of Internal Medicine  [facility]  2/8/2022

## 2024-02-01 ENCOUNTER — HOSPITAL ENCOUNTER (EMERGENCY)
Age: 64
Discharge: HOME | End: 2024-02-01
Attending: EMERGENCY MEDICINE
Payer: COMMERCIAL

## 2024-02-01 VITALS
HEART RATE: 95 BPM | TEMPERATURE: 97.9 F | OXYGEN SATURATION: 96 % | RESPIRATION RATE: 16 BRPM | SYSTOLIC BLOOD PRESSURE: 144 MMHG | DIASTOLIC BLOOD PRESSURE: 95 MMHG

## 2024-02-01 DIAGNOSIS — G89.4 CHRONIC PAIN SYNDROME: Primary | ICD-10-CM

## 2024-02-01 DIAGNOSIS — K12.0 APHTHOUS ULCER: ICD-10-CM

## 2024-02-01 PROCEDURE — 96372 THER/PROPH/DIAG INJ SC/IM: CPT

## 2024-02-01 PROCEDURE — 6360000002 HC RX W HCPCS: Performed by: EMERGENCY MEDICINE

## 2024-02-01 PROCEDURE — 99284 EMERGENCY DEPT VISIT MOD MDM: CPT

## 2024-02-01 RX ORDER — KETOROLAC TROMETHAMINE 30 MG/ML
30 INJECTION, SOLUTION INTRAMUSCULAR; INTRAVENOUS
Status: COMPLETED | OUTPATIENT
Start: 2024-02-01 | End: 2024-02-01

## 2024-02-01 RX ORDER — OXYCODONE AND ACETAMINOPHEN 7.5; 325 MG/1; MG/1
1 TABLET ORAL EVERY 6 HOURS PRN
Qty: 12 TABLET | Refills: 0 | Status: SHIPPED | OUTPATIENT
Start: 2024-02-01 | End: 2024-02-04

## 2024-02-01 RX ADMIN — KETOROLAC TROMETHAMINE 30 MG: 30 INJECTION, SOLUTION INTRAMUSCULAR; INTRAVENOUS at 02:46

## 2024-02-01 ASSESSMENT — PAIN SCALES - GENERAL
PAINLEVEL_OUTOF10: 8
PAINLEVEL_OUTOF10: 10

## 2024-02-01 ASSESSMENT — LIFESTYLE VARIABLES
HOW OFTEN DO YOU HAVE A DRINK CONTAINING ALCOHOL: NEVER
HOW MANY STANDARD DRINKS CONTAINING ALCOHOL DO YOU HAVE ON A TYPICAL DAY: PATIENT DOES NOT DRINK

## 2024-02-01 ASSESSMENT — PAIN - FUNCTIONAL ASSESSMENT: PAIN_FUNCTIONAL_ASSESSMENT: 0-10

## 2024-02-01 ASSESSMENT — PAIN DESCRIPTION - LOCATION: LOCATION: HEAD

## 2024-02-01 NOTE — ED NOTES
I have reviewed discharge instructions with the patient.  The patient verbalized understanding.    Patient left ED via Discharge Method: ambulatory to Home with self.    Opportunity for questions and clarification provided.       Patient given 1 scripts.         To continue your aftercare when you leave the hospital, you may receive an automated call from our care team to check in on how you are doing.  This is a free service and part of our promise to provide the best care and service to meet your aftercare needs.” If you have questions, or wish to unsubscribe from this service please call [phone].  Thank you for Choosing our [facility] Emergency Department.

## 2024-02-01 NOTE — ED TRIAGE NOTES
Ambulatory to triage  Pt states she had a fall in December and her symptoms have not been improving since, pt states she is now having a headache, vomiting, dry mouth. Pt states she also has chronic pain and vertigo as well that is causing her some issues.

## 2024-02-01 NOTE — ED PROVIDER NOTES
Emergency Department Provider Note       PCP: None, None   Age: 63 y.o.   Sex: female     DISPOSITION Decision To Discharge 02/01/2024 02:37:10 AM       ICD-10-CM    1. Chronic pain syndrome  G89.4 oxyCODONE-acetaminophen (PERCOCET) 7.5-325 MG per tablet      2. Aphthous ulcer  K12.0 oxyCODONE-acetaminophen (PERCOCET) 7.5-325 MG per tablet          Medical Decision Making     Complexity of Problems Addressed:  Complexity of Problem: 1 chronic illness with exacerbation.    Data Reviewed and Analyzed:  I independently ordered and reviewed each unique test.             Discussion of management or test interpretation.  Patient was medicated with IM Toradol and prescriptions given for chronic pain management.  Patient to follow-up with primary care.       Risk of Complications and/or Morbidity of Patient Management:  Prescription drug management performed and Shared medical decision making was utilized in creating the patients health plan today.        History      Patient presents with multiple complaints all related to chronic pain.  She complains of severe sciatica as well as back and neck pain and headache that has been going on for longstanding period of time.  She has no relief with over-the-counter pain medications.  She is also experiencing some pain in her mouth and concerned about ulcers on her gums.  She does wear dentures.  She suffers from vertigo as well.  She states that pain symptoms she is having are not different from previously however it is uncontrolled.  She did drive herself to the emergency department.    The history is provided by the patient and medical records.        Physical Exam     Vitals signs and nursing note reviewed.   Vitals:    02/01/24 0137   BP: 122/67   Pulse: (!) 107   Resp: 17   Temp: 97.9 °F (36.6 °C)   TempSrc: Oral   SpO2: 94%       Physical Exam  Vitals and nursing note reviewed.   Constitutional:       General: She is not in acute distress.     Appearance: Normal

## 2024-02-06 ENCOUNTER — HOSPITAL ENCOUNTER (EMERGENCY)
Age: 64
Discharge: HOME | End: 2024-02-06
Attending: STUDENT IN AN ORGANIZED HEALTH CARE EDUCATION/TRAINING PROGRAM
Payer: COMMERCIAL

## 2024-02-06 VITALS
HEART RATE: 102 BPM | RESPIRATION RATE: 18 BRPM | HEIGHT: 65 IN | BODY MASS INDEX: 34.66 KG/M2 | SYSTOLIC BLOOD PRESSURE: 129 MMHG | WEIGHT: 208 LBS | OXYGEN SATURATION: 94 % | DIASTOLIC BLOOD PRESSURE: 84 MMHG | TEMPERATURE: 98.4 F

## 2024-02-06 DIAGNOSIS — G89.29 OTHER CHRONIC PAIN: Primary | ICD-10-CM

## 2024-02-06 DIAGNOSIS — R73.9 HYPERGLYCEMIA: ICD-10-CM

## 2024-02-06 LAB
GLUCOSE BLD STRIP.AUTO-MCNC: 314 MG/DL (ref 65–100)
SERVICE CMNT-IMP: ABNORMAL

## 2024-02-06 PROCEDURE — 96372 THER/PROPH/DIAG INJ SC/IM: CPT

## 2024-02-06 PROCEDURE — 82962 GLUCOSE BLOOD TEST: CPT

## 2024-02-06 PROCEDURE — 6360000002 HC RX W HCPCS: Performed by: STUDENT IN AN ORGANIZED HEALTH CARE EDUCATION/TRAINING PROGRAM

## 2024-02-06 PROCEDURE — 99284 EMERGENCY DEPT VISIT MOD MDM: CPT

## 2024-02-06 RX ORDER — KETOROLAC TROMETHAMINE 15 MG/ML
15 INJECTION, SOLUTION INTRAMUSCULAR; INTRAVENOUS ONCE
Status: COMPLETED | OUTPATIENT
Start: 2024-02-06 | End: 2024-02-06

## 2024-02-06 RX ORDER — MELOXICAM 15 MG/1
15 TABLET ORAL DAILY
Qty: 20 TABLET | Refills: 0 | Status: SHIPPED | OUTPATIENT
Start: 2024-02-06

## 2024-02-06 RX ADMIN — KETOROLAC TROMETHAMINE 15 MG: 15 INJECTION, SOLUTION INTRAMUSCULAR; INTRAVENOUS at 01:53

## 2024-02-06 ASSESSMENT — PAIN SCALES - GENERAL: PAINLEVEL_OUTOF10: 8

## 2024-02-06 ASSESSMENT — PAIN DESCRIPTION - ORIENTATION: ORIENTATION: RIGHT

## 2024-02-06 ASSESSMENT — LIFESTYLE VARIABLES
HOW MANY STANDARD DRINKS CONTAINING ALCOHOL DO YOU HAVE ON A TYPICAL DAY: PATIENT DOES NOT DRINK
HOW OFTEN DO YOU HAVE A DRINK CONTAINING ALCOHOL: NEVER

## 2024-02-06 ASSESSMENT — PAIN DESCRIPTION - LOCATION: LOCATION: BACK;LEG;NECK

## 2024-02-06 ASSESSMENT — PAIN DESCRIPTION - DESCRIPTORS: DESCRIPTORS: SORE

## 2024-02-06 NOTE — ED PROVIDER NOTES
(See Comments)     Physical Exam     Vitals:    02/06/24 0129   BP: 129/76   Pulse: (!) 102   Resp: 16   Temp: 98.4 °F (36.9 °C)   TempSrc: Oral   SpO2: 95%   Weight: 94.3 kg (208 lb)   Height: 1.651 m (5' 5\")     Nursing note and vitals reviewed.    Constitutional: Well developed, NAD  HEENT: Atraumatic, conjugate gaze, EOM intact  Neck: Supple  Cardiovascular: No cyanosis, diaphoresis, or JVD appreciated.  Respiratory: Effort normal. No respiratory distress.  Gastrointestinal: Non-distended.  MSK: No deformities appreciated. No peripheral edema.  Skin: Skin is warm and dry. No rash appreciated.  Neuro: Alert and oriented, moves all four extremities. 5/5 strength lower extremities. Sensation intact lower extremities.  Psych: Pleasant and cooperative.    Procedures   Procedures    MDM     Labs Reviewed   POCT GLUCOSE - Abnormal; Notable for the following components:       Result Value    POC Glucose 314 (*)     All other components within normal limits   POCT GLUCOSE     Medications   ketorolac (TORADOL) injection 15 mg (15 mg IntraMUSCular Given 2/6/24 0153)     No orders to display     Voice dictation software was used during the making of this note.  This software is not perfect and grammatical and other typographical errors may be present.  This note has not been completely proofread for errors.     Aaron Polanco MD  02/06/24 0070

## 2024-02-06 NOTE — ED NOTES
I have reviewed discharge instructions with the patient.  The patient verbalized understanding.    Patient left ED via Discharge Method: ambulatory to Home with self.    Opportunity for questions and clarification provided.       Patient given 1 scripts.         To continue your aftercare when you leave the hospital, you may receive an automated call from our care team to check in on how you are doing.  This is a free service and part of our promise to provide the best care and service to meet your aftercare needs.” If you have questions, or wish to unsubscribe from this service please call [phone].  Thank you for Choosing our [facility] Emergency Department.

## 2024-02-06 NOTE — DISCHARGE INSTRUCTIONS
Your blood sugar today was elevated. I recommend you start taking the Metformin medication as prescribed. Please follow up with your primary care doctor in regards to your blood sugar and your A1C which was also elevated. You may contact the Pain Management clinic as well for follow up. Return to the ER if any new or worsening symptoms.    [facility]  [address]  [id number] [phone]

## 2024-02-06 NOTE — ED TRIAGE NOTES
Patient states her \"sciatica is acting up\" and she has vertigo, her nerves are on edge, and her doctor called to tell her one of her labs was \"300\" and she is unsure what the lab is. Patient states her symptoms are getting worse. Patient states \"I want a pain shot and some medicine.\"

## 2024-02-08 ENCOUNTER — APPOINTMENT (OUTPATIENT)
Dept: CT IMAGING | Age: 64
End: 2024-02-08
Payer: COMMERCIAL

## 2024-02-08 ENCOUNTER — HOSPITAL ENCOUNTER (EMERGENCY)
Age: 64
Discharge: HOME | End: 2024-02-08
Payer: COMMERCIAL

## 2024-02-08 VITALS
SYSTOLIC BLOOD PRESSURE: 123 MMHG | OXYGEN SATURATION: 95 % | WEIGHT: 207 LBS | BODY MASS INDEX: 34.49 KG/M2 | TEMPERATURE: 98.3 F | HEIGHT: 65 IN | RESPIRATION RATE: 18 BRPM | DIASTOLIC BLOOD PRESSURE: 84 MMHG | HEART RATE: 93 BPM

## 2024-02-08 DIAGNOSIS — G89.4 CHRONIC PAIN SYNDROME: Primary | ICD-10-CM

## 2024-02-08 PROCEDURE — 6360000002 HC RX W HCPCS: Performed by: PHYSICIAN ASSISTANT

## 2024-02-08 PROCEDURE — 99284 EMERGENCY DEPT VISIT MOD MDM: CPT

## 2024-02-08 PROCEDURE — 96372 THER/PROPH/DIAG INJ SC/IM: CPT

## 2024-02-08 PROCEDURE — 70450 CT HEAD/BRAIN W/O DYE: CPT

## 2024-02-08 RX ORDER — KETOROLAC TROMETHAMINE 30 MG/ML
30 INJECTION, SOLUTION INTRAMUSCULAR; INTRAVENOUS ONCE
Status: COMPLETED | OUTPATIENT
Start: 2024-02-08 | End: 2024-02-08

## 2024-02-08 RX ORDER — KETOROLAC TROMETHAMINE 15 MG/ML
INJECTION, SOLUTION INTRAMUSCULAR; INTRAVENOUS
Status: DISCONTINUED
Start: 2024-02-08 | End: 2024-02-08 | Stop reason: WASHOUT

## 2024-02-08 RX ADMIN — KETOROLAC TROMETHAMINE 30 MG: 30 INJECTION, SOLUTION INTRAMUSCULAR; INTRAVENOUS at 17:03

## 2024-02-08 ASSESSMENT — PAIN - FUNCTIONAL ASSESSMENT: PAIN_FUNCTIONAL_ASSESSMENT: 0-10

## 2024-02-08 ASSESSMENT — PAIN DESCRIPTION - LOCATION: LOCATION: GENERALIZED

## 2024-02-08 ASSESSMENT — PAIN SCALES - GENERAL: PAINLEVEL_OUTOF10: 10

## 2024-02-08 NOTE — ED TRIAGE NOTES
Patient presents ambulatory after seeing a providers at [facility] yesterday.  Patient has multitude of complaints and medication refill needs.  Patient states that she sees a number of doctors and the provider at [facility] suggested she come to the ER.  Patient states that [facility] provider stated she should have a Head CT for \"memory problems.\"  Patient c/o 10/10 generalized pain and mentions history of vertigo and sciatica.  VSS.

## 2024-02-08 NOTE — DISCHARGE INSTRUCTIONS
It is not appropriate to refill controlled prescriptions from the emergency room such as your prescriptions for Ativan and oxycodone.  You do contact your primary care physician.    Nothing on your exam or workup today suggests an emergent or life-threatening process occurring today.  Please follow-up with your primary care physician.

## 2024-02-08 NOTE — ED NOTES
I have reviewed discharge instructions with the patient.  The patient verbalized understanding.    Patient left ED via Discharge Method: ambulatory to Home with self.    Opportunity for questions and clarification provided.       Patient given 0 scripts.         To continue your aftercare when you leave the hospital, you may receive an automated call from our care team to check in on how you are doing.  This is a free service and part of our promise to provide the best care and service to meet your aftercare needs.” If you have questions, or wish to unsubscribe from this service please call [phone].  Thank you for Choosing our [facility] Emergency Department.

## 2024-02-08 NOTE — ED PROVIDER NOTES
Emergency Department Provider Note       PCP: None, None   Age: 63 y.o.   Sex: female     DISPOSITION Decision To Discharge 02/08/2024 05:26:59 PM       ICD-10-CM    1. Chronic pain syndrome  G89.4           Medical Decision Making     Complexity of Problems Addressed:  Complexity of Problem: 1 acute complicated illness or injury.    Data Reviewed and Analyzed:  I independently ordered and reviewed each unique test.  I reviewed external records: ED visit note from an outside group.  I reviewed external records: provider visit note from PCP.  I reviewed external records: provider visit note from outside specialist.  I reviewed external records: previous lab results from outside ED.    Discussion of management or test interpretation.  Patient with chronic pain syndrome presents here for refills of oxycodone and Ativan.  I had in-depth discussion with patient's regarding proper use of ER and that we do not refill controlled prescriptions here.  Patient is discharged home with instructions to follow back up with PCP       Risk of Complications and/or Morbidity of Patient Management:  OTC drug management performed, Prescription drug management performed, and Shared medical decision making was utilized in creating the patients health plan today.    History      63-year-old female presents here with chief complaint of requesting refills for her Ativan and oxycodone.  She has history of chronic pain syndrome.  She denies any falls or trauma.  She states she was seen here recently and was given a refill of his oxycodone so she is here again for this.  No other medical complaints    The history is provided by the patient. No  was used.        Physical Exam     Vitals signs and nursing note reviewed.   Vitals:    02/08/24 1554   BP: 123/84   Pulse: 93   Resp: 18   Temp: 98.3 °F (36.8 °C)   TempSrc: Oral   SpO2: 95%   Weight: 93.9 kg (207 lb)   Height: 1.651 m (5' 5\")       Physical Exam  Vitals and

## 2024-02-13 ENCOUNTER — HOSPITAL ENCOUNTER (EMERGENCY)
Age: 64
Discharge: HOME | End: 2024-02-13
Attending: STUDENT IN AN ORGANIZED HEALTH CARE EDUCATION/TRAINING PROGRAM
Payer: COMMERCIAL

## 2024-02-13 VITALS
HEIGHT: 65 IN | WEIGHT: 207 LBS | OXYGEN SATURATION: 97 % | BODY MASS INDEX: 34.49 KG/M2 | HEART RATE: 95 BPM | TEMPERATURE: 98.6 F | DIASTOLIC BLOOD PRESSURE: 70 MMHG | RESPIRATION RATE: 17 BRPM | SYSTOLIC BLOOD PRESSURE: 131 MMHG

## 2024-02-13 DIAGNOSIS — Z76.0 ENCOUNTER FOR MEDICATION REFILL: ICD-10-CM

## 2024-02-13 DIAGNOSIS — G89.29 OTHER CHRONIC PAIN: Primary | ICD-10-CM

## 2024-02-13 PROCEDURE — 6360000002 HC RX W HCPCS: Performed by: STUDENT IN AN ORGANIZED HEALTH CARE EDUCATION/TRAINING PROGRAM

## 2024-02-13 PROCEDURE — 99285 EMERGENCY DEPT VISIT HI MDM: CPT

## 2024-02-13 PROCEDURE — 96372 THER/PROPH/DIAG INJ SC/IM: CPT

## 2024-02-13 RX ORDER — MECLIZINE HYDROCHLORIDE 25 MG/1
25 TABLET ORAL 3 TIMES DAILY PRN
Qty: 30 TABLET | Refills: 0 | Status: SHIPPED | OUTPATIENT
Start: 2024-02-13 | End: 2024-02-23

## 2024-02-13 RX ORDER — BUTALBITAL, ACETAMINOPHEN, CAFFEINE AND CODEINE PHOSPHATE 300; 50; 40; 30 MG/1; MG/1; MG/1; MG/1
1 CAPSULE ORAL EVERY 4 HOURS PRN
Qty: 14 CAPSULE | Refills: 0 | Status: SHIPPED | OUTPATIENT
Start: 2024-02-13 | End: 2024-02-23

## 2024-02-13 RX ORDER — GUAIFENESIN 600 MG/1
600 TABLET, EXTENDED RELEASE ORAL 2 TIMES DAILY
Qty: 30 TABLET | Refills: 0 | Status: SHIPPED | OUTPATIENT
Start: 2024-02-13 | End: 2024-02-28

## 2024-02-13 RX ORDER — KETOROLAC TROMETHAMINE 15 MG/ML
15 INJECTION, SOLUTION INTRAMUSCULAR; INTRAVENOUS ONCE
Status: COMPLETED | OUTPATIENT
Start: 2024-02-13 | End: 2024-02-13

## 2024-02-13 RX ADMIN — KETOROLAC TROMETHAMINE 15 MG: 15 INJECTION, SOLUTION INTRAMUSCULAR; INTRAVENOUS at 05:58

## 2024-02-13 NOTE — ED NOTES
I have reviewed discharge instructions with the patient.  The patient verbalized understanding.    Patient left ED via Discharge Method: ambulatory to Home with self    Opportunity for questions and clarification provided.       Patient given 2 scripts.         To continue your aftercare when you leave the hospital, you may receive an automated call from our care team to check in on how you are doing.  This is a free service and part of our promise to provide the best care and service to meet your aftercare needs.” If you have questions, or wish to unsubscribe from this service please call [phone].  Thank you for Choosing our [facility] Emergency Department.

## 2024-02-13 NOTE — ED TRIAGE NOTES
Ambulatory with steady gait to room 5. Requesting refill on medications including fioricet and mucinex. Reports \"I have chronic sinusitis, chronic vertigo and sciatica\". Argumentative with staff during triage process. When requesting medication refill, pt presents bottle for fioricet however noted to have multiple different pills in same pill bottle. When questioned as to reasoning, pt becomes defensive and demands medication bottle back.

## 2024-02-13 NOTE — ED PROVIDER NOTES
[facility]  Free-standing Emergency Department    DISPOSITION Decision To Discharge 02/13/2024 05:49:16 AM       ICD-10-CM    1. Other chronic pain  G89.29 butalbital-acetaminophen-caffeine-codeine (FIORICET WITH CODEINE) -96-30 MG per capsule      2. Encounter for medication refill  Z76.0         ED Course     ED Course as of 02/13/24 0554   Tue Feb 13, 2024   0550 63-year-old female with history of chronic pain presents requesting refill of her chronic Fioricet and Mucinex. She has no new complaints at this time.  I did discuss that I will not be refilling any narcotic pain medications in the ER.  She is to follow-up with pain management and her PCP.  I recently placed a referral for pain management for her at her last ER visit. I have also encouraged her to stop mixing her medications in to the same pill bottle as there is possibility of taking the wrong medications. Stable for discharge home.  Return precautions given [ER]      ED Course User Index  [ER] Aaron Polanco MD     Complexity of Problems Addressed:  1 or more chronic illness with acute exacerbation    Data Reviewed and Analyzed:  Category 1:   I independently ordered and reviewed each unique test.  I reviewed external records: ED visit note from an outside group.     Category 2:   I independently ordered and interpreted the ED EKG in the absence of a Cardiologist.      Category 3: Discussion of management or test interpretation.  Please see ED course above    Risk of Complications and/or Morbidity of Patient Management:  Prescription drug management performed.  Chronic medical problems impacting care include chronic pain.     Is this patient to be included in the SEP-1 core measure due to severe sepsis or septic shock? No Exclusion criteria - the patient is NOT to be included for SEP-1 Core Measure due to: Infection is not suspected     HPI   Chasity Carpenter is a 63 y.o. female with a history of chronic

## 2024-02-19 ENCOUNTER — HOSPITAL ENCOUNTER (EMERGENCY)
Age: 64
Discharge: HOME | End: 2024-02-19
Payer: COMMERCIAL

## 2024-02-19 VITALS
DIASTOLIC BLOOD PRESSURE: 76 MMHG | RESPIRATION RATE: 18 BRPM | HEIGHT: 65 IN | HEART RATE: 99 BPM | TEMPERATURE: 98.5 F | SYSTOLIC BLOOD PRESSURE: 118 MMHG | WEIGHT: 207 LBS | OXYGEN SATURATION: 97 % | BODY MASS INDEX: 34.49 KG/M2

## 2024-02-19 DIAGNOSIS — G89.29 OTHER CHRONIC PAIN: ICD-10-CM

## 2024-02-19 DIAGNOSIS — M54.30 SCIATICA, UNSPECIFIED LATERALITY: ICD-10-CM

## 2024-02-19 DIAGNOSIS — E11.65 UNCONTROLLED TYPE 2 DIABETES MELLITUS WITH HYPERGLYCEMIA (HCC): Primary | ICD-10-CM

## 2024-02-19 LAB
ANION GAP SERPL CALC-SCNC: 9 MMOL/L (ref 2–11)
BASOPHILS # BLD: 0.1 K/UL (ref 0–0.2)
BASOPHILS NFR BLD: 1 % (ref 0–2)
BUN SERPL-MCNC: 6 MG/DL (ref 8–23)
CALCIUM SERPL-MCNC: 8.9 MG/DL (ref 8.3–10.4)
CHLORIDE SERPL-SCNC: 100 MMOL/L (ref 98–107)
CO2 SERPL-SCNC: 26 MMOL/L (ref 21–32)
CREAT SERPL-MCNC: 0.57 MG/DL (ref 0.6–1)
DIFFERENTIAL METHOD BLD: ABNORMAL
EOSINOPHIL # BLD: 0.3 K/UL (ref 0–0.8)
EOSINOPHIL NFR BLD: 4 % (ref 0.5–7.8)
ERYTHROCYTE [DISTWIDTH] IN BLOOD BY AUTOMATED COUNT: 14.1 % (ref 11.9–14.6)
GLUCOSE BLD STRIP.AUTO-MCNC: 273 MG/DL (ref 65–100)
GLUCOSE BLD STRIP.AUTO-MCNC: 351 MG/DL (ref 65–100)
GLUCOSE SERPL-MCNC: 351 MG/DL (ref 65–100)
HCT VFR BLD AUTO: 35.8 % (ref 35.8–46.3)
HGB BLD-MCNC: 11.3 G/DL (ref 11.7–15.4)
IMM GRANULOCYTES # BLD AUTO: 0 K/UL (ref 0–0.5)
IMM GRANULOCYTES NFR BLD AUTO: 0 % (ref 0–5)
LYMPHOCYTES # BLD: 3.5 K/UL (ref 0.5–4.6)
LYMPHOCYTES NFR BLD: 47 % (ref 13–44)
MCH RBC QN AUTO: 24.6 PG (ref 26.1–32.9)
MCHC RBC AUTO-ENTMCNC: 31.6 G/DL (ref 31.4–35)
MCV RBC AUTO: 77.8 FL (ref 82–102)
MONOCYTES # BLD: 0.4 K/UL (ref 0.1–1.3)
MONOCYTES NFR BLD: 6 % (ref 4–12)
NEUTS SEG # BLD: 3.1 K/UL (ref 1.7–8.2)
NEUTS SEG NFR BLD: 42 % (ref 43–78)
NRBC # BLD: 0 K/UL (ref 0–0.2)
PLATELET # BLD AUTO: 420 K/UL (ref 150–450)
PMV BLD AUTO: 8.8 FL (ref 9.4–12.3)
POTASSIUM SERPL-SCNC: 3.8 MMOL/L (ref 3.5–5.1)
RBC # BLD AUTO: 4.6 M/UL (ref 4.05–5.2)
SERVICE CMNT-IMP: ABNORMAL
SERVICE CMNT-IMP: ABNORMAL
SODIUM SERPL-SCNC: 135 MMOL/L (ref 133–143)
WBC # BLD AUTO: 7.4 K/UL (ref 4.3–11.1)

## 2024-02-19 PROCEDURE — 80048 BASIC METABOLIC PNL TOTAL CA: CPT

## 2024-02-19 PROCEDURE — 2580000003 HC RX 258: Performed by: PHYSICIAN ASSISTANT

## 2024-02-19 PROCEDURE — 96374 THER/PROPH/DIAG INJ IV PUSH: CPT

## 2024-02-19 PROCEDURE — 96361 HYDRATE IV INFUSION ADD-ON: CPT

## 2024-02-19 PROCEDURE — 99284 EMERGENCY DEPT VISIT MOD MDM: CPT

## 2024-02-19 PROCEDURE — 85025 COMPLETE CBC W/AUTO DIFF WBC: CPT

## 2024-02-19 PROCEDURE — 82962 GLUCOSE BLOOD TEST: CPT

## 2024-02-19 PROCEDURE — 6360000002 HC RX W HCPCS: Performed by: PHYSICIAN ASSISTANT

## 2024-02-19 RX ORDER — BUTALBITAL, ACETAMINOPHEN AND CAFFEINE 300; 40; 50 MG/1; MG/1; MG/1
1 CAPSULE ORAL
Qty: 13 CAPSULE | Refills: 0 | Status: SHIPPED | OUTPATIENT
Start: 2024-02-19 | End: 2024-02-20

## 2024-02-19 RX ORDER — BUTALBITAL, ACETAMINOPHEN AND CAFFEINE 300; 40; 50 MG/1; MG/1; MG/1
1 CAPSULE ORAL
Qty: 2 CAPSULE | Refills: 0 | Status: SHIPPED | OUTPATIENT
Start: 2024-02-19 | End: 2024-02-19

## 2024-02-19 RX ORDER — KETOROLAC TROMETHAMINE 15 MG/ML
15 INJECTION, SOLUTION INTRAMUSCULAR; INTRAVENOUS
Status: COMPLETED | OUTPATIENT
Start: 2024-02-19 | End: 2024-02-19

## 2024-02-19 RX ORDER — BUTALBITAL, ACETAMINOPHEN AND CAFFEINE 300; 40; 50 MG/1; MG/1; MG/1
1 CAPSULE ORAL
Qty: 3 CAPSULE | Refills: 0 | Status: SHIPPED | OUTPATIENT
Start: 2024-02-19 | End: 2024-02-19 | Stop reason: ALTCHOICE

## 2024-02-19 RX ORDER — 0.9 % SODIUM CHLORIDE 0.9 %
1000 INTRAVENOUS SOLUTION INTRAVENOUS
Status: COMPLETED | OUTPATIENT
Start: 2024-02-19 | End: 2024-02-19

## 2024-02-19 RX ORDER — BLOOD-GLUCOSE SENSOR
EACH MISCELLANEOUS
COMMUNITY
Start: 2024-01-30

## 2024-02-19 RX ADMIN — KETOROLAC TROMETHAMINE 15 MG: 15 INJECTION, SOLUTION INTRAMUSCULAR; INTRAVENOUS at 18:47

## 2024-02-19 RX ADMIN — SODIUM CHLORIDE 1000 ML: 9 INJECTION, SOLUTION INTRAVENOUS at 18:46

## 2024-02-19 ASSESSMENT — PAIN SCALES - GENERAL
PAINLEVEL_OUTOF10: 10
PAINLEVEL_OUTOF10: 10

## 2024-02-19 ASSESSMENT — PAIN DESCRIPTION - LOCATION
LOCATION: LEG;BACK
LOCATION: GENERALIZED;HEAD

## 2024-02-19 ASSESSMENT — PAIN - FUNCTIONAL ASSESSMENT
PAIN_FUNCTIONAL_ASSESSMENT: 0-10
PAIN_FUNCTIONAL_ASSESSMENT: 0-10

## 2024-02-19 ASSESSMENT — PAIN DESCRIPTION - ORIENTATION: ORIENTATION: LEFT

## 2024-02-19 NOTE — ED TRIAGE NOTES
Pt ambulatory to room. Pt states she was diagnosed with diabetes on Friday and was started on insulin on Friday. Pt states her blood sugar was over 400 this evening. Pt states she had insulin 45 minutes ago. Pt also reports vertigo for over two weeks. Pt reports left sciatic pain. POC sugar 351 in triage.

## 2024-02-19 NOTE — ED PROVIDER NOTES
Judgment: Judgment normal.          Procedures     Procedures     Orders Placed This Encounter   Procedures    CBC with Auto Differential    BMP    POCT Glucose    POCT Glucose    POCT Glucose        Medications given during this emergency department visit:  Medications   sodium chloride 0.9 % bolus 1,000 mL (0 mLs IntraVENous Stopped 24)   ketorolac (TORADOL) injection 15 mg (15 mg IntraVENous Given 24 184)       Discharge Medication List as of 2024  8:50 PM        START taking these medications    Details   butalbital-APAP-caffeine -40 MG CAPS per capsule Take 1 capsule by mouth every 8-12 hours as needed for Headaches, Disp-2 capsule, R-0Normal              Past Medical History:   Diagnosis Date    Anxiety     anxiety    Arthritis     Bell's palsy     Bipolar disorder (HCC)     Cervical disc disorder     Chronic low back pain     ,neck,hip pain.    Chronic pain     CTS (carpal tunnel syndrome)     Depression     Diverticulitis     GERD (gastroesophageal reflux disease)     Head injury     Headache     Low back pain     Lumbosacral disc disease     Memory disorder     Neck pain     Primary insomnia     PTSD (post-traumatic stress disorder)     Sleep difficulties     Thoracic disc disorder     Vitamin D deficiency         Past Surgical History:   Procedure Laterality Date    APPENDECTOMY      CARDIAC PROCEDURE N/A 2023    Left heart cath / coronary angiography performed by Yuniel Waite MD at [facility] CARDIAC CATH LAB    CARDIAC PROCEDURE N/A 2023    Fractional flow reserve (FFR) performed by Yuniel Waite MD at [facility] CARDIAC CATH LAB    CARPAL TUNNEL RELEASE      CERVICAL DISCECTOMY      CERVICAL FUSION       SECTION   &     Children    CHOLECYSTECTOMY      HYSTERECTOMY (CERVIX STATUS UNKNOWN)      NEUROLOGICAL SURGERY      cervical fusion    ORTHOPEDIC SURGERY      neck x2    TUBAL LIGATION          Results for orders placed or performed

## 2024-02-19 NOTE — ED NOTES
Pt presents to the ED for c/o generalized bodyaches for 2 days.  States that she was dx with diabetes last week and states that she does not feel that her PMD has done enough for her in terms of teaching

## 2024-02-20 NOTE — ED NOTES
Attempted to provide discharge instructions. Prior to this RN's ability to complete discharge instructions, pt interrupts to ask how many fioricet she received with RX. When made aware of 2 pills, pt immediately states \"I need to talk to the doctor. And not that other girl(referring to PA)\".

## 2024-02-20 NOTE — ED NOTES
Family out to nurses station questioning as to if pt should take insulin when getting home. Per PA, pt already took dose of nightly long acting prior to coming to ED and should not take further doses of insulin at home tonight. Family and pt verbalized understanding.

## 2024-02-20 NOTE — ED NOTES
Alternate RX provided by MD. Pt educated on reasoning for receiving paper prescription to provide to pharmacy and cancelling previous prescription escribed to pharmacy. Pt verbalized understanding.

## 2024-02-20 NOTE — ED NOTES
I have reviewed discharge instructions with the patient.  The patient verbalized understanding.    Patient left ED via Discharge Method: ambulatory to Home with daughters.    Opportunity for questions and clarification provided.       Patient given 1 scripts.         To continue your aftercare when you leave the hospital, you may receive an automated call from our care team to check in on how you are doing.  This is a free service and part of our promise to provide the best care and service to meet your aftercare needs.” If you have questions, or wish to unsubscribe from this service please call [phone].  Thank you for Choosing our [facility] Emergency Department.

## 2024-02-20 NOTE — DISCHARGE INSTRUCTIONS
As discussed use your insulin as prescribed by your PCP.  Make sure that you are checking your blood sugars regularly at home.    You can continue taking your regular pain medications.    Keep your appointment with your primary care provider in 2 days.  Return to the ER for any worsening symptoms.

## 2024-02-22 DIAGNOSIS — F39 UNSPECIFIED MOOD (AFFECTIVE) DISORDER (HCC): ICD-10-CM

## 2024-02-26 RX ORDER — ARIPIPRAZOLE 5 MG/1
5 TABLET ORAL DAILY
Qty: 30 TABLET | Refills: 1 | OUTPATIENT
Start: 2024-02-26

## 2024-03-01 DIAGNOSIS — F33.0 MILD EPISODE OF RECURRENT MAJOR DEPRESSIVE DISORDER (HCC): ICD-10-CM

## 2024-03-01 DIAGNOSIS — F41.1 GENERALIZED ANXIETY DISORDER: ICD-10-CM

## 2024-03-01 RX ORDER — DULOXETIN HYDROCHLORIDE 60 MG/1
60 CAPSULE, DELAYED RELEASE ORAL 2 TIMES DAILY
Qty: 60 CAPSULE | Refills: 0 | OUTPATIENT
Start: 2024-03-01

## 2024-03-11 DIAGNOSIS — F33.0 MILD EPISODE OF RECURRENT MAJOR DEPRESSIVE DISORDER (HCC): ICD-10-CM

## 2024-03-11 DIAGNOSIS — F41.1 GENERALIZED ANXIETY DISORDER: ICD-10-CM

## 2024-03-12 RX ORDER — DULOXETIN HYDROCHLORIDE 60 MG/1
60 CAPSULE, DELAYED RELEASE ORAL 2 TIMES DAILY
Qty: 60 CAPSULE | Refills: 0 | OUTPATIENT
Start: 2024-03-12

## 2024-05-14 ENCOUNTER — HOSPITAL ENCOUNTER (EMERGENCY)
Age: 64
Discharge: HOME | End: 2024-05-14
Attending: EMERGENCY MEDICINE
Payer: COMMERCIAL

## 2024-05-14 VITALS
DIASTOLIC BLOOD PRESSURE: 75 MMHG | TEMPERATURE: 99 F | OXYGEN SATURATION: 99 % | RESPIRATION RATE: 18 BRPM | HEIGHT: 65 IN | HEART RATE: 85 BPM | BODY MASS INDEX: 34.16 KG/M2 | WEIGHT: 205 LBS | SYSTOLIC BLOOD PRESSURE: 115 MMHG

## 2024-05-14 DIAGNOSIS — J34.89 SINUS PRESSURE: ICD-10-CM

## 2024-05-14 DIAGNOSIS — R51.9 ACUTE NONINTRACTABLE HEADACHE, UNSPECIFIED HEADACHE TYPE: Primary | ICD-10-CM

## 2024-05-14 DIAGNOSIS — R42 DIZZINESS: ICD-10-CM

## 2024-05-14 LAB
ALBUMIN SERPL-MCNC: 4 G/DL (ref 3.2–4.6)
ALBUMIN/GLOB SERPL: 1.4 (ref 0.4–1.6)
ALP SERPL-CCNC: 140 U/L (ref 45–117)
ALT SERPL-CCNC: 22 U/L (ref 13–61)
ANION GAP SERPL CALC-SCNC: 13 MMOL/L (ref 2–11)
AST SERPL-CCNC: 22 U/L (ref 15–37)
BILIRUB SERPL-MCNC: 0.3 MG/DL (ref 0.2–1.1)
BUN SERPL-MCNC: 14 MG/DL (ref 8–23)
CALCIUM SERPL-MCNC: 9.3 MG/DL (ref 8.3–10.4)
CHLORIDE SERPL-SCNC: 105 MMOL/L (ref 98–107)
CO2 SERPL-SCNC: 25 MMOL/L (ref 21–32)
CREAT SERPL-MCNC: 0.69 MG/DL (ref 0.6–1)
ERYTHROCYTE [DISTWIDTH] IN BLOOD BY AUTOMATED COUNT: 14.3 % (ref 11.9–14.6)
GLOBULIN SER CALC-MCNC: 2.9 G/DL (ref 2.8–4.5)
GLUCOSE SERPL-MCNC: 101 MG/DL (ref 65–100)
HCT VFR BLD AUTO: 34.1 % (ref 35.8–46.3)
HGB BLD-MCNC: 11 G/DL (ref 11.7–15.4)
MCH RBC QN AUTO: 24.8 PG (ref 26.1–32.9)
MCHC RBC AUTO-ENTMCNC: 32.3 G/DL (ref 31.4–35)
MCV RBC AUTO: 77 FL (ref 82–102)
NRBC # BLD: 0 K/UL (ref 0–0.2)
PLATELET # BLD AUTO: 464 K/UL (ref 150–450)
PMV BLD AUTO: 8.1 FL (ref 9.4–12.3)
POTASSIUM SERPL-SCNC: 3.8 MMOL/L (ref 3.5–5.1)
PROT SERPL-MCNC: 6.9 G/DL (ref 6.4–8.2)
RBC # BLD AUTO: 4.43 M/UL (ref 4.05–5.2)
SODIUM SERPL-SCNC: 143 MMOL/L (ref 133–143)
WBC # BLD AUTO: 7.6 K/UL (ref 4.3–11.1)

## 2024-05-14 PROCEDURE — 80053 COMPREHEN METABOLIC PANEL: CPT

## 2024-05-14 PROCEDURE — 85027 COMPLETE CBC AUTOMATED: CPT

## 2024-05-14 PROCEDURE — 6360000002 HC RX W HCPCS: Performed by: EMERGENCY MEDICINE

## 2024-05-14 PROCEDURE — 96375 TX/PRO/DX INJ NEW DRUG ADDON: CPT

## 2024-05-14 PROCEDURE — 2580000003 HC RX 258: Performed by: EMERGENCY MEDICINE

## 2024-05-14 PROCEDURE — 99284 EMERGENCY DEPT VISIT MOD MDM: CPT

## 2024-05-14 PROCEDURE — 96374 THER/PROPH/DIAG INJ IV PUSH: CPT

## 2024-05-14 RX ORDER — BUTALBITAL, ACETAMINOPHEN AND CAFFEINE 300; 40; 50 MG/1; MG/1; MG/1
1 CAPSULE ORAL
Qty: 13 CAPSULE | Refills: 0 | Status: SHIPPED | OUTPATIENT
Start: 2024-05-14 | End: 2024-05-16

## 2024-05-14 RX ORDER — DIPHENHYDRAMINE HYDROCHLORIDE 50 MG/ML
25 INJECTION INTRAMUSCULAR; INTRAVENOUS
Status: COMPLETED | OUTPATIENT
Start: 2024-05-14 | End: 2024-05-14

## 2024-05-14 RX ORDER — PROCHLORPERAZINE EDISYLATE 5 MG/ML
10 INJECTION INTRAMUSCULAR; INTRAVENOUS ONCE
Status: COMPLETED | OUTPATIENT
Start: 2024-05-14 | End: 2024-05-14

## 2024-05-14 RX ORDER — 0.9 % SODIUM CHLORIDE 0.9 %
1000 INTRAVENOUS SOLUTION INTRAVENOUS ONCE
Status: COMPLETED | OUTPATIENT
Start: 2024-05-14 | End: 2024-05-14

## 2024-05-14 RX ADMIN — SODIUM CHLORIDE 1000 ML: 9 INJECTION, SOLUTION INTRAVENOUS at 15:49

## 2024-05-14 RX ADMIN — DIPHENHYDRAMINE HYDROCHLORIDE 25 MG: 50 INJECTION, SOLUTION INTRAMUSCULAR; INTRAVENOUS at 15:49

## 2024-05-14 RX ADMIN — PROCHLORPERAZINE EDISYLATE 10 MG: 5 INJECTION INTRAMUSCULAR; INTRAVENOUS at 15:49

## 2024-05-14 ASSESSMENT — PAIN - FUNCTIONAL ASSESSMENT: PAIN_FUNCTIONAL_ASSESSMENT: 0-10

## 2024-05-14 ASSESSMENT — PAIN SCALES - GENERAL: PAINLEVEL_OUTOF10: 10

## 2024-05-14 ASSESSMENT — PAIN DESCRIPTION - LOCATION: LOCATION: BACK

## 2024-05-14 NOTE — ED PROVIDER NOTES
Emergency Department Provider Note       PCP: None, None   Age: 63 y.o.   Sex: female     DISPOSITION Decision To Discharge 05/14/2024 04:20:00 PM       ICD-10-CM    1. Acute nonintractable headache, unspecified headache type  R51.9       2. Dizziness  R42       3. Sinus pressure  J34.89           Medical Decision Making     Patient was feeling much better on reevaluation.  She has never had any neurologic deficits while in the ED.  She did not report any at home.  She states that since her migraine is feeling better now she is not send felt dizzy anymore.     1 or more acute illnesses that pose a threat to life or bodily function.   Shared medical decision making was utilized in creating the patients health plan today.  Considerations: The following items were considered but not ordered: CT head, CTA, MRI brain.    I independently ordered and reviewed each unique test.  I reviewed external records: provider visit note from PCP.  I reviewed external records: previous lab results from outside ED.  I reviewed external records: previous imaging study including radiologist interpretation.                   History     Patient was trying to get a virtual visit this morning and it was recommended that she come to the ED for evaluation of her dizziness.  She has history of headaches, vertigo, chronic pain, cervical disc disease in the neck, PTSD.   Her symptoms have been going on for the last 4 days.  She reports that her headaches are worse her sinus pressure is worse than usual.  She does have a history of chronic sinusitis and is currently on mucous release Flonase and has not recently had a Z-Dalton.  She also states that whenever she gets a bad migraine she typically feels on the dizziness.  She has had no falls.    The history is provided by the patient.     Physical Exam     Vitals signs and nursing note reviewed:  Vitals:    05/14/24 1446 05/14/24 1454 05/14/24 1500   BP: (!) 153/76  118/74   Pulse: (!) 111

## 2024-05-14 NOTE — ED TRIAGE NOTES
Pt ambulatory to triage for reports of headache, sinus pressure, cough & eye burning since Friday. Pt states she has chronic neck & back pain which has been flaring up. Pt states her doctor sent her here to rule out stroke. Pt states has had strokes in the past. NIH 0 at this time.

## 2024-05-14 NOTE — ED NOTES
Patient mobility status  with no difficulty. Provider aware     I have reviewed discharge instructions with the patient.  The patient verbalized understanding.    Patient left ED via Discharge Method: ambulatory to Home.    Opportunity for questions and clarification provided.     Patient given 1 scripts.

## 2024-05-16 RX ORDER — BUTALBITAL, ACETAMINOPHEN AND CAFFEINE 300; 40; 50 MG/1; MG/1; MG/1
1 CAPSULE ORAL
Qty: 13 CAPSULE | Refills: 0 | Status: SHIPPED | OUTPATIENT
Start: 2024-05-16 | End: 2024-05-20

## 2024-06-25 NOTE — PROGRESS NOTES
Jacquelyn Dye  : 1960 [facility] PRASHANT RAO  [address] [facility], [address] [place] [address],[address], [place] [address].  [phone]   [phone]       OUTPATIENT PHYSICAL THERAPY:Daily Note 2017    ICD-10: Treatment Diagnosis: Difficulty in walking, not elsewhere classified (R26.2); Low back pain (M54.5)  Precautions/Allergies:   Pcn [penicillins] and Aspirin   Fall Risk Score: 2 (? 5 = High Risk)  MD Orders: water therapy with Alf  at [facility]; eval and treat for 8 weeks MEDICAL/REFERRING DIAGNOSIS:  Back Pain   DATE OF ONSET: chronic  REFERRING PHYSICIAN: Luis Carlos [facility]: unknown     INITIAL ASSESSMENT:  Ms Randalyn Hashimoto presents to PT with complaints of pain, decreased strength, and decreased posture. She has complaints of pain from the back of her head throughout her entire spine and all 4 extremities. She has difficulty with mobility and performing her ADL's. She has done aquatics in the past and it has been helpful. She will benefit from skilled physical therapy to help decrease pain and improve mobility. PROBLEM LIST (Impacting functional limitations):  1. Decreased Strength  2. Decreased ADL/Functional Activities   3. Decreased endurance  4. Increased pain INTERVENTIONS PLANNED:  1. aquatic therapy   2. Therapeutic exercise for ROM, strengthening, flexibility  3. Manual therapy  4. Modalities as needed for pain   TREATMENT PLAN:  Effective Dates: 3-7-17 TO 17. Frequency/Duration: 2 times a week for 12 weeks  GOALS: (Goals have been discussed and agreed upon with patient.)  Short-Term Functional Goals: Time Frame: 6 weeks  1. Pt will report compliance with HEP. 2. Pt will be able to demonstrate neutral spine with sitting and standing positions. 3. Pt will perform 5 min walking endurance test.   Discharge Goals: Time Frame: 12 weeks  1. Pt will improve 5 min walking endurance test by 100 ft for improved mobility.   2. Pt will negotiate 1 Please see portal message.    flight of stairs with step over step to get to her bedroom at home. 3. Pt will demonstrate neutral spine in standing for 5 minutes to perform an ADL at home. Rehabilitation Potential For Stated Goals: Fair  Regarding Patricia Limon's therapy, I certify that the treatment plan above will be carried out by a therapist or under their direction. Thank you for this referral,    Adriana Alvarez PTA       Referring Physician Signature: Troy Paulie*              Date                      HISTORY:   History of Present Injury/Illness (Reason for Referral):  Pt was injured in 2007 and continues to have symptoms from accident. Pain 8/10. Pain is constant, pain does go down her legs and arms. Numbness in her right hand. Pt reports her pain \"draws her forward\" and she doesn't like that. She would like to have exercises for improved posture. Past Medical History/Comorbidities:   neck surgery 2000 , 2007  Social History/Living Environment:    Pt lives alone. 2 story house with the bedroom on the 2nd floor. 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 Current Medications:       Current Outpatient Prescriptions:     LORazepam (ATIVAN) 1 mg tablet, Take 1 Tab by mouth nightly as needed for Anxiety. Max Daily Amount: 1 mg., Disp: 15 Tab, Rfl: 0    ZOLPIDEM TARTRATE (AMBIEN PO), Take 10 mg by mouth., Disp: , Rfl:     OXYCODONE HCL/ACETAMINOPHEN (PERCOCET PO), Take 10 mg by mouth., Disp: , Rfl:     citalopram (CELEXA) 20 mg tablet, Take 20 mg by mouth daily. , Disp: , Rfl:     pantoprazole (PROTONIX) 40 mg tablet, Take 40 mg by mouth daily. , Disp: , Rfl:     celecoxib (CELEBREX) 200 mg capsule, Take 200 mg by mouth two (2) times a day.  , Disp: , Rfl:     gabapentin (NEURONTIN) 300 mg capsule, Take 300 mg by mouth three (3) times daily.   , Disp: , Rfl:    Date Last Reviewed:  3-8-17   Number of Personal Factors/Comorbidities that affect the Plan of Care: 1-2: MODERATE COMPLEXITY   EXAMINATION:   Observation/Orthostatic Postural Assessment:          Pt sits with forward trunk and forward head posture. Pt stands with hip in anterior rotation, forward trunk position, forward head, and rounded shoulders. Sitting she leans to the right. Stairs: pt able to go up stairs one step at a time, very slowly. Pt refused to perform 5 min walking endurance test today. Body Structures Involved:  1. Joints  2. Muscles Body Functions Affected:  1. Neuromusculoskeletal Activities and Participation Affected:  1. Mobility  2. Self Care  3. Community, Social and [place]   Number of elements (examined above) that affect the Plan of Care: 3: MODERATE COMPLEXITY   CLINICAL PRESENTATION:   Presentation: Evolving clinical presentation with changing clinical characteristics: MODERATE COMPLEXITY   CLINICAL DECISION MAKING:   Outcome Measure: Tool Used: Modified Oswestry Low Back Pain Questionnaire  Score:  Initial: 35/50  Most Recent: X/50 (Date: -- )   Interpretation of Score: Each section is scored on a 0-5 scale, 5 representing the greatest disability. The scores of each section are added together for a total score of 50. Medical Necessity:   · Patient is expected to demonstrate progress in strength and endurance to improve mobility. Reason for Services/Other Comments:  · Patient continues to require skilled intervention due to decreased mobility and increased pain. Use of outcome tool(s) and clinical judgement create a POC that gives a: Questionable prediction of patient's progress: MODERATE COMPLEXITY            TREATMENT:   (In addition to Assessment/Re-Assessment sessions the following treatments were rendered)  Pre-treatment Symptoms/Complaints:   Pt states she is feeling better from her rash last week. Pain: Initial:     5/10 visually Post Session:  5/10     Aquatic Therapy (45 minutes):  Aquatic treatment performed per flow grid for Decreased muscle strength, Decreased endurance, Decreased range of motion, Decreased activity endurance, Decompression, Ease of movement and Low impact and reduced weight bearing activity. Cues provided for posture, gait and ex. Aquatic Exercise Log       Date  3/15 Date  3/24 Date  5/3 Date  6/27/17 Date  7/11/17   Activity/ Exercise Parameters Parameters Parameters Parameters Parameters   Walking forward 6 6 6 6 6   Walking backward 6 6 6 6 6   Walking sideways 6 6 6 6 6     Marching 6 6 6 6 6     Goose Step 6 6 6 6 6     Tip toes 3 3 3 3 3     Heels 3 3 3 3 3     Lunges    Long step 6 Long step 6   Side step squats        LE Exercises    3.5' 4.5'     Hip Flex/Ext 10 12 12 12 15     Hip Abd/Add 10 12 12 12 15     Hip IR/ER          Calf raises 10 12 12 12 15     Knee Flex 10 12 12 12 15     Squats          Leg Circles 10/10 12/12 12/12 12/12 15/15     Step Ups 10 12 12 12 15   UE Exercises          butterflies 10 12 12       Shoulder rolls 10x 12 12       Pull Down          Bicep/Tricep        Rows/Press outs         Chi Positions          Trunk Rotation        Deep H2O/ Noodles 7' with noodle and assist  7' with noodle and assist Declined 7' with arm rings     Stabilization          Arms only          Legs only        Cross   Country 1 min 2 min 2 min  2 min     Scissors 1 min 1 min 1 min  2 min     Jog   Jog 3 min  Jog 3 min  2 min   Lower abdominal   work           Cardio          Jogging        Lap   Swimming          Stretches          Hamstrings          Heelcords          Piriformis          Neck          Pt sat in front of jet x 15 minutes after PT for pressure relief on back. Treatment/Session Assessment:    · Response to Treatment:  Pt did well with exercises in the water. · Compliance with Program/Exercises: Will assess as treatment progresses.   · Recommendations/Intent for next treatment session: aquatic therapy for posture, mobility, strengthening  Total Treatment Duration: 45 minutes  PT Patient Time In/Time Out  Time In: 1315  Time Out: 1400 [facility]

## 2024-11-19 ENCOUNTER — HOSPITAL ENCOUNTER (EMERGENCY)
Age: 64
Discharge: HOME | End: 2024-11-19
Payer: MEDICARE

## 2024-11-19 ENCOUNTER — APPOINTMENT (OUTPATIENT)
Dept: GENERAL RADIOLOGY | Age: 64
End: 2024-11-19
Payer: MEDICARE

## 2024-11-19 VITALS
RESPIRATION RATE: 16 BRPM | HEIGHT: 66 IN | WEIGHT: 213 LBS | DIASTOLIC BLOOD PRESSURE: 92 MMHG | OXYGEN SATURATION: 94 % | BODY MASS INDEX: 34.23 KG/M2 | TEMPERATURE: 98.8 F | SYSTOLIC BLOOD PRESSURE: 137 MMHG | HEART RATE: 85 BPM

## 2024-11-19 DIAGNOSIS — G89.29 OTHER CHRONIC PAIN: ICD-10-CM

## 2024-11-19 DIAGNOSIS — R51.9 NONINTRACTABLE HEADACHE, UNSPECIFIED CHRONICITY PATTERN, UNSPECIFIED HEADACHE TYPE: ICD-10-CM

## 2024-11-19 DIAGNOSIS — R07.9 CHEST PAIN, UNSPECIFIED TYPE: Primary | ICD-10-CM

## 2024-11-19 LAB
ALBUMIN SERPL-MCNC: 4.2 G/DL (ref 3.2–4.6)
ALBUMIN/GLOB SERPL: 1.3 (ref 1–1.9)
ALP SERPL-CCNC: 124 U/L (ref 35–104)
ALT SERPL-CCNC: 24 U/L (ref 12–65)
ANION GAP SERPL CALC-SCNC: 11 MMOL/L (ref 7–16)
AST SERPL-CCNC: 25 U/L (ref 15–37)
BASOPHILS # BLD: 0.1 K/UL (ref 0–0.2)
BASOPHILS NFR BLD: 1 % (ref 0–2)
BILIRUB SERPL-MCNC: 0.2 MG/DL (ref 0–1.2)
BUN SERPL-MCNC: 12 MG/DL (ref 8–23)
CALCIUM SERPL-MCNC: 9.5 MG/DL (ref 8.8–10.2)
CHLORIDE SERPL-SCNC: 106 MMOL/L (ref 98–107)
CO2 SERPL-SCNC: 25 MMOL/L (ref 20–29)
CREAT SERPL-MCNC: 0.59 MG/DL (ref 0.8–1.3)
DIFFERENTIAL METHOD BLD: ABNORMAL
EKG ATRIAL RATE: 90 BPM
EKG DIAGNOSIS: NORMAL
EKG P AXIS: 48 DEGREES
EKG P-R INTERVAL: 150 MS
EKG Q-T INTERVAL: 360 MS
EKG QRS DURATION: 89 MS
EKG QTC CALCULATION (BAZETT): 438 MS
EKG R AXIS: 15 DEGREES
EKG T AXIS: 19 DEGREES
EKG VENTRICULAR RATE: 89 BPM
EOSINOPHIL # BLD: 0.2 K/UL (ref 0–0.8)
EOSINOPHIL NFR BLD: 2 % (ref 0.5–7.8)
ERYTHROCYTE [DISTWIDTH] IN BLOOD BY AUTOMATED COUNT: 16.7 % (ref 11.9–14.6)
GLOBULIN SER CALC-MCNC: 3.2 G/DL (ref 2.3–3.5)
GLUCOSE SERPL-MCNC: 98 MG/DL (ref 65–100)
HCT VFR BLD AUTO: 35.8 % (ref 35.8–46.3)
HGB BLD-MCNC: 11.5 G/DL (ref 11.7–15.4)
IMM GRANULOCYTES # BLD AUTO: 0 K/UL (ref 0–0.5)
IMM GRANULOCYTES NFR BLD AUTO: 0 % (ref 0–5)
LYMPHOCYTES # BLD: 3.4 K/UL (ref 0.5–4.6)
LYMPHOCYTES NFR BLD: 34 % (ref 13–44)
MCH RBC QN AUTO: 24.7 PG (ref 26.1–32.9)
MCHC RBC AUTO-ENTMCNC: 32.1 G/DL (ref 31.4–35)
MCV RBC AUTO: 76.8 FL (ref 82–102)
MONOCYTES # BLD: 0.7 K/UL (ref 0.1–1.3)
MONOCYTES NFR BLD: 8 % (ref 4–12)
NEUTS SEG # BLD: 5.5 K/UL (ref 1.7–8.2)
NEUTS SEG NFR BLD: 56 % (ref 43–78)
NRBC # BLD: 0 K/UL (ref 0–0.2)
PLATELET # BLD AUTO: 469 K/UL (ref 150–450)
PMV BLD AUTO: 8.5 FL (ref 9.4–12.3)
POTASSIUM SERPL-SCNC: 4 MMOL/L (ref 3.5–5.1)
PROT SERPL-MCNC: 7.4 G/DL (ref 6.3–8.2)
RBC # BLD AUTO: 4.66 M/UL (ref 4.05–5.2)
SODIUM SERPL-SCNC: 142 MMOL/L (ref 133–143)
TROPONIN T SERPL HS-MCNC: 6.2 NG/L (ref 0–14)
TROPONIN T SERPL HS-MCNC: <6 NG/L (ref 0–14)
WBC # BLD AUTO: 9.8 K/UL (ref 4.3–11.1)

## 2024-11-19 PROCEDURE — 80053 COMPREHEN METABOLIC PANEL: CPT

## 2024-11-19 PROCEDURE — 85025 COMPLETE CBC W/AUTO DIFF WBC: CPT

## 2024-11-19 PROCEDURE — 6360000002 HC RX W HCPCS: Performed by: PHYSICIAN ASSISTANT

## 2024-11-19 PROCEDURE — 96375 TX/PRO/DX INJ NEW DRUG ADDON: CPT

## 2024-11-19 PROCEDURE — 84484 ASSAY OF TROPONIN QUANT: CPT

## 2024-11-19 PROCEDURE — 96374 THER/PROPH/DIAG INJ IV PUSH: CPT

## 2024-11-19 PROCEDURE — 71045 X-RAY EXAM CHEST 1 VIEW: CPT

## 2024-11-19 PROCEDURE — 99285 EMERGENCY DEPT VISIT HI MDM: CPT

## 2024-11-19 PROCEDURE — 93005 ELECTROCARDIOGRAM TRACING: CPT | Performed by: EMERGENCY MEDICINE

## 2024-11-19 PROCEDURE — 93010 ELECTROCARDIOGRAM REPORT: CPT | Performed by: INTERNAL MEDICINE

## 2024-11-19 RX ORDER — METOCLOPRAMIDE HYDROCHLORIDE 5 MG/ML
10 INJECTION INTRAMUSCULAR; INTRAVENOUS
Status: COMPLETED | OUTPATIENT
Start: 2024-11-19 | End: 2024-11-19

## 2024-11-19 RX ORDER — DIPHENHYDRAMINE HYDROCHLORIDE 50 MG/ML
25 INJECTION INTRAMUSCULAR; INTRAVENOUS
Status: COMPLETED | OUTPATIENT
Start: 2024-11-19 | End: 2024-11-19

## 2024-11-19 RX ADMIN — DIPHENHYDRAMINE HYDROCHLORIDE 25 MG: 50 INJECTION INTRAMUSCULAR; INTRAVENOUS at 11:27

## 2024-11-19 RX ADMIN — METOCLOPRAMIDE 10 MG: 5 INJECTION, SOLUTION INTRAMUSCULAR; INTRAVENOUS at 11:27

## 2024-11-19 ASSESSMENT — ENCOUNTER SYMPTOMS
GASTROINTESTINAL NEGATIVE: 1
RESPIRATORY NEGATIVE: 1
BACK PAIN: 1

## 2024-11-19 ASSESSMENT — PAIN DESCRIPTION - LOCATION: LOCATION: CHEST

## 2024-11-19 ASSESSMENT — PAIN SCALES - GENERAL: PAINLEVEL_OUTOF10: 10

## 2024-11-19 ASSESSMENT — PAIN - FUNCTIONAL ASSESSMENT: PAIN_FUNCTIONAL_ASSESSMENT: 0-10

## 2024-11-19 NOTE — ED PROVIDER NOTES
2023    Left heart cath / coronary angiography performed by Yuniel Waite MD at [facility] CARDIAC CATH LAB    CARDIAC PROCEDURE N/A 2023    Fractional flow reserve (FFR) performed by Yuniel Waite MD at [facility] CARDIAC CATH LAB    CARPAL TUNNEL RELEASE      CERVICAL DISCECTOMY      CERVICAL FUSION       SECTION   &     Children    CHOLECYSTECTOMY      HYSTERECTOMY (CERVIX STATUS UNKNOWN)      NEUROLOGICAL SURGERY      cervical fusion    ORTHOPEDIC SURGERY      neck x2    TUBAL LIGATION          Social History     Socioeconomic History    Marital status:      Spouse name: None    Number of children: None    Years of education: None    Highest education level: None   Tobacco Use    Smoking status: Never    Smokeless tobacco: Former   Vaping Use    Vaping status: Never Used   Substance and Sexual Activity    Alcohol use: No    Drug use: No    Sexual activity: Not Currently     Partners: Male     Social Determinants of Health     Financial Resource Strain: Low Risk  (2024)    Received from [facility]    Financial Resource Strain     Difficulty Paying Living Expenses: Not hard at all     Difficulty Paying Medical Expenses: No   Food Insecurity: No Food Insecurity (2024)    Received from [facility]    Food Insecurity     Worried about Running Out of Food in the Last Year: Never true     Ran Out of Food in the Last Year: Never true   Transportation Needs: No Transportation Needs (2024)    Received from [facility]    Transportation Needs     Lack of Transportation: No   Physical Activity: Insufficiently Active (2024)    Received from [facility]    Physical Activity     Days of Exercise per Week: 4     Minutes of Exercise per Session: 30     Total Minutes of Exercise per Week: 120   Stress: Stress Concern Present (2024)    Received from [facility]    Stress

## 2024-11-19 NOTE — ED TRIAGE NOTES
Pt via wheelchair to triage for reports of mid chest pain. Pt states pain has been ongoing for days. Pt also reporting dizziness with hx of vertigo. Pt also reporting back, neck, & hip pain which she has chronically. Pt states she saw her doctor last week & was given a shot \"for swelling.\" Pt states she also saw pain management at [facility] last Friday & was given injections into her neck & back. Pt states she has had increased stress due to phone not working & issues with her vehicle.  Pt states EMS came to her house & did a 12 lead around 5am but pt did not want to go in the ambulance & opted to drive herself here.

## 2024-11-19 NOTE — ED NOTES
Patient mobility status  with no difficulty.     I have reviewed discharge instructions with the patient.  The patient verbalized understanding.    Patient left ED via Discharge Method: ambulatory to Home.    Opportunity for questions and clarification provided.     Patient given 0 scripts.

## 2024-11-23 ENCOUNTER — HOSPITAL ENCOUNTER (EMERGENCY)
Age: 64
Discharge: HOME | End: 2024-11-23
Attending: EMERGENCY MEDICINE
Payer: MEDICARE

## 2024-11-23 ENCOUNTER — APPOINTMENT (OUTPATIENT)
Dept: GENERAL RADIOLOGY | Age: 64
End: 2024-11-23
Payer: MEDICARE

## 2024-11-23 VITALS
HEART RATE: 83 BPM | SYSTOLIC BLOOD PRESSURE: 133 MMHG | TEMPERATURE: 97.9 F | HEIGHT: 66 IN | BODY MASS INDEX: 34.23 KG/M2 | DIASTOLIC BLOOD PRESSURE: 75 MMHG | RESPIRATION RATE: 16 BRPM | OXYGEN SATURATION: 95 % | WEIGHT: 213 LBS

## 2024-11-23 DIAGNOSIS — G43.809 OTHER MIGRAINE WITHOUT STATUS MIGRAINOSUS, NOT INTRACTABLE: Primary | ICD-10-CM

## 2024-11-23 LAB
ALBUMIN SERPL-MCNC: 4.2 G/DL (ref 3.2–4.6)
ALBUMIN/GLOB SERPL: 1.3 (ref 1–1.9)
ALP SERPL-CCNC: 117 U/L (ref 35–104)
ALT SERPL-CCNC: 18 U/L (ref 12–65)
ANION GAP SERPL CALC-SCNC: 11 MMOL/L (ref 7–16)
AST SERPL-CCNC: 18 U/L (ref 15–37)
BASOPHILS # BLD: 0.1 K/UL (ref 0–0.2)
BASOPHILS NFR BLD: 1 % (ref 0–2)
BILIRUB SERPL-MCNC: <0.2 MG/DL (ref 0–1.2)
BUN SERPL-MCNC: 11 MG/DL (ref 8–23)
CALCIUM SERPL-MCNC: 9.3 MG/DL (ref 8.8–10.2)
CHLORIDE SERPL-SCNC: 106 MMOL/L (ref 98–107)
CO2 SERPL-SCNC: 25 MMOL/L (ref 20–29)
CREAT SERPL-MCNC: 0.77 MG/DL (ref 0.8–1.3)
DIFFERENTIAL METHOD BLD: ABNORMAL
EOSINOPHIL # BLD: 0.1 K/UL (ref 0–0.8)
EOSINOPHIL NFR BLD: 1 % (ref 0.5–7.8)
ERYTHROCYTE [DISTWIDTH] IN BLOOD BY AUTOMATED COUNT: 16.6 % (ref 11.9–14.6)
GLOBULIN SER CALC-MCNC: 3.3 G/DL (ref 2.3–3.5)
GLUCOSE SERPL-MCNC: 152 MG/DL (ref 65–100)
HCT VFR BLD AUTO: 34.1 % (ref 35.8–46.3)
HGB BLD-MCNC: 10.9 G/DL (ref 11.7–15.4)
IMM GRANULOCYTES # BLD AUTO: 0 K/UL (ref 0–0.5)
IMM GRANULOCYTES NFR BLD AUTO: 0 % (ref 0–5)
LYMPHOCYTES # BLD: 3.4 K/UL (ref 0.5–4.6)
LYMPHOCYTES NFR BLD: 36 % (ref 13–44)
MCH RBC QN AUTO: 24.7 PG (ref 26.1–32.9)
MCHC RBC AUTO-ENTMCNC: 32 G/DL (ref 31.4–35)
MCV RBC AUTO: 77.3 FL (ref 82–102)
MONOCYTES # BLD: 0.8 K/UL (ref 0.1–1.3)
MONOCYTES NFR BLD: 8 % (ref 4–12)
NEUTS SEG # BLD: 5.2 K/UL (ref 1.7–8.2)
NEUTS SEG NFR BLD: 55 % (ref 43–78)
NRBC # BLD: 0 K/UL (ref 0–0.2)
PLATELET # BLD AUTO: 435 K/UL (ref 150–450)
PMV BLD AUTO: 8.5 FL (ref 9.4–12.3)
POTASSIUM SERPL-SCNC: 3.9 MMOL/L (ref 3.5–5.1)
PROT SERPL-MCNC: 7.5 G/DL (ref 6.3–8.2)
RBC # BLD AUTO: 4.41 M/UL (ref 4.05–5.2)
SODIUM SERPL-SCNC: 142 MMOL/L (ref 133–143)
TROPONIN T SERPL HS-MCNC: <6 NG/L (ref 0–14)
WBC # BLD AUTO: 9.5 K/UL (ref 4.3–11.1)

## 2024-11-23 PROCEDURE — 71045 X-RAY EXAM CHEST 1 VIEW: CPT

## 2024-11-23 PROCEDURE — 6370000000 HC RX 637 (ALT 250 FOR IP): Performed by: EMERGENCY MEDICINE

## 2024-11-23 PROCEDURE — 85025 COMPLETE CBC W/AUTO DIFF WBC: CPT

## 2024-11-23 PROCEDURE — 84484 ASSAY OF TROPONIN QUANT: CPT

## 2024-11-23 PROCEDURE — 6360000002 HC RX W HCPCS: Performed by: EMERGENCY MEDICINE

## 2024-11-23 PROCEDURE — 96374 THER/PROPH/DIAG INJ IV PUSH: CPT

## 2024-11-23 PROCEDURE — 80053 COMPREHEN METABOLIC PANEL: CPT

## 2024-11-23 PROCEDURE — 99285 EMERGENCY DEPT VISIT HI MDM: CPT

## 2024-11-23 PROCEDURE — 96375 TX/PRO/DX INJ NEW DRUG ADDON: CPT

## 2024-11-23 RX ORDER — METOCLOPRAMIDE HYDROCHLORIDE 5 MG/ML
10 INJECTION INTRAMUSCULAR; INTRAVENOUS
Status: COMPLETED | OUTPATIENT
Start: 2024-11-23 | End: 2024-11-23

## 2024-11-23 RX ORDER — DIPHENHYDRAMINE HYDROCHLORIDE 50 MG/ML
25 INJECTION INTRAMUSCULAR; INTRAVENOUS
Status: COMPLETED | OUTPATIENT
Start: 2024-11-23 | End: 2024-11-23

## 2024-11-23 RX ORDER — BUTALBITAL, ACETAMINOPHEN AND CAFFEINE 50; 325; 40 MG/1; MG/1; MG/1
1 TABLET ORAL EVERY 6 HOURS PRN
Qty: 20 TABLET | Refills: 0 | Status: SHIPPED | OUTPATIENT
Start: 2024-11-23 | End: 2024-11-28

## 2024-11-23 RX ORDER — BUTALBITAL, ACETAMINOPHEN AND CAFFEINE 50; 325; 40 MG/1; MG/1; MG/1
2 TABLET ORAL
Status: COMPLETED | OUTPATIENT
Start: 2024-11-23 | End: 2024-11-23

## 2024-11-23 RX ADMIN — DIPHENHYDRAMINE HYDROCHLORIDE 25 MG: 50 INJECTION INTRAMUSCULAR; INTRAVENOUS at 01:44

## 2024-11-23 RX ADMIN — METOCLOPRAMIDE 10 MG: 5 INJECTION, SOLUTION INTRAMUSCULAR; INTRAVENOUS at 01:44

## 2024-11-23 RX ADMIN — BUTALBITAL, ACETAMINOPHEN, AND CAFFEINE 2 TABLET: 50; 325; 40 TABLET ORAL at 01:43

## 2024-11-23 ASSESSMENT — PAIN SCALES - GENERAL
PAINLEVEL_OUTOF10: 8
PAINLEVEL_OUTOF10: 3

## 2024-11-23 ASSESSMENT — ENCOUNTER SYMPTOMS
SHORTNESS OF BREATH: 0
COUGH: 0
SORE THROAT: 0
NAUSEA: 0
BACK PAIN: 1
VOMITING: 0
ABDOMINAL PAIN: 0

## 2024-11-23 ASSESSMENT — PAIN - FUNCTIONAL ASSESSMENT: PAIN_FUNCTIONAL_ASSESSMENT: NONE - DENIES PAIN

## 2024-11-23 NOTE — ED TRIAGE NOTES
Patient verified by name and  prior to triage. Pt presents to the ER via w/c.  Pt accompianed by self.  Pt and/or family reports chest and neck pain that have continued since her last visit here.      Pt recently seen for migraine and vertigo.

## 2024-11-23 NOTE — ED PROVIDER NOTES
[facility] Emergency Department Provider Note                   PCP:                Samantha Lowery MD               Age: 64 y.o.      Sex: female     MEDICAL DECISION MAKING  Complexity of Problems Addressed:   1 or more chronic illness with an exacerbation or progression    Data Reviewed and Analyzed:  Category 1:    I have reviewed outside records from an external source for any pertinent PMH, ED visits, primary care visits, specialist visits, labs, EKG, and/or radiologic studies.    Category 2:     ED EKG Interpretation  EKG was interpreted in the absence of a cardiologist.    Rate: Rate: Normal  EKG Interpretation: EKG Interpretation: sinus rhythm, no evidence of arrhythmia  ST Segments: Normal ST segments - NO STEMI    I independently ordered and reviewed the labs.    I have reviewed the Radiologist interpretation of the radiologic studies. My medical decision making regarding the radiologic studies is based on the interpretation report of the board certified Radiologist.          Category 3:     Discussion of management or test interpretation:    MDM  Number of Diagnoses or Management Options  Other migraine without status migrainosus, not intractable  Diagnosis management comments: Patient presented for multiple complaints but her ultimate issue was migraine headache.  She has not been able to get her Fioricet prescription filled.  Patient was given Fioricet and migraine cocktail.  Her migraine headache was controlled.  Patient had no neurologic deficits to suspect stroke or any other acute neurologic process.  Patient also complained of chest pain which appears to be related to stress.  Nursing initiated cardiac workup.  EKG showed a normal sinus rhythm with no acute ischemic ST changes.  Her troponin was normal and ACS was ruled out.  Her chest x-ray showed no acute findings.  Her CBC and CMP were unremarkable.  I reviewed her records and patient had a normal cardiac cath a year ago.  Patient was  given a prescription for Fioricet.  Patient was discharged home with primary care follow-up.    Based on patient's symptoms, exam, and through evaluation, I do not suspect cerebrovascular accident, cerebral aneurysm, subarachnoid hemorrhage, epidural hematoma, subdural hematoma, brain tumor, meningitis, CNS infection, glaucoma, sinus abscess, pseudotumor cerebri, normal pressure hydrocephalus, seizure, cavernous sinus thrombosis, cerebral vasculitis, temporal arteritis, or any other acute, emergent neurologic process.  Based on patient's symptoms, exam, and a thorough evaluation, I do not suspect NSTEMI, unstable angina, pulmonary embolism, pericardial disease, myocarditis, unstable arrhythmia, aortic dissection, pneumothorax, mediastinitis, esophageal perforation, gastric perforation, gastric hemorrhage, splenic rupture, pancreatitis, cholecystitis, cholangitis, AAA or any other acute emergent cardiopulmonary or abdominal process.            Chasity Carpenter is a 64 y.o. female who presents to the Emergency Department with chief complaint of    Chief Complaint   Patient presents with    Chest Pain    Migraine      Patient presents for evaluation of multiple complaints.  She states that she has a history of migraines and has been having a headache for the past month.  She is out of Fioricet and called her doctor but has not gotten a refill from the pharmacy yet.  She also has vertigo and has been feeling dizzy for the past month.  She states that her meclizine does not help with the dizziness.  She states that she has had 2 flat tires and has been under a lot of stress.  This is causing chest tightness but no shortness of breath.  She has chronic neck pain and takes Percocet.  She states that she came to the ED a couple days ago and they gave her a migraine cocktail and she felt better and is requesting the same.    The history is provided by the patient.       All other systems reviewed and are negative.    Review of

## 2025-06-23 ENCOUNTER — OFFICE VISIT (OUTPATIENT)
Dept: NEUROLOGY | Age: 65
End: 2025-06-23

## 2025-06-23 VITALS
SYSTOLIC BLOOD PRESSURE: 102 MMHG | BODY MASS INDEX: 30.67 KG/M2 | OXYGEN SATURATION: 95 % | WEIGHT: 190 LBS | DIASTOLIC BLOOD PRESSURE: 72 MMHG | HEART RATE: 84 BPM

## 2025-06-23 DIAGNOSIS — M54.2 CERVICALGIA: ICD-10-CM

## 2025-06-23 DIAGNOSIS — G44.86 CERVICOGENIC HEADACHE: Primary | ICD-10-CM

## 2025-06-23 DIAGNOSIS — M48.00 CENTRAL STENOSIS OF SPINAL CANAL: ICD-10-CM

## 2025-06-23 RX ORDER — BUTALBITAL, ACETAMINOPHEN AND CAFFEINE 50; 325; 40 MG/1; MG/1; MG/1
1 TABLET ORAL EVERY 6 HOURS PRN
Qty: 20 TABLET | Refills: 0 | Status: CANCELLED | OUTPATIENT
Start: 2025-06-23 | End: 2025-06-28

## 2025-06-23 NOTE — PROGRESS NOTES
[facility] NEUROLOGY FOLLOW-UP NOTE    Patient: Chasity Carpenter  Physician: Silvano Chang MD    CC:   Chief Complaint   Patient presents with    Follow-up    Headache    Neck Pain     PT reports that she has been able to see pain management, she said they have been helpful but she is still having lots of pain. She is having significant amount of HA. She would like to go over MRI results. She has an appt with neurosurgery in August. Reports numbness in arms, legs, and feet is getting worse.        PCP: Samantha Lowery MD  Referring Provider: Cookie Bean AP*    History of Present Illness:     Interval History on 6/23/2025:  History of Present Illness  Chasity Carpenter is a 64 y.o. female who presents for a follow-up visit regarding persistent headache symptoms.    She reports experiencing continuous headaches characterized by frontal pressure and a stabbing sensation.  She also endorses cervicalgia, neck muscle stiffness and radiating occipital pain that resembles bilateral occipital neuralgia.  She is seeing a pain specialist and is planning to proceed with cervical neck surgery.  I reminded her of who Dr. Navarro is and she agrees with the fact that she needs surgery.  Understands that her headaches are most likely cervicogenic.  We reviewed previous MRI brain of an old left basal ganglia lacunar infarct and remote right frontal infarct from 2022, with MRI in December 2024 showing no new strokes.  She reports that she is taking Plavix as stroke prophylactic measure.  Reports that her allergy to aspirin is \"it burns my stomach\".  Explained to her that this is not an allergy and she could use enteric coated aspirin instead.    Complains of some memory dysfunction, explained that this is likely from polypharmacy, lack of sleep and ongoing chronic pain.  There is no evidence of vascular dementia on MRI.      Prior relevant documentation:  Chasity Carpenter is a 63 y.o. female with PMH

## 2025-06-24 ENCOUNTER — TELEPHONE (OUTPATIENT)
Dept: NEUROSURGERY | Age: 65
End: 2025-06-24

## 2025-06-24 NOTE — TELEPHONE ENCOUNTER
Received call from Ms. Jayden who has been a patient here for some time. Was a patient of Dr. Mcdonnell. Seen Dr. Booth last year in which she was to follow up once Mris were completed, never followed up She has seen multiples neurosurgeons since. Patient is very argumentative. He poncho is a patient of Dr. Mathews. He was seen in our office 6/10 in which the treatment plan consists of neurology for seizure. And a 3 month f/u post MRI. States there was an event and wanted to make an appt with Dr. Mathews for poncho and her at the same time. Dr. Mathews doesn't treat cervical spine. A message will be sent to dr booth regarding appt. [place] wouldn't explain the event, Dr. Mathews witnessed phone call.   Explained if there was an emergency to be evaluated by ED.

## 2025-07-22 ENCOUNTER — TELEPHONE (OUTPATIENT)
Dept: NEUROLOGY | Age: 65
End: 2025-07-22

## 2025-07-22 DIAGNOSIS — G43.909 EPISODIC MIGRAINE: Primary | ICD-10-CM

## 2025-07-22 NOTE — TELEPHONE ENCOUNTER
PA for Nurte Approved today by [employer]  Your request has been approved  Effective Date: 1/1/2025  Authorization Expiration Date: 12/31/2025

## 2025-07-22 NOTE — TELEPHONE ENCOUNTER
Pt called to request more Nurtec samples, I informed her that we are out of samples at this time. She is requesting a prescription.

## (undated) DEVICE — CATHETER DIAG AD 5FR L110CM 145DEG COR GRY HYDRPHLC NYL

## (undated) DEVICE — GLIDESHEATH SLENDER STAINLESS STEEL KIT: Brand: GLIDESHEATH SLENDER

## (undated) DEVICE — BAND COMPR L24CM REG CLR PLAS HEMSTAT EXT HK AND LOOP RETEN

## (undated) DEVICE — CATHETER GUID 6FR L100CM GRN PTFE JR4 TRUELUMEN HYBRID

## (undated) DEVICE — RADIFOCUS OPTITORQUE ANGIOGRAPHIC CATHETER: Brand: OPTITORQUE

## (undated) DEVICE — PRESSURE GUIDEWIRE: Brand: COMET™ II

## (undated) DEVICE — GUIDEWIRE 035IN 210CM PTFE COAT FIX COR J TIP 15MM FIRM BODY